# Patient Record
Sex: FEMALE | Race: WHITE | NOT HISPANIC OR LATINO | Employment: UNEMPLOYED | URBAN - METROPOLITAN AREA
[De-identification: names, ages, dates, MRNs, and addresses within clinical notes are randomized per-mention and may not be internally consistent; named-entity substitution may affect disease eponyms.]

---

## 2019-11-13 ENCOUNTER — HISTORICAL (OUTPATIENT)
Dept: ADMINISTRATIVE | Facility: HOSPITAL | Age: 59
End: 2019-11-13

## 2020-06-04 ENCOUNTER — HISTORICAL (OUTPATIENT)
Dept: ADMINISTRATIVE | Facility: HOSPITAL | Age: 60
End: 2020-06-04

## 2021-01-19 ENCOUNTER — HISTORICAL (OUTPATIENT)
Dept: ADMINISTRATIVE | Facility: HOSPITAL | Age: 61
End: 2021-01-19

## 2021-03-22 ENCOUNTER — HISTORICAL (OUTPATIENT)
Dept: ADMINISTRATIVE | Facility: HOSPITAL | Age: 61
End: 2021-03-22

## 2021-03-24 LAB — FINAL CULTURE: NORMAL

## 2021-04-12 ENCOUNTER — HISTORICAL (OUTPATIENT)
Dept: RADIOLOGY | Facility: HOSPITAL | Age: 61
End: 2021-04-12

## 2021-04-13 ENCOUNTER — HISTORICAL (OUTPATIENT)
Dept: RADIOLOGY | Facility: HOSPITAL | Age: 61
End: 2021-04-13

## 2021-06-02 ENCOUNTER — HISTORICAL (OUTPATIENT)
Dept: RADIOLOGY | Facility: HOSPITAL | Age: 61
End: 2021-06-02

## 2021-08-24 ENCOUNTER — HISTORICAL (OUTPATIENT)
Dept: ADMINISTRATIVE | Facility: HOSPITAL | Age: 61
End: 2021-08-24

## 2021-08-24 LAB
CHOLEST SERPL-MCNC: 240 MG/DL
CHOLEST/HDLC SERPL: 5 {RATIO} (ref 0–5)
HDLC SERPL-MCNC: 47 MG/DL (ref 35–60)
LDLC SERPL CALC-MCNC: 159 MG/DL (ref 50–140)
T4 FREE SERPL-MCNC: 0.89 NG/DL (ref 0.7–1.48)
TRIGL SERPL-MCNC: 168 MG/DL (ref 37–140)
TSH SERPL-ACNC: 0.13 UIU/ML (ref 0.35–4.94)
VLDLC SERPL CALC-MCNC: 34 MG/DL

## 2021-09-17 ENCOUNTER — HISTORICAL (OUTPATIENT)
Dept: ADMINISTRATIVE | Facility: HOSPITAL | Age: 61
End: 2021-09-17

## 2021-11-01 ENCOUNTER — HISTORICAL (OUTPATIENT)
Dept: ADMINISTRATIVE | Facility: HOSPITAL | Age: 61
End: 2021-11-01

## 2021-11-01 LAB — SARS-COV-2 AG RESP QL IA.RAPID: NEGATIVE

## 2021-11-03 LAB — CRC RECOMMENDATION EXT: NORMAL

## 2022-02-15 ENCOUNTER — HISTORICAL (OUTPATIENT)
Dept: RADIOLOGY | Facility: HOSPITAL | Age: 62
End: 2022-02-15

## 2022-02-15 ENCOUNTER — HISTORICAL (OUTPATIENT)
Dept: ADMINISTRATIVE | Facility: HOSPITAL | Age: 62
End: 2022-02-15

## 2022-02-18 ENCOUNTER — HISTORICAL (OUTPATIENT)
Dept: ADMINISTRATIVE | Facility: HOSPITAL | Age: 62
End: 2022-02-18

## 2022-02-18 ENCOUNTER — HISTORICAL (OUTPATIENT)
Dept: RADIOLOGY | Facility: HOSPITAL | Age: 62
End: 2022-02-18

## 2022-03-03 ENCOUNTER — HISTORICAL (OUTPATIENT)
Dept: RADIOLOGY | Facility: HOSPITAL | Age: 62
End: 2022-03-03

## 2022-03-04 ENCOUNTER — HISTORICAL (OUTPATIENT)
Dept: ADMINISTRATIVE | Facility: HOSPITAL | Age: 62
End: 2022-03-04

## 2022-03-04 ENCOUNTER — HISTORICAL (OUTPATIENT)
Dept: RADIOLOGY | Facility: HOSPITAL | Age: 62
End: 2022-03-04

## 2022-03-14 ENCOUNTER — HISTORICAL (OUTPATIENT)
Dept: RADIOLOGY | Facility: HOSPITAL | Age: 62
End: 2022-03-14

## 2022-03-14 ENCOUNTER — HISTORICAL (OUTPATIENT)
Dept: ADMINISTRATIVE | Facility: HOSPITAL | Age: 62
End: 2022-03-14

## 2022-03-17 ENCOUNTER — HISTORICAL (OUTPATIENT)
Dept: ADMINISTRATIVE | Facility: HOSPITAL | Age: 62
End: 2022-03-17

## 2022-03-17 ENCOUNTER — HISTORICAL (OUTPATIENT)
Dept: RADIOLOGY | Facility: HOSPITAL | Age: 62
End: 2022-03-17

## 2022-04-07 ENCOUNTER — HISTORICAL (OUTPATIENT)
Dept: ADMINISTRATIVE | Facility: HOSPITAL | Age: 62
End: 2022-04-07

## 2022-04-23 VITALS
WEIGHT: 125.69 LBS | OXYGEN SATURATION: 97 % | DIASTOLIC BLOOD PRESSURE: 88 MMHG | BODY MASS INDEX: 20.94 KG/M2 | SYSTOLIC BLOOD PRESSURE: 150 MMHG | HEIGHT: 65 IN

## 2022-05-01 NOTE — HISTORICAL [facility]
This is a historical note converted from Rosa. Formatting and pictures may have been removed.  Please reference [employer] for original formatting and attached multimedia. Chief Complaint  pt here for 1 yr sx f/u; no complaints, doing well.  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 She has no concerns at this time. She continues to smoke, 1/2 PPD. She has not been sexually active since surgery, and does not desire to be so at this time. She has had no changes in diet, activity, bowel or bladder function?recently.  ?   She denies bleeding, pain, bladder/bowel dysfunction, she denies hot flashes and self-discontinued paroxetine prescribed last year as her symptoms self resolved.  Review of Systems  Positive in bold.  CONSTITUTIONAL: Unintended weight loss, subjective?fever, chills, weakness, fatigue.  SKIN: Rash, itching.  CARDIOVASCULAR: Chest pain  RESPIRATORY: Shortness of breath  GASTROINTESTINAL: Nausea, vomiting, constipation, diarrhea, abdominal pain  GENITOURINARY: Hematuria, dysuria.  NEUROLOGICAL: Headache, dizziness, numbness, change in bowel or bladder control.  MUSCULOSKELETAL: Muscle, back pain, joint pain, stiffness.  Physical Exam  Vitals & Measurements  HR:?115(Peripheral)? RR:?16? BP:?138/81?  HT:?167.00?cm? WT:?52.900?kg? BMI:?18.97?  General: NAD, A/Ox3  Neck: supple, no masses, no thyromegaly  Respiratory: CTAB  Cardiovascular: RRR no murmurs, rubs, or gallops  Abdomen: soft, nondistended,?nontender to palpation, no appreciable hepatosplenomegaly, no masses palpated; no hernias  Incisions:?Well healed midline vertical incision  Extremities: no edema, no calf tenderness  Breast: Symmetric, no lesions or palpable?masses.? No skin puckering, no nipple retraction or discharge.? No?supraclavicular or axillary masses.?  ?   External genitalia: Normal female  anatomy.?No masses/lesions. Normal appearing urethral meatus. Normal appearing external anus, no infragluteal lesions. No lymphadenopathy. Small ingrown hair noted on the left inferior aspect of the labia, which was not noted to be indurated/swollen/infected/nontender to palpation. Narrowed introitus  Speculum?exam: vaginal mucosa normal in appearance. Pink. No masses/lesions.? Cervix absent.? No blood or abnormal discharge.  Bimanual exam: Uterus absent.?Narrow vagina.?No adnexal fullness/tenderness. Urethra and Bladder?nontender.  Assessment/Plan  1.?Well woman exam?Z01.419  -- Colonscopy referral placed to GI  -- Mammogram and DEXA scan order placed  -- Labs: CBC, CMP, HbA1c, TSH, UA  -- Discussed importance of daily vitamin with vitamin D and calcium for bone health given her age, smoking status and BSO status.  Ordered:  MG Dexa Bone Density Study, Routine, 03/22/21 8:07:00 CDT, Menopausal and Post Menopausal Disorder, None, Ambulatory, Rad Type, Order for future visit, Well woman exam, Schedule this test, [facility], 03/22/21 8:07:00 CDT  MG Screening Bilateral, Routine, 03/22/21 7:47:00 CDT, Screening, None, Ambulatory, Rad Type, Order for future visit, Well woman exam, Schedule this test, [facility], Follow Breast Imaging Order Set Protocol, 03/22/21 7:47:00 CDT  Urine Culture [id number], Routine collect, 03/22/21 8:10:00 CDT, Urine, Collected, Nurse collect, [id number], Stop date 03/22/21 8:10:00 CDT, Well woman exam  XR Bone Density Complete, Routine, 03/22/21 11:46:00 CDT, Screening, None, Ambulatory, Rad Type, Order for future visit, Well woman exam, Schedule this test, [facility], 03/22/21 11:46:00 CDT  ?  2.?Postmenopausal state?Z78.0  ?-- Patient denies vaginal dryness/pruritis/dyspareunia or vasomotor symptoms at this time. She states that she is not sexually active and does not desire to be so anymore.  ?  3.?Tobacco user?Z72.0  --  Discussed with her that her smoking can contribute to her decreased bone mass, posing an additional risk factor to her age and BSO status. Patient does not desire quitting at this time.  ?  1 year follow-up with NP  Referrals  Ambulatory Referral, Specialty: Gastroenterology, Reason: Colonoscopy, Start: 21 7:48:00 CDT, Well woman exam  ?  ?  Reviewed the patients medical history, residents findings on physical exam, and the diagnosis with treatment plan. Care provided was reasonable and necessary.  Oma Larry MD   Problem List/Past Medical History  Ongoing  Allergic rhinitis  Asthma  Hypertension  Knowledge deficit  Lumbago  Sciatica  TMJ disease  Tobacco user  Historical  Pregnant  Procedure/Surgical History  Appendectomy (2020)  Cystoscopy (None) (2020)  Excision of Bilateral Fallopian Tubes, Open Approach (2020)  Hysterectomy Total Abdominal (2020)  Inspection of Bladder, Via Natural or Artificial Opening Endoscopic (2020)  Resection of Bilateral Ovaries, Open Approach (2020)  Resection of Uterus, Open Approach (2020)  Facial Cyst (2003)  Rhinoplasty (2003)  Tonsils (2003)   section (10/21/1988)   Medications  atorvastatin 20 mg oral tablet, 20 mg= 1 tab(s), Oral, Daily  cyclobenzaprine 10 mg oral tablet, 10 mg= 1 tab(s), Oral, TID, 3 refills  hydrOXYzine hydrochloride 25 mg oral tablet, 25 mg= 1 tab(s), Oral, At Bedtime, 2 refills  omeprazole 20 mg oral DR capsule, See Instructions  Allergies  Augmentin?(Swelling)  Bactrim?(Shaking, Rapid heart beat, Constipation, Loss of appetite, Dizziness)  contrast media (iodine-based)?(Burning sensation)  Social History  Abuse/Neglect  No, No, Yes, 03/10/2021  Alcohol  Never, 2020  Employment/School  Unemployed, 2020  Exercise  Exercise duration: 0., 2020  Home/Environment  Lives with Spouse., 2020  Nutrition/Health  Regular, 2020  Sexual  Sexually active: No. Sexual  orientation: Straight or heterosexual. Gender Identity Identifies as female. No, 02/14/2020  Substance Use  Never, 02/14/2020  Tobacco  10 or more cigarettes (1/2 pack or more)/day in last 30 days, No, 03/22/2021  Family History  Alcoholism.: Brother and Brother.  Asthma.: Father.  Bipolar: Brother.  Heart failure.: Brother.  Parkinson disease: Father.  Health Maintenance  Health Maintenance  ???Pending?(in the next year)  ??? ??OverDue  ??? ? ? ?Smoking Cessation due??01/01/21??and every 1??year(s)  ??? ? ? ?Asthma Management-Spirometry due??01/28/21??and every 1??year(s)  ??? ? ? ?Hypertension Maintenance-Medication Prescribed due??02/01/21??and every 1??year(s)  ??? ??Due?  ??? ? ? ?Aspirin Therapy for CVD Prevention due??03/22/21??and every 1??year(s)  ??? ? ? ?Asthma Management-Asthma Education due??03/22/21??and every 6??month(s)  ??? ? ? ?Asthma Management-Sharma Peak Flow due??03/22/21??Variable frequency  ??? ? ? ?Asthma Management-Written Action Plan due??03/22/21??and every 6??month(s)  ??? ? ? ?Breast Cancer Screening due??03/22/21??Unknown Frequency  ??? ? ? ?Hypertension Management-Education due??03/22/21??and every 1??year(s)  ??? ? ? ?Lung Cancer Screening due??03/22/21??and every 1??year(s)  ??? ? ? ?Tetanus Vaccine due??03/22/21??and every 10??year(s)  ??? ? ? ?Zoster Vaccine due??03/22/21??Unknown Frequency  ??? ??Due In Future?  ??? ? ? ?Obesity Screening not due until??01/01/22??and every 1??year(s)  ??? ? ? ?Alcohol Misuse Screening not due until??01/02/22??and every 1??year(s)  ??? ? ? ?Colorectal Screening not due until??01/19/22??and every 1??year(s)  ???Satisfied?(in the past 1 year)  ??? ??Satisfied?  ??? ? ? ?ADL Screening on??03/22/21.??Satisfied by Melissa Salomon  ??? ? ? ?Alcohol Misuse Screening on??03/10/21.??Satisfied by Adry Ferrer  ??? ? ? ?Blood Pressure Screening on??03/22/21.??Satisfied by Melissa Salomon  ??? ? ? ?Body Mass Index Check on??03/22/21.??Satisfied  by Melissa Salomon  ??? ? ? ?Colorectal Screening on??21.??Satisfied by Mercedes Tiwari  ??? ? ? ?Depression Screening on??21.??Satisfied by Melissa Salomon  ??? ? ? ?Diabetes Screening on??21.??Satisfied by Osei Swain  ??? ? ? ?Hypertension Management-BMP on??21.??Satisfied by Osei Swain  ??? ? ? ?Influenza Vaccine on??21.??Satisfied by Melissa Salomon  ??? ? ? ?Lipid Screening on??21.??Satisfied by Osei Swain  ??? ? ? ?Obesity Screening on??21.??Satisfied by Melissa Salomon  ??? ??Refused?  ??? ? ? ?Influenza Vaccine on??03/10/21.??Recorded by Adry Ferrer??Reason: Patient Refuses  ?  Lab Results  Labs Last 24 Hours?  ?Chemistry? Hematology/Coagulation?   Sodium Lvl: 142 mmol/L (21 08:51:00) WBC:?12.3 x10(3)/mcL?High (21 08:51:00)   Potassium Lvl: 4.4 mmol/L (21 08:51:00) RBC: 4.57 x10(6)/mcL (21 08:51:00)   Chloride: 103 mmol/L (21 08:51:00) Hgb: 13.5 gm/dL (21 08:51:00)   CO2: 30 mmol/L (21 08:51:00) Hct: 42.4 % (21 08:51:00)   Calcium Lvl: 9.6 mg/dL (21 08:51:00) Platelet: 336 x10(3)/mcL (21 08:51:00)   Glucose Lvl: 94 mg/dL (21 08:51:00) MCV: 92.8 fL (21 08:51:00)   EA.5 mg/dL (21 08:51:00) MCH: 29.5 pg (21 08:51:00)   BUN: 14.6 mg/dL (21 08:51:00) MCHC: 31.8 gm/dL (21 08:51:00)   Creatinine: 0.73 mg/dL (21 08:51:00) RDW: 13.6 % (21 08:51:00)   Est Creat Clearance Ser: 74.07 mL/min (21 10:19:28) MPV: 9.7 fL (21 08:51:00)   eGFR-AA: 104 (21 08:51:00) Abs Neut: 8.63 x10(3)/mcL (21 08:51:00)   eGFR-ESE:?86 mL/min/1.73 m2?Low (21 08:51:00) Neutro Auto: 70 % (21 08:51:00)   Bili Total: 0.5 mg/dL (21 08:51:00) Lymph Auto: 22 % (21 08:51:00)   Bili Direct: 0.2 mg/dL (21 08:51:00) Mono Auto: 6 % (21 08:51:00)   Bili Indirect: 0.3 mg/dL (21 08:51:00) Eos Auto: 2 %  (03/22/21 08:51:00)   AST: 15 unit/L (03/22/21 08:51:00) Abs Eos: 0.2 x10(3)/mcL (03/22/21 08:51:00)   ALT: 12 unit/L (03/22/21 08:51:00) Basophil Auto: 0 % (03/22/21 08:51:00)   Alk Phos: 82 unit/L (03/22/21 08:51:00) Abs Neutro: 8.63 x10(3)/mcL (03/22/21 08:51:00)   Total Protein:?7.7 gm/dL?High (03/22/21 08:51:00) Abs Lymph: 2.7 x10(3)/mcL (03/22/21 08:51:00)   Albumin Lvl: 4.1 gm/dL (03/22/21 08:51:00) Abs Mono: 0.7 x10(3)/mcL (03/22/21 08:51:00)   Globulin:?3.6 gm/dL?High (03/22/21 08:51:00) Abs Baso: 0.1 x10(3)/mcL (03/22/21 08:51:00)   A/G Ratio: 1.1 ratio (03/22/21 08:51:00) IG%: 0 % (03/22/21 08:51:00)   Hgb A1c: 5.2 % (03/22/21 08:51:00) IG#: 0.05 (03/22/21 08:51:00)   TSH:?0.3337 uIU/mL?Low (03/22/21 08:51:00)

## 2022-06-15 ENCOUNTER — HOSPITAL ENCOUNTER (OUTPATIENT)
Dept: PULMONOLOGY | Facility: HOSPITAL | Age: 62
Discharge: HOME | End: 2022-06-15
Attending: INTERNAL MEDICINE
Payer: MEDICAID

## 2022-06-15 ENCOUNTER — HOSPITAL ENCOUNTER (OUTPATIENT)
Dept: RADIOLOGY | Facility: HOSPITAL | Age: 62
Discharge: HOME | End: 2022-06-15
Attending: INTERNAL MEDICINE
Payer: MEDICAID

## 2022-06-15 DIAGNOSIS — R91.1 LUNG NODULE: ICD-10-CM

## 2022-06-15 PROCEDURE — 71250 CT THORAX DX C-: CPT | Mod: TC

## 2022-06-15 NOTE — PROGRESS NOTES
This patient was not able to perform the Pulmonary Functions Test due to excessive coughing during the exercises. Testing discontinued without results.

## 2022-06-28 ENCOUNTER — OFFICE VISIT (OUTPATIENT)
Dept: FAMILY MEDICINE | Facility: CLINIC | Age: 62
End: 2022-06-28
Payer: MEDICAID

## 2022-06-28 VITALS
HEIGHT: 65 IN | SYSTOLIC BLOOD PRESSURE: 173 MMHG | RESPIRATION RATE: 18 BRPM | OXYGEN SATURATION: 96 % | WEIGHT: 121.06 LBS | BODY MASS INDEX: 20.17 KG/M2 | HEART RATE: 97 BPM | DIASTOLIC BLOOD PRESSURE: 77 MMHG | TEMPERATURE: 98 F

## 2022-06-28 DIAGNOSIS — R03.0 ELEVATED BLOOD PRESSURE READING: ICD-10-CM

## 2022-06-28 DIAGNOSIS — Z71.6 ENCOUNTER FOR SMOKING CESSATION COUNSELING: ICD-10-CM

## 2022-06-28 DIAGNOSIS — F32.A DEPRESSION, UNSPECIFIED DEPRESSION TYPE: ICD-10-CM

## 2022-06-28 DIAGNOSIS — G89.29 OTHER CHRONIC PAIN: ICD-10-CM

## 2022-06-28 DIAGNOSIS — K62.89 RECTAL MASS: ICD-10-CM

## 2022-06-28 DIAGNOSIS — K44.9 HIATAL HERNIA: ICD-10-CM

## 2022-06-28 DIAGNOSIS — R74.01 TRANSAMINITIS: ICD-10-CM

## 2022-06-28 DIAGNOSIS — M51.36 LUMBAR DEGENERATIVE DISC DISEASE: ICD-10-CM

## 2022-06-28 DIAGNOSIS — M47.9 SPONDYLOSIS: ICD-10-CM

## 2022-06-28 DIAGNOSIS — K21.9 GASTROESOPHAGEAL REFLUX DISEASE, UNSPECIFIED WHETHER ESOPHAGITIS PRESENT: ICD-10-CM

## 2022-06-28 DIAGNOSIS — M54.9 BACK PAIN, UNSPECIFIED BACK LOCATION, UNSPECIFIED BACK PAIN LATERALITY, UNSPECIFIED CHRONICITY: Primary | ICD-10-CM

## 2022-06-28 PROBLEM — M51.369 LUMBAR DEGENERATIVE DISC DISEASE: Status: ACTIVE | Noted: 2022-06-28

## 2022-06-28 LAB
AMPHET UR QL SCN: NEGATIVE
BARBITURATE SCN PRESENT UR: NEGATIVE
BENZODIAZ UR QL SCN: NEGATIVE
CANNABINOIDS UR QL SCN: NEGATIVE
COCAINE UR QL SCN: NEGATIVE
FENTANYL UR QL SCN: NEGATIVE
MDMA UR QL SCN: NEGATIVE
OPIATES UR QL SCN: NEGATIVE
PCP UR QL: NEGATIVE
PH UR: 7 [PH] (ref 3–11)
SPECIFIC GRAVITY, URINE AUTO (.000) (OHS): 1.01 (ref 1–1.03)

## 2022-06-28 PROCEDURE — 99213 OFFICE O/P EST LOW 20 MIN: CPT | Mod: PBBFAC

## 2022-06-28 PROCEDURE — 80307 DRUG TEST PRSMV CHEM ANLYZR: CPT

## 2022-06-28 RX ORDER — OMEPRAZOLE 20 MG/1
20 CAPSULE, DELAYED RELEASE ORAL DAILY
Qty: 30 CAPSULE | Refills: 11 | Status: SHIPPED | OUTPATIENT
Start: 2022-06-28 | End: 2023-03-07 | Stop reason: SDUPTHER

## 2022-06-28 RX ORDER — CYCLOBENZAPRINE HCL 10 MG
10 TABLET ORAL 3 TIMES DAILY PRN
Qty: 90 TABLET | Refills: 2 | Status: SHIPPED | OUTPATIENT
Start: 2022-06-28 | End: 2022-07-29 | Stop reason: SDUPTHER

## 2022-06-28 RX ORDER — OXYCODONE AND ACETAMINOPHEN 7.5; 325 MG/1; MG/1
1 TABLET ORAL
Qty: 30 TABLET | Refills: 0 | Status: ON HOLD | OUTPATIENT
Start: 2022-06-28 | End: 2022-09-16 | Stop reason: HOSPADM

## 2022-06-28 NOTE — PROGRESS NOTES
Family Medicine Clinic Note:    PCP: Primary Doctor Koki HARTLEY Haylie is a 62 y.o. female presents to clinic today for  Hiatal Hernia pain       Elevated BP  Pt has no history of HTN, states has been in pain  Initial bp 173/77, Repeat 142/73 manual     Rectal Mass  Saw surgery and refusing surgical intervention for rectal mass at this time  +sweating reported that she attributes to the hiatal hernia  LUQ Abdominal pain that she also attributes to the hernia  +abd swelling  +using ice pack  Not taking the pain medication (oxycodone 7.5 mg/ acetaminophen 325mg daily and needs refills  No bloody stools, No blood in the urine  Will consider surgery if hiatal hernia is repaired      Transaminitis  Resolved on 3/22/22    Back pain 2/ lumbar degenerative disc disease L5-S1 and spondylosis MRI 22)  Requesting flexeril refills  Not taking the Lyrica nor the hydoxyzine   Requesting pain medication       GERD  Requesting the refills Omeprzole 20 mg calli    Depression  Mother  2 months ago   +decreased appetite, +crying episodes daily, difficulty concentrating and forgetfulness ex. loosing keys, +STM loss,   Denies feelings of guilt, SI/HI, loss of interest, psychomotor activities  Has been using the chapelin and friends to cope  Not interested in taking any medications or counselor at this time      Smoking cessation  Started the patch but stopped  States she will restart      Hiatal Hernia  +painful rectal exam and pt walked out of visit with surgeon  Wants to have the hiatal hernia removal   Pt to call Dr. Rolle in surgery clinic     HM:  Pulmonary Nodule  -RUL nodule no change with CT scan 21 from 2020    Subjective:       ROS  Constitutional: No fevers, chills or fatigue  Respiratory: no trouble breathing or SOB  Cardiovascular: no chest pain or tightness, no SOB on activity, no ankle swelling  Gastrointestinal: see HPI    Current Outpatient Medications   Medication Sig Dispense Refill     "cyclobenzaprine (FLEXERIL) 10 MG tablet Take 1 tablet (10 mg total) by mouth 3 (three) times daily as needed for Muscle spasms. 90 tablet 2    omeprazole (PRILOSEC) 20 MG capsule Take 1 capsule (20 mg total) by mouth once daily. 30 capsule 11    oxyCODONE-acetaminophen (PERCOCET) 7.5-325 mg per tablet Take 1 tablet by mouth every 24 hours as needed for Pain. 30 tablet 0     No current facility-administered medications for this visit.       Objective:     BP (!) 173/77 (BP Location: Right arm, Patient Position: Sitting, BP Method: Medium (Automatic))   Pulse 97   Temp 98.1 °F (36.7 °C) (Oral)   Resp 18   Ht 5' 5" (1.651 m)   Wt 54.9 kg (121 lb 0.5 oz)   SpO2 96%   BMI 20.14 kg/m²   BP Readings from Last 3 Encounters:   06/28/22 (!) 173/77   12/27/21 (!) 150/88     Wt Readings from Last 3 Encounters:   06/28/22 54.9 kg (121 lb 0.5 oz)   12/27/21 57 kg (125 lb 10.6 oz)     Recent Results (from the past 200 hour(s))   Drug Screen, Urine    Collection Time: 06/28/22  4:03 PM   Result Value Ref Range    Amphetamines, Urine Negative Negative    Barbituates, Urine Negative Negative    Benzodiazepine, Urine Negative Negative    Cannabinoids, Urine Negative Negative    Cocaine, Urine Negative Negative    Fentanyl, Urine Negative Negative    MDMA, Urine Negative Negative    Opiates, Urine Negative Negative    Phencyclidine, Urine Negative Negative    pH, Urine 7.0 3.0 - 11.0    Specific Gravity, Urine Auto 1.015 1.001 - 1.035     Body mass index is 20.14 kg/m².    Physical Exam  General: cooperative, no acute respiratory distress  CV: RRR, no murmurs  Lungs: CTA b/l, no wheezing?  Abdomen: soft, NT, ND, + BS x 4, no organomegaly, no CVA tenderness  Extremities: no cyanosis, clubbing, or edema  PSYCH: alert and oriented x 3 with sad mood and affect  NEURO: sensation intact by light touch; DTRs +2 bilateral and symmetrical        The 10-year ASCVD risk score (Toddomid IBANEZ Jr., et al., 2013) is: 19.2%    Values used to " calculate the score:      Age: 62 years      Sex: Female      Is Non- : No      Diabetic: No      Tobacco smoker: Yes      Systolic Blood Pressure: 173 mmHg      Is BP treated: No      HDL Cholesterol: 47 mg/dL      Total Cholesterol: 240 mg/dL      Assessment:   Lore Stallings is a 62 y.o. female with:    1. Back pain, unspecified back location, unspecified back pain laterality, unspecified chronicity    2. Gastroesophageal reflux disease, unspecified whether esophagitis present    3. Other chronic pain    4. Rectal mass    5. Transaminitis    6. Spondylosis    7. Lumbar degenerative disc disease    8. Depression, unspecified depression type    9. Encounter for smoking cessation counseling    10. Hiatal hernia    11. Elevated blood pressure reading        Plan:     Elevated BP  Pt has no history of HTN, states has been in pain  Initial bp 173/77, Repeat 142/73 manual   Suspect due to pain, will treat pain and continue to monitor    Rectal Mass  Instructed on surgery to remove the rectal mass   +sweating and discussed likely related to mass and not the Hiatal hernia  Discussed risks of mets   Instructed to set up appointment with surgery for hiatal hernia repair  Pt to consider surgical intervention of rectal mass after repair of the hernia     Transaminitis  Resolved on 3/22/22    Back pain 2/2 lumbar degenerative disc disease L5-S1 and spondylosis MRI 2/2/22)  Refilled flexeril   Not taking the Lyrica nor the hydoxyzine   Pain contract signed today   Ordered oxycodone 7.5 mg/ acetaminophen 325 mg daily prn pain      GERD  Ordered Omeprzole 20 mg calli    Depression  Denies feelings of guilt, SI/HI,  Has been using the chapelin and friends to cope  Not interested in taking any medications or counselor at this time  Instructed to notify this provider if symptoms worsen or if she changes her mind    Smoking cessation  Encouraged restarting patches      Hiatal Hernia  Pt to call Dr. Rolle in  surgery clinic to set up appointment to discuss possible hernia repair          Previous labs, imaging, and notes from other specialities as specified above were all reviewed at this visit with the patient.     Follow up in about 4 weeks (around 7/26/2022) for hiatal hernia pain and pain contract.    Future Appointments   Date Time Provider Department Center   7/29/2022  8:40 AM RESIDENT 13, [facility] FAMILY MEDICINE [facility] FM RES [place]    10/10/2022  2:20 PM PERFECTO Gonzalez MD [facility] GBR [facility] Naqvi   3/27/2023  1:10 PM Tatiana Herron, ABDI [facility] GYN [facility]       Staff: Dr. Luis Swartz MD  Family Medicine PGY-2

## 2022-06-30 ENCOUNTER — TELEPHONE (OUTPATIENT)
Dept: FAMILY MEDICINE | Facility: CLINIC | Age: 62
End: 2022-06-30
Payer: MEDICAID

## 2022-07-01 NOTE — TELEPHONE ENCOUNTER
Please schedule a nurse visit next week to recheck her BP.  Please call and inform pt.  Thank you.

## 2022-07-06 ENCOUNTER — OFFICE VISIT (OUTPATIENT)
Dept: FAMILY MEDICINE | Facility: CLINIC | Age: 62
End: 2022-07-06
Payer: MEDICAID

## 2022-07-06 VITALS
BODY MASS INDEX: 20.18 KG/M2 | OXYGEN SATURATION: 99 % | RESPIRATION RATE: 18 BRPM | WEIGHT: 121.25 LBS | HEART RATE: 78 BPM | SYSTOLIC BLOOD PRESSURE: 144 MMHG | DIASTOLIC BLOOD PRESSURE: 78 MMHG

## 2022-07-06 DIAGNOSIS — R03.0 ELEVATED BLOOD PRESSURE READING: Primary | ICD-10-CM

## 2022-07-06 PROCEDURE — 99213 OFFICE O/P EST LOW 20 MIN: CPT | Mod: PBBFAC

## 2022-07-06 NOTE — PROGRESS NOTES
Subjective:       Patient ID: Lore Stallings is a 62 y.o. female.    Chief Complaint: Follow-up (Bp check)    HPI   Concerns for elevated BP.     Last saw PCP on 6/28/22, note reviewed. At that visit, she was noted to have elevated BP. Initial bp 173/77, Repeat 142/73 manual. There is no prior hx of HTN. Her elevated BP was felt to be secondary to pain from her hiatal hernia. She was instructed to f/u with surgery for  Hernia repair, was given pain medication, and discharged in stable condition.     Today she reports feeling okay. Denies any headache, vision changes, weakness, facial asymmetry.     Current medications: Flexeril, Prilosec, Percocet PRN  Chart review shows no prior renal or cardiac disease. No prior renal labwork done. No hx of stroke or TIA.     Review of Systems   All other systems reviewed and are negative.        Objective:       Vitals:    07/06/22 1233   BP: (!) 144/78.    Repeat /75   Pulse: 78   Resp: 18       Physical Exam  Constitutional:       Appearance: Normal appearance.   Cardiovascular:      Rate and Rhythm: Normal rate and regular rhythm.      Pulses: Normal pulses.      Heart sounds: Normal heart sounds.   Pulmonary:      Effort: Pulmonary effort is normal.      Breath sounds: Normal breath sounds.   Neurological:      General: No focal deficit present.      Mental Status: She is alert and oriented to person, place, and time.   Psychiatric:         Mood and Affect: Mood normal.         Behavior: Behavior normal.           ASCVD Risk: 10.2% today.     Assessment:       Problem List Items Addressed This Visit        Cardiac/Vascular    Elevated blood pressure reading - Primary          Plan:       Elevated BP reading in office  No  Prior history of HTN. Patient is a current tobacco smoker, current ASCVD risk of 10.2%. Asymptomatic today, with no signs of TIA or stroke. AAFP and JNC guidelines for patient is to have a goal SBP of <150 and DBP <90. At this time, the patient is  within those guidelines.   - Discussed TIA and stroke warning signs.   - Discussed importance of BP control  - F/u with PCP. Will NOT start any medications at this time.

## 2022-07-07 NOTE — PROGRESS NOTES
Faculty Attestation: Lore Stallings  was seen in Family Medicine Clinic. Discussed with Dr. Baker at the time of the visit. History of Present Illness, Physical Exam, and Assessment and Plan reviewed. Treatment plan is reasonable and appropriate. Compliance with treatment recommendations is important.       Anabell Mtz MD  Family/Sports Medicine

## 2022-07-12 ENCOUNTER — OFFICE VISIT (OUTPATIENT)
Dept: SURGERY | Facility: CLINIC | Age: 62
End: 2022-07-12
Payer: MEDICAID

## 2022-07-12 VITALS
SYSTOLIC BLOOD PRESSURE: 140 MMHG | RESPIRATION RATE: 18 BRPM | TEMPERATURE: 98 F | HEART RATE: 98 BPM | DIASTOLIC BLOOD PRESSURE: 72 MMHG | WEIGHT: 121.63 LBS | BODY MASS INDEX: 20.26 KG/M2 | HEIGHT: 65 IN | OXYGEN SATURATION: 96 %

## 2022-07-12 DIAGNOSIS — K44.9 HIATAL HERNIA: Primary | ICD-10-CM

## 2022-07-12 PROCEDURE — 99214 OFFICE O/P EST MOD 30 MIN: CPT | Mod: PBBFAC

## 2022-07-12 RX ORDER — ALBUTEROL SULFATE 90 UG/1
2 AEROSOL, METERED RESPIRATORY (INHALATION) EVERY 4 HOURS PRN
COMMUNITY
Start: 2022-06-22 | End: 2022-09-06 | Stop reason: SDUPTHER

## 2022-07-12 RX ORDER — ALENDRONATE SODIUM 10 MG/1
10 TABLET ORAL DAILY
COMMUNITY
Start: 2022-02-24 | End: 2023-03-07 | Stop reason: SDUPTHER

## 2022-07-12 NOTE — PROGRESS NOTES
"Chief complaint:  had concerns including  3 month follow up for rectal mass and hiatal hernia.     HPI:   Lore Stallings is a 62 y.o. female with PMHx significant for asthma, laryngitis, sciatica. Reports 25 year h/o upper abdominal pain associated with eating, breathing, and pain medication. Of note, pt had a colonoscopy which revealed a rectal mass with high grade dysplasia; MRI demonstrated rectal wall thickening. Was seen by her PCP who worked-up hiatal hernia. Referred to [facility] for hiatal hernia repair.    ROS:  Reports unable to walk up a flight of stairs without severe SOB      PMHx:  has a past medical history of Asthma, GERD (gastroesophageal reflux disease), Hypertension, Lumbago, and Sciatica.  PSHx:  has a past surgical history that includes Appendectomy;  section; Tonsillectomy; Hysterectomy; Colonoscopy; Rhinoplasty; Polypectomy; and Esophagogastrectomy.  FamHx: family history includes Arthritis in her brother; Asthma in her father; Heart disease in her brother.  SocHx:  reports that she has quit smoking. Her smoking use included cigarettes. She has never used smokeless tobacco. She reports previous alcohol use. She reports that she does not use drugs.   ALL: Contrast (burning), amoxicillin (edema)    Physical Exam:  Vitals: Blood pressure (!) 140/72, pulse 98, temperature 97.5 °F (36.4 °C), temperature source Oral, resp. rate 18, height 5' 5" (1.651 m), weight 55.2 kg (121 lb 9.6 oz), SpO2 96 %.  General: awake, alert, cooperative, in no acute distress. Pt oriented x3.  Mood/affect normal.    Lungs: CTAB. No retractions or nasal flaring  Abd: soft, non-distended, midline upper TTP  Extremities:  MAEW, good muscle tone and strength  Skin: no rash or edema noted  Neuro: AAO x 3, follows commands, normal gait    No results found for: WBC, RBC, HGB, HCT, MCV, MCH, MCHC, RDW, MPV, PLT  No results found for: NA, K, CL, CO2, GLU, BUN, LABCREA, CALCIUM, MG, PROT, ALBUMIN, BILITOT, AST, " ALT, GFRAA  Triglyceride   Date Value Ref Range Status   08/24/2021 168 (H) 37 - 140 mg/dL Final     Cholesterol Total   Date Value Ref Range Status   08/24/2021 240 (H) <<=200 mg/dL Final     HDL Cholesterol   Date Value Ref Range Status   08/24/2021 47 35 - 60 mg/dL Final     No components found for: UA  No results found for: HGBA1C     Imaging:     CT Chest wo (4/20/22): UPPER ABDOMEN: There is pneumobilia.  Small sliding hiatal hernia.    MRI (3/17/22): There is prominent mid to upper rectal wall thickening which is near  circumferential with perhaps some sparing around 12:00. The most  distal aspect of the wall thickening is approximately 7-8 cm from the  anal verge. Affected segment is approximately 5-6 cm in length. This  extends to the muscularis propria but does not clearly invade it. No  enlarged perirectal lymph nodes are seen. Colonic diverticulosis is  seen more superiorly.      Assessment and Plan:  1)  Hiatal Hernia: Dr. Naqvi to place referral and contact office ([phone]) for hiatal hernia repair.     2)   Rectal mass: Explained to pt severe concern for rectal cancer. Pt firmly not interested in any treatment measures to address the rectal mass at this time. She states she wants to address the hiatal hernia first.    3)  Smoking cessation: pt not taking any medications for smoking cessation. Reports no cravings at this time. Explained to pt if she has cravings, she can follow up for education on smoking cessation assistance.        Dario Diehl  MS3  And  Steve Naqvi MD PGY 5  U General Surgery   [phone]

## 2022-07-12 NOTE — PATIENT INSTRUCTIONS
Pt seen by Dr. Naqvi. Pt will be referred to [facility] for hiatal hernia repair. Referral will be sent in by provider along with phone call to office to set up referral. Pt education given both written and verbal

## 2022-07-12 NOTE — PROGRESS NOTES
I have reviewed the note and assessment with the Resident.  Pt is counseled about need for therapy for rectal mass and  possible malignancy. Pt is adamantly against any pursuit of  surgery for rectal problem. She is informed that it is not thought   that anyone will fix HH at this time w/o further tx for rectal  problem.

## 2022-07-14 DIAGNOSIS — K44.9 HIATAL HERNIA: Primary | ICD-10-CM

## 2022-07-28 NOTE — PROGRESS NOTES
Family Medicine Clinic Note:    PCP: Kyra Reed MD    Lroe Stallings is a 62 y.o. female presents to clinic today for cc: f/up of blood pressure and hiatal hernia      Subjective:     Elevated BP   -BP today 166/83  -Last bp check in the clinic 22 143/75 (prior visit 173/77 and manual 142/73--suspected due to pain)  -Denies any chest pain, sob, palpitations or dizziness       Hiatal Hernia   Rectal Mass  -Hiatal Hernia was referred to [facility] for hiatal hernia repair. Scheduling pending.  -Referred for surgery with Dr. Steve Naqvi/Dr. Jose Dominique   -Continues to decline surgical intervention for rectal mass at this time (Risks and benefits revisited again)  -oxycodone 7.5 mg/ acetaminophen 325 mg at night only --no refills requested  -Oxycodone causing nausea when taken. Stated the 4 mg did not help and has increased to 8 mg   -We have previously attempted to increase the omeprazole to 40 mg and pt did not tolerate well       Back pain 2/2 lumbar degenerative disc disease L5-S1 and spondylosis MRI 22)  Sciatica  Both legs  And buttocks with sharp shooting pain   On flexeril 10 mg and tolerating. No side effects reported  No saddle anesthesia, peripheral paresthesia, abnormal gait, night sweats, night pain or new symptoms      Depression  mother  in April.   States feeling better. Decreased crying episodes.  Hardest when she meets friends of her mother  Denies feelings of guilt, SI/HI, loss of interest, psychomotor activities   No longer using the chapelin but utilizes  friends to cope.  Declines medications at this time  No SI/HI    Smoking cessation  Had stopped the patch but has resumed  Not Interested in the smoking cessation program at this time      HM:  Lung Nodules: Low density CT chest 6/15/2022  and No detrimental interval change from prior exam.  Right upper lobe pulmonary nodule is unchanged.  On alendronate weekly  Will hold on the HM until  "after surgeical procedures    ROS      Constitutional: No fevers, chills or fatigue  Respiratory: no trouble breathing or SOB  Cardiovascular: no chest pain or tightness, no SOB on activity, no ankle swelling  Gastrointestinal: no constipation, no diarrhea, no vomiting      Current Outpatient Medications   Medication Sig Dispense Refill    alendronate (FOSAMAX) 10 MG Tab Take 10 mg by mouth once daily.      omeprazole (PRILOSEC) 20 MG capsule Take 1 capsule (20 mg total) by mouth once daily. 30 capsule 11    oxyCODONE-acetaminophen (PERCOCET) 7.5-325 mg per tablet Take 1 tablet by mouth every 24 hours as needed for Pain. 30 tablet 0    PROAIR HFA 90 mcg/actuation inhaler Inhale 2 puffs into the lungs every 4 (four) hours as needed.      cyclobenzaprine (FLEXERIL) 10 MG tablet Take 1 tablet (10 mg total) by mouth 3 (three) times daily as needed for Muscle spasms. 90 tablet 2    hydroCHLOROthiazide (HYDRODIURIL) 12.5 MG Tab Take 1 tablet (12.5 mg total) by mouth once daily. 30 tablet 2    ondansetron (ZOFRAN-ODT) 4 MG TbDL Take 2 tablets (8 mg total) by mouth nightly as needed (nausea). 30 tablet 2    pregabalin (LYRICA) 75 MG capsule Take 1 capsule (75 mg total) by mouth nightly as needed (sciatica). 30 capsule 2     No current facility-administered medications for this visit.       Objective:     BP (!) 166/83 (BP Location: Right arm, Patient Position: Sitting, BP Method: Small (Automatic))   Pulse 100   Temp 98.2 °F (36.8 °C) (Oral)   Resp 19   Ht 5' 5" (1.651 m)   Wt 55.1 kg (121 lb 6.4 oz)   SpO2 95%   BMI 20.20 kg/m²   BP Readings from Last 3 Encounters:   07/29/22 (!) 166/83   07/12/22 (!) 140/72   07/06/22 (!) 144/78     Wt Readings from Last 3 Encounters:   07/29/22 55.1 kg (121 lb 6.4 oz)   07/12/22 55.2 kg (121 lb 9.6 oz)   07/06/22 55 kg (121 lb 4.1 oz)     No results found for this or any previous visit (from the past 200 hour(s)).  Body mass index is 20.2 kg/m².         (3/17/2022) MRI of " rectal Mass:  There is prominent mid to upper rectal wall thickening which is near  circumferential with perhaps some sparing around 12:00. The most  distal aspect of the wall thickening is approximately 7-8 cm from the  anal verge. Affected segment is approximately 5-6 cm in length. This  extends to the muscularis propria but does not clearly invade it. No  enlarged perirectal lymph nodes are seen. Colonic diverticulosis is  seen more superiorly.    Esophagram 3/18/22:   IMPRESSION:   1. Small sliding hiatal hernia with small volume reflux.  2. Mild esophageal dysmotility.    US Abd 3/4/22:  IMPRESSION: No significant abnormality    CT Abd 2/18/22:  IMPRESSION:  Possible rectal mass for which direct visualization may be helpful      MRI Lumbar spine:     IMPRESSION: 2/15/2022   Lumbar degenerative disc disease and spondylosis level by level  discussed above.    Physical Exam  Cardiovascular:      Rate and Rhythm: Normal rate.      Pulses: Normal pulses.      Heart sounds: Normal heart sounds. No murmur heard.    No friction rub. No gallop.   Pulmonary:      Effort: No respiratory distress.      Breath sounds: No stridor. No wheezing, rhonchi or rales.   Chest:      Chest wall: No tenderness.   Abdominal:      General: Bowel sounds are normal.      Palpations: Abdomen is soft.      Tenderness: There is abdominal tenderness. There is no guarding.      Hernia: A hernia is present.   Skin:     General: Skin is warm and dry.      Capillary Refill: Capillary refill takes less than 2 seconds.   Neurological:      Mental Status: She is alert.   Psychiatric:         Mood and Affect: Mood normal.         Behavior: Behavior normal.         Thought Content: Thought content normal.         The 10-year ASCVD risk score ([place] SHAMAR Jr., et al., 2013) is: 13.2%    Values used to calculate the score:      Age: 62 years      Sex: Female      Is Non- : No      Diabetic: No      Tobacco smoker: No      Systolic  Blood Pressure: 166 mmHg      Is BP treated: Yes      HDL Cholesterol: 47 mg/dL      Total Cholesterol: 240 mg/dL      Assessment:   Lore Stallings is a 62 y.o. female with:    1. Nausea    2. Back pain, unspecified back location, unspecified back pain laterality, unspecified chronicity    3. Sciatica, unspecified laterality    4. Hyperthyroidism    5. Lumbar degenerative disc disease    6. Primary hypertension    7. Encounter for smoking cessation counseling        Plan:   -Ordered zofran 8mg qhs prn  -Continue oxycodone 7.5 mg/ acetaminophen 325 mg. Refilled flexeril.  -Ordered Lyrica 75 mg qhs. Side effect discussed. To discontinue if any side effects occurs  -Encouraged smoking cessation especially prior to the surgery due to decreased healing   -Ordered T3, T4, TSH -downward trend of TSH noted  -Started HCTZ 12.5 mg for new onset HTN  -Follow low sodium diet (2 grams a day)  -Control high blood pressure ( goal BP < 130/80, please record BP at home every day and bring log to next office visit)  -Exercise at least 30 minutes a day, 5 days a week as tolerable  -Decrease or stop alcohol use  -Nonsteroidal anti-inflammatory drugs (NSAIDs) may disrupt control of blood pressure in hypertensive patients and increase their risk of morbidity, mortality, and the costs of care.  -Pt willing to start the patches. Smoking cessation discussed and the importance of promoting healing with up coming hernia repair.  -Will hold off on HM until after surgical procedures  -Will discuss statin use next visit. ASCVD 13.2%           Follow up in about 2 months (around 9/29/2022).    Future Appointments   Date Time Provider Department Center   9/28/2022 10:40 AM RESIDENT 16, [facility] FAMILY MEDICINE [facility] FM RES [facility]    10/10/2022  2:20 PM PERFECTO Gonzalez MD OCC [facility] Naqvi   3/27/2023  1:10 PM ABDI Goddard [facility] GYN [facility]       Staff: Dr. Litzy Swartz MD  Family Medicine PGY-2

## 2022-07-29 ENCOUNTER — OFFICE VISIT (OUTPATIENT)
Dept: FAMILY MEDICINE | Facility: CLINIC | Age: 62
End: 2022-07-29
Payer: MEDICAID

## 2022-07-29 VITALS
HEART RATE: 100 BPM | TEMPERATURE: 98 F | HEIGHT: 65 IN | SYSTOLIC BLOOD PRESSURE: 166 MMHG | OXYGEN SATURATION: 95 % | RESPIRATION RATE: 19 BRPM | WEIGHT: 121.38 LBS | BODY MASS INDEX: 20.22 KG/M2 | DIASTOLIC BLOOD PRESSURE: 83 MMHG

## 2022-07-29 DIAGNOSIS — Z71.6 ENCOUNTER FOR SMOKING CESSATION COUNSELING: ICD-10-CM

## 2022-07-29 DIAGNOSIS — I10 PRIMARY HYPERTENSION: ICD-10-CM

## 2022-07-29 DIAGNOSIS — M51.36 LUMBAR DEGENERATIVE DISC DISEASE: ICD-10-CM

## 2022-07-29 DIAGNOSIS — M54.30 SCIATICA, UNSPECIFIED LATERALITY: ICD-10-CM

## 2022-07-29 DIAGNOSIS — E05.90 HYPERTHYROIDISM: ICD-10-CM

## 2022-07-29 DIAGNOSIS — M54.9 BACK PAIN, UNSPECIFIED BACK LOCATION, UNSPECIFIED BACK PAIN LATERALITY, UNSPECIFIED CHRONICITY: ICD-10-CM

## 2022-07-29 DIAGNOSIS — R11.0 NAUSEA: Primary | ICD-10-CM

## 2022-07-29 PROBLEM — R19.00 PELVIC MASS: Status: ACTIVE | Noted: 2019-12-16

## 2022-07-29 PROBLEM — M54.50 LOW BACK PAIN: Status: ACTIVE | Noted: 2022-06-28

## 2022-07-29 PROBLEM — F17.200 SMOKING: Status: ACTIVE | Noted: 2022-07-29

## 2022-07-29 PROCEDURE — 99214 OFFICE O/P EST MOD 30 MIN: CPT | Mod: PBBFAC | Performed by: NURSE PRACTITIONER

## 2022-07-29 RX ORDER — ONDANSETRON 4 MG/1
8 TABLET, ORALLY DISINTEGRATING ORAL NIGHTLY PRN
Qty: 30 TABLET | Refills: 2 | Status: SHIPPED | OUTPATIENT
Start: 2022-07-29 | End: 2022-12-12

## 2022-07-29 RX ORDER — HYDROCHLOROTHIAZIDE 12.5 MG/1
12.5 TABLET ORAL DAILY
Qty: 30 TABLET | Refills: 2 | Status: ON HOLD | OUTPATIENT
Start: 2022-07-29 | End: 2022-09-16 | Stop reason: HOSPADM

## 2022-07-29 RX ORDER — HYDROCHLOROTHIAZIDE 12.5 MG/1
12.5 TABLET ORAL DAILY
Qty: 30 TABLET | Refills: 11 | Status: SHIPPED | OUTPATIENT
Start: 2022-07-29 | End: 2022-07-29

## 2022-07-29 RX ORDER — PREGABALIN 75 MG/1
75 CAPSULE ORAL NIGHTLY PRN
Qty: 30 CAPSULE | Refills: 2 | Status: SHIPPED | OUTPATIENT
Start: 2022-07-29 | End: 2022-12-12

## 2022-07-29 RX ORDER — CYCLOBENZAPRINE HCL 10 MG
10 TABLET ORAL 3 TIMES DAILY PRN
Qty: 90 TABLET | Refills: 2 | Status: SHIPPED | OUTPATIENT
Start: 2022-07-29 | End: 2022-10-27

## 2022-08-01 ENCOUNTER — OFFICE VISIT (OUTPATIENT)
Dept: FAMILY MEDICINE | Facility: CLINIC | Age: 62
End: 2022-08-01
Payer: MEDICAID

## 2022-08-01 VITALS
HEART RATE: 104 BPM | SYSTOLIC BLOOD PRESSURE: 133 MMHG | HEIGHT: 65 IN | BODY MASS INDEX: 20.06 KG/M2 | TEMPERATURE: 98 F | WEIGHT: 120.38 LBS | DIASTOLIC BLOOD PRESSURE: 91 MMHG

## 2022-08-01 DIAGNOSIS — K44.9 HIATAL HERNIA: Primary | ICD-10-CM

## 2022-08-01 PROCEDURE — 99214 OFFICE O/P EST MOD 30 MIN: CPT | Mod: PBBFAC

## 2022-08-01 RX ORDER — SUCRALFATE 1 G/1
1 TABLET ORAL 2 TIMES DAILY
Qty: 40 TABLET | Refills: 0 | Status: SHIPPED | OUTPATIENT
Start: 2022-08-01 | End: 2022-09-06

## 2022-08-01 NOTE — PROGRESS NOTES
"61 y/o female here today with complaint of hiatal hernia pain. She was last seen in our clinic on 7/29 for hiatal pain and BP.     Acute issues:  Hiatal hernia pain   -Feels like "someone has reached into her stomach and is squeezing her insides"  -Describes the pain as intermittent. States the episodes occurs at least twice/day, pain can last up to 17 hrs, normally up to 2 hrs  -Pain is located epigastric region  -Takes pepto bismol and Prilosec and they help  -Oxycodone does not help    -Was previously referred to [facility] for hiatal hernia repair   -Hx of rectal mass, has declined surgical intervention in the past. Declines management at this time  -3/17/22 MRI showed There is prominent mid to upper rectal wall thickening which is near circumferential with perhaps some sparing around 12:00. The most  distal aspect of the wall thickening is approximately 7-8 cm from the  anal verge. Affected segment is approximately 5-6 cm in length. This  extends to the muscularis propria but does not clearly invade it. No  enlarged perirectal lymph nodes are seen. Colonic diverticulosis is  seen more superiorly.  -Esophagram 3/18/22: showed Small sliding hiatal hernia with small volume reflux. Mild esophageal dysmotility.  -Reports acid reflux, diaphoresis, trembling, epigastric pain has woke up from her sleep   -Denies N/V, difficulty swallowing, chest pain, diarrhea/constipation       ROS   See above     PE:  Vitals:    08/01/22 1039   BP: (!) 133/91   Pulse: 104   Temp: 98.1 °F (36.7 °C)   General: The patient is pleasant and cooperative and in no acute distress.  Chest: Respirations are non-labored.  Clear to auscultation with bilateral breath sounds.  Cardiovascular: Regular rate and rhythm.  Abdomen: Soft, distended, epigastric tenderness, with positive bowel sounds.         A/P  Hiatal hernia  -Called [facility] Surgery clinic and they accept free care. Referral sent   -Added Carafate to regimen     Rectal " mass  -Declined any surgical intervention at this time

## 2022-08-24 ENCOUNTER — HOSPITAL ENCOUNTER (EMERGENCY)
Facility: HOSPITAL | Age: 62
Discharge: HOME | End: 2022-08-24
Attending: FAMILY MEDICINE
Payer: MEDICAID

## 2022-08-24 VITALS
HEART RATE: 97 BPM | BODY MASS INDEX: 17.68 KG/M2 | OXYGEN SATURATION: 97 % | WEIGHT: 110 LBS | SYSTOLIC BLOOD PRESSURE: 151 MMHG | DIASTOLIC BLOOD PRESSURE: 76 MMHG | HEIGHT: 66 IN | RESPIRATION RATE: 16 BRPM | TEMPERATURE: 98 F
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
## 2022-08-24 DIAGNOSIS — M25.561 ACUTE PAIN OF RIGHT KNEE: Primary | ICD-10-CM

## 2022-08-24 PROCEDURE — 99283 EMERGENCY DEPT VISIT LOW MDM: CPT | Mod: 25

## 2022-08-24 NOTE — ED PROVIDER NOTES
Encounter Date: 2022       History     Chief Complaint   Patient presents with    Knee Pain     PT REPORTS FALLING LAST PM AND LANDED ON RT KNEE.      The patient presents with knee pain. The onset was 1 day ago.  The course/duration of symptoms is constant. Type of injury: tripped and fell. Location: Right knee. The character of symptoms is pain.  The degree at present is moderate. There are exacerbating factors including movement, weight bearing and walking.  The relieving factor is rest. Risk factors consist of none. Prior episodes: none. Therapy today: none. Associated symptoms: none.           Review of patient's allergies indicates:   Allergen Reactions    Amoxicillin-pot clavulanate Hives     Other reaction(s): Swelling    Iodine and iodide containing products      Other reaction(s): Burning sensation    Sulfamethoxazole-trimethoprim      Other reaction(s): Constipation, Dizziness, Loss of appetite, Rapid heart beat, Shaking     Past Medical History:   Diagnosis Date    Asthma     GERD (gastroesophageal reflux disease)     Hypertension     Lumbago     Sciatica      Past Surgical History:   Procedure Laterality Date    APPENDECTOMY       SECTION      COLONOSCOPY      ESOPHAGOGASTRECTOMY      HYSTERECTOMY      POLYPECTOMY      RHINOPLASTY      TONSILLECTOMY       Family History   Problem Relation Age of Onset    Asthma Father     Heart disease Brother     Arthritis Brother      Social History     Tobacco Use    Smoking status: Former Smoker     Types: Cigarettes    Smokeless tobacco: Never Used   Substance Use Topics    Alcohol use: Not Currently    Drug use: Never     Review of Systems   Constitutional: Negative for fever.   HENT: Negative for sore throat.    Respiratory: Negative for shortness of breath.    Cardiovascular: Negative for chest pain.   Gastrointestinal: Negative for nausea.   Genitourinary: Negative for dysuria.   Musculoskeletal: Positive for arthralgias.  Negative for back pain.   Skin: Negative for rash.   Neurological: Negative for weakness.   Hematological: Does not bruise/bleed easily.   All other systems reviewed and are negative.      Physical Exam     Initial Vitals [08/24/22 1158]   BP Pulse Resp Temp SpO2   (!) 151/76 97 16 97.9 °F (36.6 °C) 97 %      MAP       --         Physical Exam    Nursing note and vitals reviewed.  Constitutional: She appears well-developed and well-nourished.   HENT:   Head: Normocephalic and atraumatic.   Neck: Neck supple.   Normal range of motion.  Cardiovascular: Normal rate, regular rhythm, normal heart sounds and intact distal pulses.   Pulmonary/Chest: Breath sounds normal.   Abdominal: Abdomen is soft. Bowel sounds are normal.   Musculoskeletal:         General: Normal range of motion.      Cervical back: Normal range of motion and neck supple.      Comments: Mild ttp and swelling R knee, FROM, good distal pulses, NVI     Neurological: She is alert. She has normal strength.   Skin: Skin is warm and dry.   Psychiatric: She has a normal mood and affect.         ED Course   Procedures  Labs Reviewed - No data to display       Imaging Results          X-Ray Knee 1 or 2 View Right (Final result)  Result time 08/24/22 13:08:39    Final result by Syed Crandall MD (08/24/22 13:08:39)                 Impression:      No acute osseous abnormality.      Electronically signed by: Syed Crandall  Date:    08/24/2022  Time:    13:08             Narrative:    EXAMINATION:  XR KNEE 1 OR 2 VIEW RIGHT    CLINICAL HISTORY:  Unspecified fall, initial encounter    COMPARISON:  None.    FINDINGS:  No acute displaced fractures or dislocations.    Joint spaces preserved.    No blastic or lytic lesions.    Soft tissues within normal limits.                                 Medications - No data to display  Medical Decision Making:   History:   Old Records Summarized: records from clinic visits and records from previous admission(s).  Clinical  Tests:   Radiological Study: Ordered and Reviewed  Declined pain medication in the ER. Wishes to take otc pain medication if needed.    1:52 PM DISPOSITION: The patient is resting comfortably in no acute distress.  She is hemodynamically stable and is without objective evidence for acute process requiring urgent intervention or hospitalization. I provided counseling to patient with regard to condition, the treatment plan, specific conditions for return, and the importance of follow up. Detailed written and verbal instructions provided to patient and she expressed a verbal understanding of the discharge instructions and overall management plan. Reiterated the importance of medication administration and safety and advised patient to follow up with primary care provider in 3-5 days or sooner if needed.  Answered questions at this time. The patient is stable for discharge.                         Clinical Impression:   Final diagnoses:  [W19.XXXA] Fall  [M25.561] Acute pain of right knee (Primary)          ED Disposition Condition    Discharge Stable        ED Prescriptions     None        Follow-up Information     Follow up With Specialties Details Why Contact Info    Kyra Reed MD Family Medicine In 3 days  [address] [facility] [address]  [phone]      [facility] Emergency Medicine  If symptoms worsen [address] [facility] [id number] [phone]           Tao Alvarenga, CLIF  08/24/22 8010

## 2022-08-24 NOTE — Clinical Note
"Lore Stewart" Haylie was seen and treated in our emergency department on 8/24/2022.  She may return to work on 08/26/2022.       If you have any questions or concerns, please don't hesitate to call.       LPN    "

## 2022-08-26 ENCOUNTER — TELEPHONE (OUTPATIENT)
Dept: ENDOSCOPY | Facility: HOSPITAL | Age: 62
End: 2022-08-26
Payer: MEDICAID

## 2022-09-05 PROBLEM — E78.5 HYPERLIPIDEMIA: Status: ACTIVE | Noted: 2022-09-05

## 2022-09-05 PROBLEM — I10 HYPERTENSION: Status: ACTIVE | Noted: 2022-09-05

## 2022-09-06 ENCOUNTER — OFFICE VISIT (OUTPATIENT)
Dept: FAMILY MEDICINE | Facility: CLINIC | Age: 62
End: 2022-09-06
Payer: MEDICAID

## 2022-09-06 VITALS
TEMPERATURE: 98 F | SYSTOLIC BLOOD PRESSURE: 136 MMHG | WEIGHT: 126.31 LBS | DIASTOLIC BLOOD PRESSURE: 75 MMHG | OXYGEN SATURATION: 98 % | HEART RATE: 95 BPM | BODY MASS INDEX: 20.3 KG/M2 | HEIGHT: 66 IN

## 2022-09-06 DIAGNOSIS — I10 PRIMARY HYPERTENSION: Primary | ICD-10-CM

## 2022-09-06 DIAGNOSIS — T14.8XXA CONTUSION OF BONE: ICD-10-CM

## 2022-09-06 DIAGNOSIS — E78.5 HYPERLIPIDEMIA, UNSPECIFIED HYPERLIPIDEMIA TYPE: ICD-10-CM

## 2022-09-06 DIAGNOSIS — K44.9 HIATAL HERNIA: ICD-10-CM

## 2022-09-06 PROCEDURE — 99213 OFFICE O/P EST LOW 20 MIN: CPT | Mod: PBBFAC | Performed by: NURSE PRACTITIONER

## 2022-09-06 RX ORDER — ALBUTEROL SULFATE 90 UG/1
2 AEROSOL, METERED RESPIRATORY (INHALATION) EVERY 4 HOURS PRN
Qty: 18 G | Refills: 11 | Status: SHIPPED | OUTPATIENT
Start: 2022-09-06 | End: 2023-06-16 | Stop reason: SDUPTHER

## 2022-09-06 NOTE — PROGRESS NOTES
Family Medicine Clinic Note:    PCP: Kyra Reed MD    Lore Stallings is a 62 y.o. female presents to clinic today for cc:       Subjective:   Acute: States that she slipped  after walking from the den on 8/24/22. States the left ankle turned inward and she hit the right knee.  She has been able to walk but pain with bending and kneeling on the knee. She has taken the percocet without relief.   She did an x-ray when she went to the ED but unsure when? No recored. Difficuly straigntening out the knee    HLD  ASCVD 13.2%  Discussed benefits and risk of statin this visit  She states that she is only taking the lyrica, flexeril, prilosec, and proair.    She states that she does not want to take any more medication.     Chronic:  HTN:  Bp today 136/75  Denies any chest pain, SOB, palpitations  Had not  started HCTZ 12.5 mg daily last visit and does not want to take the medication.    Rctal Mass: Declines any surgical interventions at this time.    Hiatal Hernia:  Referral to [facility] for surgical was sent again last visit, accepts free care pending  Carafate was added last visit but patient states that she is not taking the medication.        HM:  Lung Nodules: Low density CT chest 6/15/2022  and No detrimental interval change from prior exam.  Right upper lobe pulmonary nodule is unchanged.  On alendronate weekly  Will hold on the HM until after surgical procedures    ROS      Constitutional: No fevers, chills or fatigue  Respiratory: no trouble breathing or SOB  Cardiovascular: no chest pain or tightness, no SOB on activity, no ankle swelling  Gastrointestinal: see HPI  Current Outpatient Medications   Medication Sig Dispense Refill    alendronate (FOSAMAX) 10 MG Tab Take 10 mg by mouth once daily.      cyclobenzaprine (FLEXERIL) 10 MG tablet Take 1 tablet (10 mg total) by mouth 3 (three) times daily as needed for Muscle spasms. 90 tablet 2    hydroCHLOROthiazide (HYDRODIURIL) 12.5 MG Tab Take 1 tablet (12.5  mg total) by mouth once daily. 30 tablet 2    omeprazole (PRILOSEC) 20 MG capsule Take 1 capsule (20 mg total) by mouth once daily. 30 capsule 11    ondansetron (ZOFRAN-ODT) 4 MG TbDL Take 2 tablets (8 mg total) by mouth nightly as needed (nausea). 30 tablet 2    oxyCODONE-acetaminophen (PERCOCET) 7.5-325 mg per tablet Take 1 tablet by mouth every 24 hours as needed for Pain. 30 tablet 0    pregabalin (LYRICA) 75 MG capsule Take 1 capsule (75 mg total) by mouth nightly as needed (sciatica). 30 capsule 2    PROAIR HFA 90 mcg/actuation inhaler Inhale 2 puffs into the lungs every 4 (four) hours as needed.      sucralfate (CARAFATE) 1 gram tablet Take 1 tablet (1 g total) by mouth 2 (two) times daily. 40 tablet 0     No current facility-administered medications for this visit.       Objective:     There were no vitals taken for this visit.  BP Readings from Last 3 Encounters:   08/24/22 (!) 151/76   08/01/22 (!) 133/91   07/29/22 (!) 166/83     Wt Readings from Last 3 Encounters:   08/24/22 49.9 kg (110 lb)   08/01/22 54.6 kg (120 lb 5.9 oz)   07/29/22 55.1 kg (121 lb 6.4 oz)     No results found for this or any previous visit (from the past 200 hour(s)).  There is no height or weight on file to calculate BMI.    Physical Exam  Constitutional: NAD  Lungs: CTAB, No crackles, No wheezes  Cardiovascular: Normal heart sounds, No MRG  Abdomen: Soft, Nontender, No palpable hepatosplenomegaly, No abdominal masses, No ascites  Extremities: No cyanosis, No clubbing, No edema, negative left anterior drawer and negative Mc Murrays test,     The 10-year ASCVD risk score (Maryam ALMANZAR, et al., 2019) is: 11%    Values used to calculate the score:      Age: 62 years      Sex: Female      Is Non- : No      Diabetic: No      Tobacco smoker: No      Systolic Blood Pressure: 151 mmHg      Is BP treated: Yes      HDL Cholesterol: 47 mg/dL      Total Cholesterol: 240 mg/dL      Assessment:   Lore Stallings is a 62 y.o.  female with:    1. Primary hypertension    2. Hiatal hernia    3. Hyperlipidemia, unspecified hyperlipidemia type    4. Rectal Mass    Plan:     HTN:   Follow low sodium diet (2 grams a day)  -Control high blood pressure ( goal BP < 130/80, please record BP at home every day and bring log to next office visit)  -Exercise at least 30 minutes a day, 5 days a week as toleraable  -Maintain healthy weight.  -Decrease or stop alcohol use  -Do not smoke  -Stay well hydrated  -Do not take NSAIDs (Ibuprofen, Naproxen, Aleve, Advil, Toradol, Mobic), may take only Tylenol as needed for pain/headaches.  -Take cholesterol-lowering medications as prescribed (LDL goal <100)  -Nonsteroidal anti-inflammatory drugs (NSAIDs) may disrupt control of blood pressure in hypertensive patients and increase their risk of morbidity, mortality, and the costs of care. In general, people with high blood pressure should use acetaminophen or possibly aspirin for over-the-counter pain relief.   -Encourage her to  take the  HCTZ 12.5 mg daily.      HLD:  ASCVD score now 11%. Discussed risks and benefits regarding starting a statin.  She is declining starting at this time.     Hiatal Hernia:  Encouraged taking the Carafate  Will contact surgery to see about the hiatal hernia surgery    Rectal Mass: Declining surgical intervention at this time. Discussed the need for surgical intervention regarding risks and pt still not interested at this time.    Knee contusion  Instructed on RICE. Pt verbalized understanding.    MRI not needed at this time.  Reviewed prior x-ray  of knee and no fracture or dislocation noted.       RTC in 2 months          No follow-ups on file.    Future Appointments   Date Time Provider Department Center   9/6/2022  1:00 PM RESIDENT 13, [facility] FAMILY MEDICINE [facility] RES [place]    9/28/2022 10:40 AM RESIDENT 16, [facility] FAMILY MEDICINE [facility] RES [facility]    10/10/2022  2:20 PM PERFECTO Gonzalez MD [facility] GBR [facility] Naqvi    3/27/2023  1:10 PM ABDI Goddard [facility] GYN [facility]       Staff: Dr. Obi Swartz MD  Family Medicine PGY-2      9/12/22 at 1608, Addendum: Spoke to [facility] surgery clinic regarding surgery referral. Dr. Weaver at [facility]'[s office states that they did not receive the referral from the [facility] surgery clinic.  I spoke to [facility]'s surgery clinic's office and they said that the referral was sent but will resend.  Attempted all of the patient's numbers and unable to leave a message on any of the numbers provider except for her 's phone.  Left message for her to go to the ED ASAP regarding abnormal liver functions.

## 2022-09-12 ENCOUNTER — HOSPITAL ENCOUNTER (INPATIENT)
Facility: HOSPITAL | Age: 62
LOS: 6 days | Discharge: HOME | DRG: 872 | End: 2022-09-18
Attending: INTERNAL MEDICINE | Admitting: FAMILY MEDICINE
Payer: MEDICAID

## 2022-09-12 DIAGNOSIS — R74.01 TRANSAMINITIS: Primary | ICD-10-CM

## 2022-09-12 DIAGNOSIS — R65.10 SYSTEMIC INFLAMMATORY RESPONSE SYNDROME (SIRS): ICD-10-CM

## 2022-09-12 DIAGNOSIS — R10.9 ABDOMINAL DISCOMFORT: ICD-10-CM

## 2022-09-12 DIAGNOSIS — R11.0 NAUSEA: ICD-10-CM

## 2022-09-12 DIAGNOSIS — E86.0 DEHYDRATION, MODERATE: ICD-10-CM

## 2022-09-12 DIAGNOSIS — E83.39 HYPOPHOSPHATEMIA: ICD-10-CM

## 2022-09-12 LAB
ALBUMIN SERPL-MCNC: 3.4 GM/DL (ref 3.4–4.8)
ALBUMIN/GLOB SERPL: 0.9 RATIO (ref 1.1–2)
ALP SERPL-CCNC: 154 UNIT/L (ref 40–150)
ALT SERPL-CCNC: 628 UNIT/L (ref 0–55)
AMMONIA PLAS-MSCNC: 49.8 UMOL/L (ref 18–72)
AMPHET UR QL SCN: NEGATIVE
APPEARANCE UR: CLEAR
AST SERPL-CCNC: 1085 UNIT/L (ref 5–34)
BACTERIA #/AREA URNS AUTO: ABNORMAL /HPF
BARBITURATE SCN PRESENT UR: NEGATIVE
BASOPHILS # BLD AUTO: 0.04 X10(3)/MCL (ref 0–0.2)
BASOPHILS NFR BLD AUTO: 0.2 %
BENZODIAZ UR QL SCN: NEGATIVE
BILIRUB UR QL STRIP.AUTO: NEGATIVE MG/DL
BILIRUBIN DIRECT+TOT PNL SERPL-MCNC: 1.1 MG/DL (ref 0–0.5)
BILIRUBIN DIRECT+TOT PNL SERPL-MCNC: 1.9 MG/DL
BUN SERPL-MCNC: 11.7 MG/DL (ref 9.8–20.1)
CALCIUM SERPL-MCNC: 9.3 MG/DL (ref 8.4–10.2)
CANNABINOIDS UR QL SCN: POSITIVE
CHLORIDE SERPL-SCNC: 106 MMOL/L (ref 98–107)
CO2 SERPL-SCNC: 25 MMOL/L (ref 23–31)
COCAINE UR QL SCN: NEGATIVE
COLOR UR AUTO: YELLOW
CREAT SERPL-MCNC: 0.6 MG/DL (ref 0.55–1.02)
EOSINOPHIL # BLD AUTO: 0.01 X10(3)/MCL (ref 0–0.9)
EOSINOPHIL NFR BLD AUTO: 0 %
ERYTHROCYTE [DISTWIDTH] IN BLOOD BY AUTOMATED COUNT: 14.2 % (ref 11.5–17)
ETHANOL SERPL-MCNC: <10 MG/DL
FENTANYL UR QL SCN: NEGATIVE
FLUAV AG UPPER RESP QL IA.RAPID: NOT DETECTED
FLUBV AG UPPER RESP QL IA.RAPID: NOT DETECTED
GFR SERPLBLD CREATININE-BSD FMLA CKD-EPI: >60 MLS/MIN/1.73/M2
GLOBULIN SER-MCNC: 3.6 GM/DL (ref 2.4–3.5)
GLUCOSE SERPL-MCNC: 137 MG/DL (ref 82–115)
GLUCOSE UR QL STRIP.AUTO: NORMAL MG/DL
HAV IGM SERPL QL IA: NONREACTIVE
HBV CORE IGM SERPL QL IA: NONREACTIVE
HBV SURFACE AG SERPL QL IA: NONREACTIVE
HCT VFR BLD AUTO: 40.2 % (ref 37–47)
HCV AB SERPL QL IA: NONREACTIVE
HGB BLD-MCNC: 12.6 GM/DL (ref 12–16)
HYALINE CASTS #/AREA URNS LPF: ABNORMAL /LPF
IMM GRANULOCYTES # BLD AUTO: 0.1 X10(3)/MCL (ref 0–0.04)
IMM GRANULOCYTES NFR BLD AUTO: 0.5 %
KETONES UR QL STRIP.AUTO: NEGATIVE MG/DL
LACTATE SERPL-SCNC: 1.9 MMOL/L (ref 0.5–2.2)
LEUKOCYTE ESTERASE UR QL STRIP.AUTO: NEGATIVE UNIT/L
LIPASE SERPL-CCNC: 48 U/L
LYMPHOCYTES # BLD AUTO: 0.46 X10(3)/MCL (ref 0.6–4.6)
LYMPHOCYTES NFR BLD AUTO: 2.2 %
MAGNESIUM SERPL-MCNC: 1.8 MG/DL (ref 1.6–2.6)
MCH RBC QN AUTO: 29.4 PG (ref 27–31)
MCHC RBC AUTO-ENTMCNC: 31.3 MG/DL (ref 33–36)
MCV RBC AUTO: 93.9 FL (ref 80–94)
MDMA UR QL SCN: NEGATIVE
MONOCYTES # BLD AUTO: 0.2 X10(3)/MCL (ref 0.1–1.3)
MONOCYTES NFR BLD AUTO: 1 %
MUCOUS THREADS URNS QL MICRO: ABNORMAL /LPF
NEUTROPHILS # BLD AUTO: 19.8 X10(3)/MCL (ref 2.1–9.2)
NEUTROPHILS NFR BLD AUTO: 96.1 %
NITRITE UR QL STRIP.AUTO: NEGATIVE
NRBC BLD AUTO-RTO: 0 %
OPIATES UR QL SCN: NEGATIVE
PCP UR QL: NEGATIVE
PH UR STRIP.AUTO: 7.5 [PH]
PH UR: 7.5 [PH] (ref 3–11)
PHOSPHATE SERPL-MCNC: 1.4 MG/DL (ref 2.3–4.7)
PLATELET # BLD AUTO: 263 X10(3)/MCL (ref 130–400)
PMV BLD AUTO: 10.6 FL (ref 7.4–10.4)
POCT GLUCOSE: 146 MG/DL (ref 70–110)
POTASSIUM SERPL-SCNC: 4.1 MMOL/L (ref 3.5–5.1)
PROT SERPL-MCNC: 7 GM/DL (ref 5.8–7.6)
PROT UR QL STRIP.AUTO: NEGATIVE MG/DL
RBC # BLD AUTO: 4.28 X10(6)/MCL (ref 4.2–5.4)
RBC #/AREA URNS AUTO: ABNORMAL /HPF
RBC UR QL AUTO: NEGATIVE UNIT/L
SARS-COV-2 RNA RESP QL NAA+PROBE: NOT DETECTED
SODIUM SERPL-SCNC: 142 MMOL/L (ref 136–145)
SP GR UR STRIP.AUTO: 1.02
SPECIFIC GRAVITY, URINE AUTO (.000) (OHS): 1.02 (ref 1–1.03)
SQUAMOUS #/AREA URNS LPF: ABNORMAL /HPF
UROBILINOGEN UR STRIP-ACNC: ABNORMAL MG/DL
WBC # SPEC AUTO: 20.7 X10(3)/MCL (ref 4.5–11.5)
WBC #/AREA URNS AUTO: ABNORMAL /HPF

## 2022-09-12 PROCEDURE — 63600175 PHARM REV CODE 636 W HCPCS: Performed by: INTERNAL MEDICINE

## 2022-09-12 PROCEDURE — 96367 TX/PROPH/DG ADDL SEQ IV INF: CPT

## 2022-09-12 PROCEDURE — 96366 THER/PROPH/DIAG IV INF ADDON: CPT

## 2022-09-12 PROCEDURE — 21400001 HC TELEMETRY ROOM

## 2022-09-12 PROCEDURE — 83605 ASSAY OF LACTIC ACID: CPT | Performed by: INTERNAL MEDICINE

## 2022-09-12 PROCEDURE — 82248 BILIRUBIN DIRECT: CPT | Performed by: INTERNAL MEDICINE

## 2022-09-12 PROCEDURE — 82140 ASSAY OF AMMONIA: CPT | Performed by: INTERNAL MEDICINE

## 2022-09-12 PROCEDURE — 80074 ACUTE HEPATITIS PANEL: CPT | Performed by: INTERNAL MEDICINE

## 2022-09-12 PROCEDURE — 63600175 PHARM REV CODE 636 W HCPCS: Performed by: STUDENT IN AN ORGANIZED HEALTH CARE EDUCATION/TRAINING PROGRAM

## 2022-09-12 PROCEDURE — 83690 ASSAY OF LIPASE: CPT | Performed by: INTERNAL MEDICINE

## 2022-09-12 PROCEDURE — 81001 URINALYSIS AUTO W/SCOPE: CPT | Performed by: INTERNAL MEDICINE

## 2022-09-12 PROCEDURE — 80053 COMPREHEN METABOLIC PANEL: CPT | Performed by: INTERNAL MEDICINE

## 2022-09-12 PROCEDURE — 25000003 PHARM REV CODE 250: Performed by: INTERNAL MEDICINE

## 2022-09-12 PROCEDURE — 80307 DRUG TEST PRSMV CHEM ANLYZR: CPT | Performed by: INTERNAL MEDICINE

## 2022-09-12 PROCEDURE — 96365 THER/PROPH/DIAG IV INF INIT: CPT

## 2022-09-12 PROCEDURE — S0030 INJECTION, METRONIDAZOLE: HCPCS | Performed by: INTERNAL MEDICINE

## 2022-09-12 PROCEDURE — 36415 COLL VENOUS BLD VENIPUNCTURE: CPT | Performed by: INTERNAL MEDICINE

## 2022-09-12 PROCEDURE — 85025 COMPLETE CBC W/AUTO DIFF WBC: CPT | Performed by: INTERNAL MEDICINE

## 2022-09-12 PROCEDURE — 25000003 PHARM REV CODE 250: Performed by: STUDENT IN AN ORGANIZED HEALTH CARE EDUCATION/TRAINING PROGRAM

## 2022-09-12 PROCEDURE — 82077 ASSAY SPEC XCP UR&BREATH IA: CPT | Performed by: INTERNAL MEDICINE

## 2022-09-12 PROCEDURE — 84100 ASSAY OF PHOSPHORUS: CPT | Performed by: INTERNAL MEDICINE

## 2022-09-12 PROCEDURE — 87040 BLOOD CULTURE FOR BACTERIA: CPT | Performed by: INTERNAL MEDICINE

## 2022-09-12 PROCEDURE — 82962 GLUCOSE BLOOD TEST: CPT | Mod: 59

## 2022-09-12 PROCEDURE — 87636 SARSCOV2 & INF A&B AMP PRB: CPT | Performed by: INTERNAL MEDICINE

## 2022-09-12 PROCEDURE — 99285 EMERGENCY DEPT VISIT HI MDM: CPT | Mod: 25

## 2022-09-12 PROCEDURE — C9113 INJ PANTOPRAZOLE SODIUM, VIA: HCPCS | Performed by: STUDENT IN AN ORGANIZED HEALTH CARE EDUCATION/TRAINING PROGRAM

## 2022-09-12 PROCEDURE — 83735 ASSAY OF MAGNESIUM: CPT | Performed by: INTERNAL MEDICINE

## 2022-09-12 RX ORDER — IBUPROFEN 600 MG/1
600 TABLET ORAL EVERY 6 HOURS PRN
Status: DISCONTINUED | OUTPATIENT
Start: 2022-09-12 | End: 2022-09-18 | Stop reason: HOSPADM

## 2022-09-12 RX ORDER — CIPROFLOXACIN 2 MG/ML
400 INJECTION, SOLUTION INTRAVENOUS
Status: COMPLETED | OUTPATIENT
Start: 2022-09-12 | End: 2022-09-12

## 2022-09-12 RX ORDER — SODIUM CHLORIDE, SODIUM LACTATE, POTASSIUM CHLORIDE, CALCIUM CHLORIDE 600; 310; 30; 20 MG/100ML; MG/100ML; MG/100ML; MG/100ML
INJECTION, SOLUTION INTRAVENOUS CONTINUOUS
Status: DISCONTINUED | OUTPATIENT
Start: 2022-09-12 | End: 2022-09-14

## 2022-09-12 RX ORDER — HYDROCHLOROTHIAZIDE 12.5 MG/1
12.5 TABLET ORAL DAILY
Status: DISCONTINUED | OUTPATIENT
Start: 2022-09-13 | End: 2022-09-15

## 2022-09-12 RX ORDER — SODIUM CHLORIDE 0.9 % (FLUSH) 0.9 %
10 SYRINGE (ML) INJECTION
Status: DISCONTINUED | OUTPATIENT
Start: 2022-09-12 | End: 2022-09-18 | Stop reason: HOSPADM

## 2022-09-12 RX ORDER — PANTOPRAZOLE SODIUM 40 MG/10ML
40 INJECTION, POWDER, LYOPHILIZED, FOR SOLUTION INTRAVENOUS EVERY 24 HOURS
Status: DISCONTINUED | OUTPATIENT
Start: 2022-09-12 | End: 2022-09-18 | Stop reason: HOSPADM

## 2022-09-12 RX ORDER — METRONIDAZOLE 500 MG/100ML
500 INJECTION, SOLUTION INTRAVENOUS
Status: DISCONTINUED | OUTPATIENT
Start: 2022-09-12 | End: 2022-09-12

## 2022-09-12 RX ADMIN — CIPROFLOXACIN 400 MG: 2 INJECTION, SOLUTION INTRAVENOUS at 05:09

## 2022-09-12 RX ADMIN — PIPERACILLIN AND TAZOBACTAM 4.5 G: 4; .5 INJECTION, POWDER, LYOPHILIZED, FOR SOLUTION INTRAVENOUS; PARENTERAL at 09:09

## 2022-09-12 RX ADMIN — PANTOPRAZOLE SODIUM 40 MG: 40 INJECTION, POWDER, FOR SOLUTION INTRAVENOUS at 09:09

## 2022-09-12 RX ADMIN — FOLIC ACID: 5 INJECTION, SOLUTION INTRAMUSCULAR; INTRAVENOUS; SUBCUTANEOUS at 01:09

## 2022-09-12 RX ADMIN — SODIUM CHLORIDE, POTASSIUM CHLORIDE, SODIUM LACTATE AND CALCIUM CHLORIDE: 600; 310; 30; 20 INJECTION, SOLUTION INTRAVENOUS at 10:09

## 2022-09-12 RX ADMIN — METRONIDAZOLE 500 MG: 5 INJECTION, SOLUTION INTRAVENOUS at 06:09

## 2022-09-12 NOTE — ED PROVIDER NOTES
Encounter Date: 2022       History     Chief Complaint   Patient presents with    Abdominal Pain     Epigastric pain and generalized aches started this morning.     States having pain from her hiatal hernia    The history is provided by the patient and the EMS personnel.   Abdominal Pain  The current episode started today. The onset of the illness was gradual. The abdominal pain is located in the epigastric region. The abdominal pain does not radiate. The severity of the abdominal pain is 7/10. The abdominal pain is relieved by nothing. The other symptoms of the illness include nausea. The other symptoms of the illness do not include fever, jaundice, shortness of breath or dysuria.   The patient states that she believes she is currently not pregnant. The patient has not had a change in bowel habit. Symptoms associated with the illness do not include constipation or back pain.   Review of patient's allergies indicates:   Allergen Reactions    Amoxicillin-pot clavulanate Hives     Other reaction(s): Swelling    Iodine and iodide containing products      Other reaction(s): Burning sensation    Sulfamethoxazole-trimethoprim      Other reaction(s): Constipation, Dizziness, Loss of appetite, Rapid heart beat, Shaking     Past Medical History:   Diagnosis Date    Asthma     GERD (gastroesophageal reflux disease)     Hypertension     Lumbago     Sciatica      Past Surgical History:   Procedure Laterality Date    APPENDECTOMY       SECTION      COLONOSCOPY      ERCP W/ SPHICTEROTOMY N/A 2022    Procedure: ERCP, WITH SPHINCTEROTOMY;  Surgeon: Iris Sandy MD;  Location: [facility] ENDOSCOPY;  Service: Gastroenterology;  Laterality: N/A;    ESOPHAGOGASTRECTOMY      HYSTERECTOMY      POLYPECTOMY      RHINOPLASTY      TONSILLECTOMY       Family History   Problem Relation Age of Onset    Asthma Father     Heart disease Brother     Arthritis Brother      Social History     Tobacco Use    Smoking status: Former      Types: Cigarettes    Smokeless tobacco: Never   Substance Use Topics    Alcohol use: Not Currently    Drug use: Never     Review of Systems   Constitutional:  Negative for fever.   HENT:  Negative for sore throat.    Respiratory:  Negative for shortness of breath.    Cardiovascular:  Negative for chest pain.   Gastrointestinal:  Positive for abdominal pain and nausea. Negative for constipation and jaundice.   Genitourinary:  Negative for dysuria.   Musculoskeletal:  Negative for back pain.   Skin:  Negative for rash.   Neurological:  Negative for weakness.   Hematological:  Does not bruise/bleed easily.   Psychiatric/Behavioral:  Positive for confusion.    All other systems reviewed and are negative.    Physical Exam     Initial Vitals [09/12/22 1232]   BP Pulse Resp Temp SpO2   (!) 177/94 (!) 111 (!) 22 97.7 °F (36.5 °C) 98 %      MAP       --         Physical Exam    Nursing note and vitals reviewed.  Constitutional: She appears well-developed. She appears distressed (Mild).   Underweight   HENT:   Head: Normocephalic and atraumatic.   Dry oral mucosa   Eyes: Conjunctivae are normal. Pupils are equal, round, and reactive to light.   Neck: Neck supple.   Normal range of motion.  Cardiovascular:  Normal rate, regular rhythm, normal heart sounds and intact distal pulses.           Pulmonary/Chest: Breath sounds normal.   Abdominal: Abdomen is soft. Bowel sounds are normal. She exhibits distension. There is no abdominal tenderness. There is no rebound and no guarding.   Musculoskeletal:         General: No edema. Normal range of motion.      Cervical back: Normal range of motion and neck supple.     Neurological: She is alert and oriented to person, place, and time. She has normal strength.   Skin: Skin is warm and dry. No rash noted. There is pallor.   Psychiatric: Her behavior is normal.       ED Course   Procedures  Labs Reviewed   COMPREHENSIVE METABOLIC PANEL - Abnormal; Notable for the following components:        Result Value    Glucose Level 137 (*)     Globulin 3.6 (*)     Albumin/Globulin Ratio 0.9 (*)     Bilirubin Total 1.9 (*)     Alkaline Phosphatase 154 (*)     Alanine Aminotransferase 628 (*)     Aspartate Aminotransferase 1,085 (*)     All other components within normal limits   PHOSPHORUS - Abnormal; Notable for the following components:    Phosphorus Level 1.4 (*)     All other components within normal limits   CBC WITH DIFFERENTIAL - Abnormal; Notable for the following components:    WBC 20.7 (*)     MCHC 31.3 (*)     MPV 10.6 (*)     Lymph # 0.46 (*)     Neut # 19.8 (*)     IG# 0.10 (*)     All other components within normal limits   URINALYSIS - Abnormal; Notable for the following components:    Urobilinogen, UA 1+ (*)     Squamous Epithelial Cells, UA Trace (*)     Mucous, UA Trace (*)     All other components within normal limits   DRUG SCREEN, URINE (BEAKER) - Abnormal; Notable for the following components:    Cannabinoids, Urine Positive (*)     All other components within normal limits    Narrative:     Cut off concentrations:    Amphetamines - 1000 ng/ml  Barbiturates - 200 ng/ml  Benzodiazepine - 200 ng/ml  Cannabinoids (THC) - 50 ng/ml  Cocaine - 300 ng/ml  Fentanyl - 1.0 ng/ml  MDMA - 500 ng/ml  Opiates - 300 ng/ml   Phencyclidine (PCP) - 25 ng/ml    Specimen submitted for drug analysis and tested for pH and specific gravity in order to evaluate sample integrity. Suspect tampering if specific gravity is <1.003 and/or pH is not within the range of 4.5 - 8.0  False negatives may result form substances such as bleach added to urine.  False positives may result for the presence of a substance with similar chemical structure to the drug or its metabolite.    This test provides only a PRELIMINARY analytical test result. A more specific alternate chemical method must be used in order to obtain a confirmed analytical result. Gas chromatography/mass spectrometry (GC/MS) is the preferred confirmatory  method. Other chemical confirmation methods are available. Clinical consideration and professional judgement should be applied to any drug of abuse test result, particularly when preliminary positive results are used.    Positive results will be confirmed only at the physicians request. Unconfirmed screening results are to be used only for medical purposes (treatment).        BILIRUBIN, DIRECT - Abnormal; Notable for the following components:    Bilirubin Direct 1.1 (*)     All other components within normal limits   POCT GLUCOSE - Abnormal; Notable for the following components:    POCT Glucose 146 (*)     All other components within normal limits   AMMONIA - Normal   MAGNESIUM - Normal   ALCOHOL,MEDICAL (ETHANOL) - Normal   COVID/FLU A&B PCR - Normal   LIPASE - Normal   HEPATITIS PANEL, ACUTE - Normal   LACTIC ACID, PLASMA - Normal   CBC W/ AUTO DIFFERENTIAL    Narrative:     The following orders were created for panel order CBC auto differential.  Procedure                               Abnormality         Status                     ---------                               -----------         ------                     CBC with Differential[[id number]]        Abnormal            Final result                 Please view results for these tests on the individual orders.   POCT GLUCOSE MONITORING CONTINUOUS          Imaging Results              CT Abdomen Pelvis  Without Contrast (Final result)  Result time 09/12/22 22:06:12      Final result by Reza Mariano MD (09/12/22 22:06:12)                   Impression:      Pneumobilia.    Hiatal hernia.      Electronically signed by: Reza Mariano MD  Date:    09/12/2022  Time:    22:06               Narrative:    EXAMINATION:  CT ABDOMEN PELVIS WITHOUT CONTRAST    CLINICAL HISTORY:  Abdominal abscess/infection suspected;Abdominal pain, acute, nonlocalized;    TECHNIQUE:  Low dose axial images, sagittal and coronal reformations were obtained from the lung bases to the pubic  symphysis.  No oral contrast was given.    Automatic exposure control (AEC) was utilized for dose reduction.    Dose: 226 mGycm    COMPARISON:  02/18/2022    FINDINGS:  There is a hiatal hernia present.  There is pneumobilia.  Spleen appears normal.  Pancreas appears normal.  The adrenals are not enlarged.  Kidneys appear normal.  Aorta shows calcification without an aneurysm present no distal ureteral lithiasis is seen there degenerative changes at L5-S1.  The appendix is not identified.    There are diverticulum in the colon without evidence of acute diverticulitis                                       US Abdomen Limited (Final result)  Result time 09/12/22 18:34:42      Final result by Jostin Scott MD (09/12/22 18:34:42)                   Impression:      Gallbladder not visualized.  Borderline dilatation of the common bile duct for a presumably post cholecystectomy patient.  There is underlying pneumobilia, but a 12 mm echogenic area in the distal common bile duct may be choledocholithiasis.  If needed, CT can be considered for further assessment.  The pneumobilia may significantly limit the quality of any MRCP images.      Electronically signed by: Jostin Scott  Date:    09/12/2022  Time:    18:34               Narrative:    EXAMINATION:  US ABDOMEN LIMITED    CLINICAL HISTORY:  hepatitis;    TECHNIQUE:  Gray-scale and color Doppler ultrasound images of the abdomen.    COMPARISON:  Ultrasound 03/04/2022    CT abdomen/pelvis 02/18/2022    FINDINGS:  No pancreatic abnormality appreciated.    Main portal vein patent with normal flow direction.  Normal caliber of the superior IVC.    Cirrhotic liver morphology.  Underlying pneumobilia.  Gallbladder not seen.  Oval echogenic area in the distal portion of the common bile duct near the pancreatic head measures 12 mm with posterior shadowing.  No significant ascites.    No hydronephrosis or defined calcification in the right kidney.    Measurements:    - Liver:  14.4 cm    - CBD diameter: 10 mm    - Right kidney: 10.1 cm in length                                       X-Ray Abdomen AP 1 View (Final result)  Result time 09/12/22 15:09:40      Final result by Syed Crandall MD (09/12/22 15:09:40)                   Impression:      Residual feces.    Nonspecific gas pattern      Electronically signed by: Syed Crandall  Date:    09/12/2022  Time:    15:09               Narrative:    EXAMINATION:  XR ABDOMEN AP 1 VIEW    CLINICAL HISTORY:  abdominal discomfort;    TECHNIQUE:  AP X-RAY OF THE ABDOMEN:    CPT 34888    FINDINGS:  Examination reveals residual feces throughout the colon gas pattern is nonspecific with no clear evidence of ileus or obstruction no abnormal masses or calcifications identified                                       X-Ray Chest 1 View (Final result)  Result time 09/12/22 14:41:09      Final result by Aziza Barry MD (09/12/22 14:41:09)                   Impression:      No acute abnormality of the chest.      Electronically signed by: Aziza Barry  Date:    09/12/2022  Time:    14:41               Narrative:    EXAMINATION:  XR CHEST 1 VIEW    CLINICAL HISTORY:  Unspecified abdominal pain    TECHNIQUE:  AP chest    COMPARISON:  None.    FINDINGS:  The heart is normal in size.  The lungs are clear without focal consolidation.  There is no pleural effusion or visible pneumothorax.                                       Medications   piperacillin-tazobactam (ZOSYN) 4.5 g in sodium chloride 0.9 % 100 mL IVPB (MB+) (4.5 g Intravenous Not Given 9/15/22 1800)   sodium chloride 0.9% flush 10 mL (has no administration in time range)   ibuprofen tablet 600 mg (600 mg Oral Not Given 9/12/22 2345)   pantoprazole injection 40 mg (40 mg Intravenous Given 9/15/22 0927)   iohexoL (OMNIPAQUE 350) 350 mg iodine/mL injection (0 mLs  Hold 9/12/22 2115)   indomethacin 50 mg suppository 100 mg (100 mg Rectal Given 9/13/22 1233)   iopamidoL (ISOVUE-300) 61 %  injection (has no administration in time range)   albuterol nebulizer solution 2.5 mg (has no administration in time range)   enoxaparin injection 40 mg (40 mg Subcutaneous Not Given 9/15/22 1700)   amLODIPine tablet 10 mg (10 mg Oral Given 9/15/22 0941)   hydrALAZINE injection 10 mg (10 mg Intravenous Not Given 9/14/22 1849)   labetalol 20 mg/4 mL (5 mg/mL) IV syring (has no administration in time range)   sodium chloride 0.9% 1,000 mL with mvi, (ADULT) no.4 with vit K 3,300 unit- 150 mcg/10 mL 10 mL, thiamine 100 mg, folic acid 1 mg infusion ( Intravenous Stopped 9/12/22 1611)   ciprofloxacin (CIPRO)400mg/200ml D5W IVPB 400 mg (0 mg Intravenous Stopped 9/12/22 1830)   potassium phosphate 30 mmol in dextrose 5 % 500 mL infusion (0 mmol Intravenous Stopped 9/14/22 0205)   albuterol-ipratropium 2.5 mg-0.5 mg/3 mL nebulizer solution 3 mL (3 mLs Nebulization Given 9/13/22 1407)   potassium chloride SA CR tablet 40 mEq (40 mEq Oral Given 9/15/22 0941)      3:52 PM    At this time pt reports feeling better, more awake and responsive. Admits alcohol use, wine, not daily. Denies problems with liver and still have her gallbladder. Labs with transaminates, hepatitis panel negative. US requested for assessment.            ED Course as of 09/15/22 1810   Mon Sep 12, 2022   1848 X-Ray Abdomen AP 1 View [RK]      ED Course User Index  [RK] Ovi Zayas MD                 Clinical Impression:   Final diagnoses:  [R10.9] Abdominal discomfort  [R74.01] Transaminitis (Primary)  [E86.0] Dehydration, moderate  [R65.10] Systemic inflammatory response syndrome (SIRS)        ED Disposition Condition    Admit Stable                Tj Durham MD  09/12/22 1836       Tj Durham MD  09/15/22 1810

## 2022-09-12 NOTE — Clinical Note
Diagnosis: Transaminitis [216995]   Admitting Provider:: RAFFY NIX [[id number]]   Future Attending Provider: RYAN HA [[id number]]   Reason for IP Medical Treatment  (Clinical interventions that can only be accomplished in the IP setting? ) :: symptom managment, imaging, fluids   Estimated Length of Stay:: 2 midnights   I certify that Inpatient services for greater than or equal to 2 midnights are medically necessary:: Yes   Plans for Post-Acute care--if anticipated (pick the single best option):: A. No post acute care anticipated at this time

## 2022-09-12 NOTE — ED NOTES
Contacted [facility] supervisor, Best, who reports that the EMT will be heading to the ER with the patient's ID shortly; en route from [place].

## 2022-09-12 NOTE — ED PROVIDER NOTES
Patient was signed out to myself pending official ultrasound report.    Right upper quadrant ultrasound shows borderline dilation of the common bile duct 0.63 cm.  There is signs of an underlying pneumobilia with a 12 mm echogenic area in the distal common bile duct that may be concerning for choledocholithiasis.      Patient will be admitted to the hospital for further management.  Discussed with family medicine team.     Ovi Zayas MD  09/12/22 5642

## 2022-09-13 ENCOUNTER — ANESTHESIA EVENT (OUTPATIENT)
Dept: ENDOSCOPY | Facility: HOSPITAL | Age: 62
DRG: 872 | End: 2022-09-13
Payer: MEDICAID

## 2022-09-13 ENCOUNTER — ANESTHESIA (OUTPATIENT)
Dept: ENDOSCOPY | Facility: HOSPITAL | Age: 62
DRG: 872 | End: 2022-09-13
Payer: MEDICAID

## 2022-09-13 LAB
ALBUMIN SERPL-MCNC: 2.9 GM/DL (ref 3.4–4.8)
ALBUMIN/GLOB SERPL: 1 RATIO (ref 1.1–2)
ALP SERPL-CCNC: 155 UNIT/L (ref 40–150)
ALT SERPL-CCNC: 391 UNIT/L (ref 0–55)
APTT PPP: 30.4 SECONDS
AST SERPL-CCNC: 327 UNIT/L (ref 5–34)
BASOPHILS # BLD AUTO: 0.05 X10(3)/MCL (ref 0–0.2)
BASOPHILS NFR BLD AUTO: 0.2 %
BILIRUBIN DIRECT+TOT PNL SERPL-MCNC: 2.7 MG/DL (ref 0–0.5)
BILIRUBIN DIRECT+TOT PNL SERPL-MCNC: 4.1 MG/DL
BUN SERPL-MCNC: 9.2 MG/DL (ref 9.8–20.1)
CALCIUM SERPL-MCNC: 8.5 MG/DL (ref 8.4–10.2)
CHLORIDE SERPL-SCNC: 109 MMOL/L (ref 98–107)
CO2 SERPL-SCNC: 23 MMOL/L (ref 23–31)
CREAT SERPL-MCNC: 0.6 MG/DL (ref 0.55–1.02)
EOSINOPHIL # BLD AUTO: 0.19 X10(3)/MCL (ref 0–0.9)
EOSINOPHIL NFR BLD AUTO: 0.9 %
ERYTHROCYTE [DISTWIDTH] IN BLOOD BY AUTOMATED COUNT: 14.4 % (ref 11.5–17)
GFR SERPLBLD CREATININE-BSD FMLA CKD-EPI: >60 MLS/MIN/1.73/M2
GGT SERPL-CCNC: 221 U/L (ref 9–36)
GLOBULIN SER-MCNC: 3 GM/DL (ref 2.4–3.5)
GLUCOSE SERPL-MCNC: 81 MG/DL (ref 82–115)
HCT VFR BLD AUTO: 34.5 % (ref 37–47)
HGB BLD-MCNC: 11.3 GM/DL (ref 12–16)
IMM GRANULOCYTES # BLD AUTO: 0.07 X10(3)/MCL (ref 0–0.04)
IMM GRANULOCYTES NFR BLD AUTO: 0.3 %
LYMPHOCYTES # BLD AUTO: 1.32 X10(3)/MCL (ref 0.6–4.6)
LYMPHOCYTES NFR BLD AUTO: 6.5 %
MAGNESIUM SERPL-MCNC: 1.8 MG/DL (ref 1.6–2.6)
MCH RBC QN AUTO: 29.7 PG (ref 27–31)
MCHC RBC AUTO-ENTMCNC: 32.8 MG/DL (ref 33–36)
MCV RBC AUTO: 90.6 FL (ref 80–94)
MONOCYTES # BLD AUTO: 1.04 X10(3)/MCL (ref 0.1–1.3)
MONOCYTES NFR BLD AUTO: 5.1 %
NEUTROPHILS # BLD AUTO: 17.6 X10(3)/MCL (ref 2.1–9.2)
NEUTROPHILS NFR BLD AUTO: 87 %
NRBC BLD AUTO-RTO: 0 %
PATH REV: NORMAL
PHOSPHATE SERPL-MCNC: 2 MG/DL (ref 2.3–4.7)
PLATELET # BLD AUTO: 260 X10(3)/MCL (ref 130–400)
PMV BLD AUTO: 10.3 FL (ref 7.4–10.4)
POTASSIUM SERPL-SCNC: 3.5 MMOL/L (ref 3.5–5.1)
PROT SERPL-MCNC: 5.9 GM/DL (ref 5.8–7.6)
RBC # BLD AUTO: 3.81 X10(6)/MCL (ref 4.2–5.4)
SODIUM SERPL-SCNC: 141 MMOL/L (ref 136–145)
WBC # SPEC AUTO: 20.3 X10(3)/MCL (ref 4.5–11.5)

## 2022-09-13 PROCEDURE — 99900035 HC TECH TIME PER 15 MIN (STAT)

## 2022-09-13 PROCEDURE — 63600175 PHARM REV CODE 636 W HCPCS: Performed by: STUDENT IN AN ORGANIZED HEALTH CARE EDUCATION/TRAINING PROGRAM

## 2022-09-13 PROCEDURE — 82977 ASSAY OF GGT: CPT | Performed by: STUDENT IN AN ORGANIZED HEALTH CARE EDUCATION/TRAINING PROGRAM

## 2022-09-13 PROCEDURE — 21400001 HC TELEMETRY ROOM

## 2022-09-13 PROCEDURE — 85610 PROTHROMBIN TIME: CPT | Performed by: STUDENT IN AN ORGANIZED HEALTH CARE EDUCATION/TRAINING PROGRAM

## 2022-09-13 PROCEDURE — 43274 ERCP DUCT STENT PLACEMENT: CPT | Performed by: INTERNAL MEDICINE

## 2022-09-13 PROCEDURE — 83735 ASSAY OF MAGNESIUM: CPT | Performed by: STUDENT IN AN ORGANIZED HEALTH CARE EDUCATION/TRAINING PROGRAM

## 2022-09-13 PROCEDURE — 85025 COMPLETE CBC W/AUTO DIFF WBC: CPT | Performed by: STUDENT IN AN ORGANIZED HEALTH CARE EDUCATION/TRAINING PROGRAM

## 2022-09-13 PROCEDURE — 43264 ERCP REMOVE DUCT CALCULI: CPT | Performed by: INTERNAL MEDICINE

## 2022-09-13 PROCEDURE — 37000009 HC ANESTHESIA EA ADD 15 MINS: Performed by: INTERNAL MEDICINE

## 2022-09-13 PROCEDURE — C1769 GUIDE WIRE: HCPCS | Performed by: INTERNAL MEDICINE

## 2022-09-13 PROCEDURE — 25000003 PHARM REV CODE 250: Performed by: NURSE ANESTHETIST, CERTIFIED REGISTERED

## 2022-09-13 PROCEDURE — 25000003 PHARM REV CODE 250: Performed by: STUDENT IN AN ORGANIZED HEALTH CARE EDUCATION/TRAINING PROGRAM

## 2022-09-13 PROCEDURE — C2625 STENT, NON-COR, TEM W/DEL SY: HCPCS | Performed by: INTERNAL MEDICINE

## 2022-09-13 PROCEDURE — 85730 THROMBOPLASTIN TIME PARTIAL: CPT | Performed by: STUDENT IN AN ORGANIZED HEALTH CARE EDUCATION/TRAINING PROGRAM

## 2022-09-13 PROCEDURE — 27201423 OPTIME MED/SURG SUP & DEVICES STERILE SUPPLY: Performed by: INTERNAL MEDICINE

## 2022-09-13 PROCEDURE — 36415 COLL VENOUS BLD VENIPUNCTURE: CPT | Performed by: STUDENT IN AN ORGANIZED HEALTH CARE EDUCATION/TRAINING PROGRAM

## 2022-09-13 PROCEDURE — 25000003 PHARM REV CODE 250

## 2022-09-13 PROCEDURE — 37000008 HC ANESTHESIA 1ST 15 MINUTES: Performed by: INTERNAL MEDICINE

## 2022-09-13 PROCEDURE — 80053 COMPREHEN METABOLIC PANEL: CPT | Performed by: STUDENT IN AN ORGANIZED HEALTH CARE EDUCATION/TRAINING PROGRAM

## 2022-09-13 PROCEDURE — 84100 ASSAY OF PHOSPHORUS: CPT | Performed by: STUDENT IN AN ORGANIZED HEALTH CARE EDUCATION/TRAINING PROGRAM

## 2022-09-13 PROCEDURE — 63600175 PHARM REV CODE 636 W HCPCS: Performed by: NURSE ANESTHETIST, CERTIFIED REGISTERED

## 2022-09-13 PROCEDURE — 94761 N-INVAS EAR/PLS OXIMETRY MLT: CPT

## 2022-09-13 PROCEDURE — 25000003 PHARM REV CODE 250: Performed by: INTERNAL MEDICINE

## 2022-09-13 PROCEDURE — 25000242 PHARM REV CODE 250 ALT 637 W/ HCPCS: Performed by: ANESTHESIOLOGY

## 2022-09-13 PROCEDURE — 82248 BILIRUBIN DIRECT: CPT | Performed by: STUDENT IN AN ORGANIZED HEALTH CARE EDUCATION/TRAINING PROGRAM

## 2022-09-13 DEVICE — STENT ADVANIX BILIARY 10X5: Type: IMPLANTABLE DEVICE | Site: BILE DUCT | Status: FUNCTIONAL

## 2022-09-13 RX ORDER — ONDANSETRON 2 MG/ML
4 INJECTION INTRAMUSCULAR; INTRAVENOUS DAILY PRN
Status: DISCONTINUED | OUTPATIENT
Start: 2022-09-13 | End: 2022-09-13

## 2022-09-13 RX ORDER — MORPHINE SULFATE 2 MG/ML
2 INJECTION, SOLUTION INTRAMUSCULAR; INTRAVENOUS EVERY 5 MIN PRN
Status: DISCONTINUED | OUTPATIENT
Start: 2022-09-13 | End: 2022-09-13

## 2022-09-13 RX ORDER — MEPERIDINE HYDROCHLORIDE 25 MG/ML
12.5 INJECTION INTRAMUSCULAR; INTRAVENOUS; SUBCUTANEOUS EVERY 10 MIN PRN
Status: DISCONTINUED | OUTPATIENT
Start: 2022-09-13 | End: 2022-09-13

## 2022-09-13 RX ORDER — IPRATROPIUM BROMIDE AND ALBUTEROL SULFATE 2.5; .5 MG/3ML; MG/3ML
3 SOLUTION RESPIRATORY (INHALATION) ONCE
Status: COMPLETED | OUTPATIENT
Start: 2022-09-13 | End: 2022-09-13

## 2022-09-13 RX ORDER — DEXAMETHASONE SODIUM PHOSPHATE 4 MG/ML
INJECTION, SOLUTION INTRA-ARTICULAR; INTRALESIONAL; INTRAMUSCULAR; INTRAVENOUS; SOFT TISSUE
Status: DISCONTINUED | OUTPATIENT
Start: 2022-09-13 | End: 2022-09-13

## 2022-09-13 RX ORDER — OXYCODONE HYDROCHLORIDE 5 MG/1
5 TABLET ORAL
Status: DISCONTINUED | OUTPATIENT
Start: 2022-09-13 | End: 2022-09-13

## 2022-09-13 RX ORDER — NEOSTIGMINE METHYLSULFATE 1 MG/ML
INJECTION, SOLUTION INTRAVENOUS
Status: DISCONTINUED | OUTPATIENT
Start: 2022-09-13 | End: 2022-09-13

## 2022-09-13 RX ORDER — INDOMETHACIN 50 MG/1
100 SUPPOSITORY RECTAL
Status: DISCONTINUED | OUTPATIENT
Start: 2022-09-13 | End: 2022-09-18 | Stop reason: HOSPADM

## 2022-09-13 RX ORDER — LIDOCAINE HYDROCHLORIDE 20 MG/ML
INJECTION INTRAVENOUS
Status: DISCONTINUED | OUTPATIENT
Start: 2022-09-13 | End: 2022-09-13

## 2022-09-13 RX ORDER — ROCURONIUM BROMIDE 10 MG/ML
INJECTION, SOLUTION INTRAVENOUS
Status: DISCONTINUED | OUTPATIENT
Start: 2022-09-13 | End: 2022-09-13

## 2022-09-13 RX ORDER — PROPOFOL 10 MG/ML
VIAL (ML) INTRAVENOUS
Status: DISCONTINUED | OUTPATIENT
Start: 2022-09-13 | End: 2022-09-13

## 2022-09-13 RX ORDER — ENOXAPARIN SODIUM 100 MG/ML
40 INJECTION SUBCUTANEOUS EVERY 24 HOURS
Status: DISCONTINUED | OUTPATIENT
Start: 2022-09-13 | End: 2022-09-18 | Stop reason: HOSPADM

## 2022-09-13 RX ORDER — ONDANSETRON 2 MG/ML
INJECTION INTRAMUSCULAR; INTRAVENOUS
Status: DISCONTINUED | OUTPATIENT
Start: 2022-09-13 | End: 2022-09-13

## 2022-09-13 RX ORDER — ALBUTEROL SULFATE 90 UG/1
2 AEROSOL, METERED RESPIRATORY (INHALATION) EVERY 4 HOURS PRN
Status: DISCONTINUED | OUTPATIENT
Start: 2022-09-13 | End: 2022-09-13

## 2022-09-13 RX ORDER — FENTANYL CITRATE 50 UG/ML
INJECTION, SOLUTION INTRAMUSCULAR; INTRAVENOUS
Status: DISCONTINUED | OUTPATIENT
Start: 2022-09-13 | End: 2022-09-13

## 2022-09-13 RX ORDER — ALBUTEROL SULFATE 0.83 MG/ML
2.5 SOLUTION RESPIRATORY (INHALATION) EVERY 4 HOURS PRN
Status: DISCONTINUED | OUTPATIENT
Start: 2022-09-13 | End: 2022-09-18 | Stop reason: HOSPADM

## 2022-09-13 RX ADMIN — PIPERACILLIN AND TAZOBACTAM 4.5 G: 4; .5 INJECTION, POWDER, LYOPHILIZED, FOR SOLUTION INTRAVENOUS; PARENTERAL at 04:09

## 2022-09-13 RX ADMIN — FENTANYL CITRATE 100 MCG: 50 INJECTION INTRAMUSCULAR; INTRAVENOUS at 12:09

## 2022-09-13 RX ADMIN — IPRATROPIUM BROMIDE AND ALBUTEROL SULFATE 3 ML: 2.5; .5 SOLUTION RESPIRATORY (INHALATION) at 02:09

## 2022-09-13 RX ADMIN — DEXAMETHASONE SODIUM PHOSPHATE 4 MG: 4 INJECTION INTRA-ARTICULAR; INTRALESIONAL; INTRAMUSCULAR; INTRAVENOUS; SOFT TISSUE at 12:09

## 2022-09-13 RX ADMIN — ONDANSETRON 4 MG: 2 INJECTION INTRAMUSCULAR; INTRAVENOUS at 12:09

## 2022-09-13 RX ADMIN — NEOSTIGMINE METHYLSULFATE 5 MG: 1 INJECTION INTRAVENOUS at 01:09

## 2022-09-13 RX ADMIN — ROCURONIUM BROMIDE 35 MG: 10 SOLUTION INTRAVENOUS at 12:09

## 2022-09-13 RX ADMIN — PIPERACILLIN AND TAZOBACTAM 4.5 G: 4; .5 INJECTION, POWDER, LYOPHILIZED, FOR SOLUTION INTRAVENOUS; PARENTERAL at 05:09

## 2022-09-13 RX ADMIN — SODIUM CHLORIDE, POTASSIUM CHLORIDE, SODIUM LACTATE AND CALCIUM CHLORIDE: 600; 310; 30; 20 INJECTION, SOLUTION INTRAVENOUS at 09:09

## 2022-09-13 RX ADMIN — LIDOCAINE HYDROCHLORIDE 40 MG: 20 INJECTION, SOLUTION INTRAVENOUS at 12:09

## 2022-09-13 RX ADMIN — PROPOFOL 150 MG: 10 INJECTION, EMULSION INTRAVENOUS at 12:09

## 2022-09-13 RX ADMIN — ENOXAPARIN SODIUM 40 MG: 40 INJECTION SUBCUTANEOUS at 08:09

## 2022-09-13 RX ADMIN — HYDROCHLOROTHIAZIDE 12.5 MG: 12.5 TABLET ORAL at 09:09

## 2022-09-13 RX ADMIN — POTASSIUM PHOSPHATE, MONOBASIC AND POTASSIUM PHOSPHATE, DIBASIC 30 MMOL: 224; 236 INJECTION, SOLUTION, CONCENTRATE INTRAVENOUS at 08:09

## 2022-09-13 NOTE — MEDICAL/APP STUDENT
"[facility] Progress Note     Resident Team: [facility]  Attending Physician: Steve Beach, *  Resident: Dr. Maureen Escobedo       Subjective:      Brief HPI:  Pt with PMH of GERD, hiatal hernia, rectal mass, asthma, hypertension, sciatica presented to ED with abdominal pain    Interval History:   NAEON. This morning, pt states that she feels better. Reports headache and left shoulder pain. Pt says that she doesn't remember if left shoulder pain started before or after the abdominal pain episode. Left shoulder pain only happens when she moves her left arm, no left shoulder pain when left arm is at rest. No recent falls, abnormal movements, or lifting heavy objects. Pt had a non-bloody, watery bowel movement overnight. Has mild abdominal pain and N/V. Voiding spontaneously. Hasn't walked around the room. Denies fever, chills, chest pain, dizziness/lightheadedness, SOB, myalgias, vision changes, dysuria/hematuria, dyschezia/hematochezia, hematemesis.     Review of Systems:  ROS as indicated above.      Objective:     Last 24 Hour Vital Signs:  BP  Min: 131/73  Max: 187/76  Temp  Av.1 °F (36.7 °C)  Min: 97.7 °F (36.5 °C)  Max: 98.3 °F (36.8 °C)  Pulse  Av.5  Min: 85  Max: 111  Resp  Av  Min: 18  Max: 22  SpO2  Av.5 %  Min: 95 %  Max: 99 %  Height  Av' 6" (167.6 cm)  Min: 5' 6" (167.6 cm)  Max: 5' 6" (167.6 cm)  Weight  Av.1 kg (119 lb 4.3 oz)  Min: 54.1 kg (119 lb 4.3 oz)  Max: 54.1 kg (119 lb 4.3 oz)  No intake/output data recorded.    Physical Examination:  Gen: In bed. Alert. Not in acute distress.   HEENT: PEERLA. EOMI. No thyromegaly.   Neck: Supple. Full ROM. No lymphadenopathy.  Heart: Regular rate and rhythm. No murmurs, rubs, gallops appreciated   Lungs: Clear to auscultation. Non labored breathing. Chest rise b/l and symmetric.   Abd: Tenderness to palpation in mid-epigastric and LUQ region. Non-distended. Normoactive bowel sounds. No guarding or " rebound tenderness.   Extremities/MSK: Able to move extremities purposefully. 5/5 muscle strength   Neuro: Oriented to person, place, time. CN II-XII grossly intact. No gross deficits appreciated.   Skin: Warm and dry. Well perfused. No jaundice.     Laboratory:  Most Recent Data:  CBC:   Lab Results   Component Value Date    WBC 20.3 (H) 09/13/2022    HGB 11.3 (L) 09/13/2022    HCT 34.5 (L) 09/13/2022     09/13/2022    MCV 90.6 09/13/2022    RDW 14.4 09/13/2022     WBC Differential:   Recent Labs   Lab 09/12/22  1332 09/13/22  0400   WBC 20.7* 20.3*   HGB 12.6 11.3*   HCT 40.2 34.5*    260   MCV 93.9 90.6     BMP:   Lab Results   Component Value Date     09/13/2022    K 3.5 09/13/2022    CO2 23 09/13/2022    BUN 9.2 (L) 09/13/2022    CREATININE 0.60 09/13/2022    CALCIUM 8.5 09/13/2022    MG 1.80 09/12/2022    PHOS 1.4 (L) 09/12/2022     LFTs:   Lab Results   Component Value Date    ALBUMIN 2.9 (L) 09/13/2022    BILITOT 4.1 (H) 09/13/2022     (H) 09/13/2022    ALKPHOS 155 (H) 09/13/2022     (H) 09/13/2022     Coags:   Lab Results   Component Value Date    INR 1.09 09/13/2022    PROTIME 13.9 09/13/2022    PTT 30.4 09/13/2022     FLP:   Lab Results   Component Value Date    CHOL 240 (H) 08/24/2021    HDL 47 08/24/2021    TRIG 168 (H) 08/24/2021     DM:   Lab Results   Component Value Date    CREATININE 0.60 09/13/2022     Thyroid:   Lab Results   Component Value Date    TSH 0.1326 (L) 08/24/2021      Anemia: No results found for: IRON, TIBC, FERRITIN, SATURATEDIRO  No results found for: RPYDYARE11  No results found for: FOLATE     Cardiac: No results found for: TROPONINI, CKTOTAL, CKMB, BNP      Microbiology Data:  Microbiology Results (last 7 days)       Procedure Component Value Units Date/Time    Blood Culture #1 **CANNOT BE ORDERED STAT** [[id number]] Collected: 09/12/22 1640    Order Status: Resulted Specimen: Blood Updated: 09/12/22 1640    Blood Culture #2 **CANNOT BE ORDERED  STAT** [[id number]] Collected: 09/12/22 1541    Order Status: Resulted Specimen: Blood Updated: 09/12/22 1541             Other Results:  EKG (my interpretation): not indicated    Radiology:  Imaging Results              CT Abdomen Pelvis  Without Contrast (Final result)  Result time 09/12/22 22:06:12      Final result by Reza Mariano MD (09/12/22 22:06:12)                   Impression:      Pneumobilia.    Hiatal hernia.      Electronically signed by: Reza Mariano MD  Date:    09/12/2022  Time:    22:06               Narrative:    EXAMINATION:  CT ABDOMEN PELVIS WITHOUT CONTRAST    CLINICAL HISTORY:  Abdominal abscess/infection suspected;Abdominal pain, acute, nonlocalized;    TECHNIQUE:  Low dose axial images, sagittal and coronal reformations were obtained from the lung bases to the pubic symphysis.  No oral contrast was given.    Automatic exposure control (AEC) was utilized for dose reduction.    Dose: 226 mGycm    COMPARISON:  02/18/2022    FINDINGS:  There is a hiatal hernia present.  There is pneumobilia.  Spleen appears normal.  Pancreas appears normal.  The adrenals are not enlarged.  Kidneys appear normal.  Aorta shows calcification without an aneurysm present no distal ureteral lithiasis is seen there degenerative changes at L5-S1.  The appendix is not identified.    There are diverticulum in the colon without evidence of acute diverticulitis                                       US Abdomen Limited (Final result)  Result time 09/12/22 18:34:42      Final result by Jostin Scott MD (09/12/22 18:34:42)                   Impression:      Gallbladder not visualized.  Borderline dilatation of the common bile duct for a presumably post cholecystectomy patient.  There is underlying pneumobilia, but a 12 mm echogenic area in the distal common bile duct may be choledocholithiasis.  If needed, CT can be considered for further assessment.  The pneumobilia may significantly limit the quality of any MRCP  images.      Electronically signed by: Jostinrt Scott  Date:    09/12/2022  Time:    18:34               Narrative:    EXAMINATION:  US ABDOMEN LIMITED    CLINICAL HISTORY:  hepatitis;    TECHNIQUE:  Gray-scale and color Doppler ultrasound images of the abdomen.    COMPARISON:  Ultrasound 03/04/2022    CT abdomen/pelvis 02/18/2022    FINDINGS:  No pancreatic abnormality appreciated.    Main portal vein patent with normal flow direction.  Normal caliber of the superior IVC.    Cirrhotic liver morphology.  Underlying pneumobilia.  Gallbladder not seen.  Oval echogenic area in the distal portion of the common bile duct near the pancreatic head measures 12 mm with posterior shadowing.  No significant ascites.    No hydronephrosis or defined calcification in the right kidney.    Measurements:    - Liver: 14.4 cm    - CBD diameter: 10 mm    - Right kidney: 10.1 cm in length                                       X-Ray Abdomen AP 1 View (Final result)  Result time 09/12/22 15:09:40      Final result by Syed Crandall MD (09/12/22 15:09:40)                   Impression:      Residual feces.    Nonspecific gas pattern      Electronically signed by: Syed Crandall  Date:    09/12/2022  Time:    15:09               Narrative:    EXAMINATION:  XR ABDOMEN AP 1 VIEW    CLINICAL HISTORY:  abdominal discomfort;    TECHNIQUE:  AP X-RAY OF THE ABDOMEN:    CPT 94449    FINDINGS:  Examination reveals residual feces throughout the colon gas pattern is nonspecific with no clear evidence of ileus or obstruction no abnormal masses or calcifications identified                                       X-Ray Chest 1 View (Final result)  Result time 09/12/22 14:41:09      Final result by Aziza Barry MD (09/12/22 14:41:09)                   Impression:      No acute abnormality of the chest.      Electronically signed by: Aziza Barry  Date:    09/12/2022  Time:    14:41               Narrative:    EXAMINATION:  XR CHEST 1  VIEW    CLINICAL HISTORY:  Unspecified abdominal pain    TECHNIQUE:  AP chest    COMPARISON:  None.    FINDINGS:  The heart is normal in size.  The lungs are clear without focal consolidation.  There is no pleural effusion or visible pneumothorax.                                      Current Medications:     Infusions:   lactated ringers 100 mL/hr at 09/12/22 2200        Scheduled:   hydroCHLOROthiazide  12.5 mg Oral Daily    iohexoL        pantoprozole (PROTONIX) IV  40 mg Intravenous Daily    piperacillin-tazobactam (ZOSYN) IVPB  4.5 g Intravenous Q8H        PRN:  ibuprofen, sodium chloride 0.9%    Antibiotics and Day Number of Therapy:  Ciprofloxacin 400mg, 200mL/hr, Day 1     Lines and Day Number of Therapy:  PIV, Day 1     Assessment & Plan:     CBD Dilation to 10 mm w/ 12 mm Echogenic Focus w/in Distal CBD   Pneumobilia, Chronic   Elevated Bilirubin   Elevated AST and ALT   Elevated ALK Phos and GGT  -APTT, PT, INR WNL. Mg WNL.   -Pending direct bilirubin levels   -GI consulted. Will f/u recs. NPO, pt is getting ERCP. Pending results  -Pt declines ever having cholecystectomy, though gall bladder rarely visualized on oupt imaging.   -Continue IV Zosyn in setting of leukocytosis.   -PRN Pain Control      Sepsis (SIRS III/IV though transient) w/ suspected intraabdominal source   -Leukocytosis Persists this AM, stable. Continue IV Zosyn as noted above. PT w/o clinical decline otherwise.    -Will f/u Blood Cultures, no results currently.      Hypophosphatemia:   1.4 on admit and 2.0 this AM, needs repletion post ERCP.      Rectal Mass   -Pt declines intervention. CT Abd/Pelvis done w/o contrast 2/2 iodine allergy and rectal exam deferred. No signs currently that rectal mass is direct cause of acute presentation.      Hiatal Hernia   GERD   -Continue Protonix while inpt, pt takes PPI at home.   -Continue oupt follow up as scheduled.      Sciatica:   Holding home Lyrica, it is prescribed PRN and pt unable to list  home medications.      Osteoporosis:   Holding home Alendronate at this time.      Tobacco Use:   Consider Nicotine patch while inpt if pt amenable.      Asthma:   PRN Albuterol for wheezing/SOB     CODE STATUS: DNI   Access: PIV  Antibiotics: IV Zosyn   Diet: NPO   DVT Prophylaxis: Lovenox 40mg qd  GI Prophylaxis: Home PPI   Fluids: LR 100mL/hr     Disposition: Pending course    Nano Lind, MS4 [facility]

## 2022-09-13 NOTE — HPI
Pt with PMH of GERD, hiatal hernia, rectal mass presented to ED with abdominal pain. Reports the pain woke her up 0300 on 9/12 due to severe epigastric and LUQ pain that she attributes to her hiatal hernia. The pain was intense initially then decreased throughout the morning and remains mild at admission. No alleviating factors. Does not worsen with food intake or bowel movements. Associated symptoms include nausea and one episode of vomiting. Denies fever, chills, CP, SOB, diarrhea, constipation. She denies alcohol abuse, stating occasional wine every 6mo, last etoh intake was 3 days ago. In ED, etoh negative, UDS positive for cannabinoids. CBC significant for elevated WBC at 20.7k. Hepatitis panel negative. On U/S, gallbladder not visualized, with concern for choledocholithiasis. Pt is unsure if she has a gallbladder. She has hx of abdominal surgery with total hysterectomy b/l SBO for adnexal mass as well as an appendectomy. She has been evaluated by surgery on multiple occasions and declines intervention for her rectal mass since 03/2022. She has previous biopsy of unknown date showing high grade dysplasia of rectal mass as reported on MRI of rectal cancer on 3/17/2022.

## 2022-09-13 NOTE — ANESTHESIA PREPROCEDURE EVALUATION
09/13/2022  Lore Stallings is a 62 y.o., female with PMHx of HTN, HLD, asthma, smoking, GERD, rectal CA, depression presents for ERCP secondary to choledocholithiasis.    COVID VACCINE x 1 (last dose 4/9/21; J&J)    NO BETA BLOCKER USE    Active Ambulatory Problems     Diagnosis Date Noted    Pelvic mass 12/16/2019    Transaminitis 06/28/2022    Low back pain 06/28/2022    Depression 06/28/2022    Lumbar degenerative disc disease 06/28/2022    Spondylosis 06/28/2022    Other chronic pain 06/28/2022    Gastroesophageal reflux disease 06/28/2022    Encounter for smoking cessation counseling 06/28/2022    Hiatal hernia 06/28/2022    Elevated blood pressure reading 06/28/2022    Smoking 07/29/2022    Sciatica 07/29/2022    Primary hypertension 07/29/2022    Hyperthyroidism 07/29/2022    Nausea 07/29/2022    Hypertension 09/05/2022    Hyperlipidemia 09/05/2022    Contusion of bone 09/06/2022     Resolved Ambulatory Problems     Diagnosis Date Noted    No Resolved Ambulatory Problems     Past Medical History:   Diagnosis Date    Asthma     GERD (gastroesophageal reflux disease)     Lumbago          Pre-op Assessment    I have reviewed the NPO Status.      Review of Systems  Anesthesia Hx:  No problems with previous Anesthesia    Social:  Smoker    Cardiovascular:   Hypertension, well controlled hyperlipidemia    Pulmonary:   Asthma mild    Hepatic/GI:   Hiatal Hernia, GERD, poorly controlled    Psych:   depression        Vitals:    09/13/22 0407 09/13/22 0729 09/13/22 0747 09/13/22 0907   BP: 131/73 130/73  130/73   Pulse: 93 93     Resp: 18      Temp: 36.8 °C (98.3 °F) 36.8 °C (98.2 °F)     TempSrc: Oral Oral     SpO2: 95% 96% 96%    Weight:       Height:             Physical Exam  General: Alert, Cooperative and Well nourished    Airway:  Mallampati: II   Mouth Opening: Normal  TM  Distance: Normal  Tongue: Normal  Neck ROM: Normal ROM    Dental:  Intact    Chest/Lungs:  Clear to auscultation, Normal Respiratory Rate    Heart:  Rate: Normal  Rhythm: Regular Rhythm  Sounds: Normal      Lab Results   Component Value Date    WBC 20.3 (H) 09/13/2022    HGB 11.3 (L) 09/13/2022    HCT 34.5 (L) 09/13/2022    MCV 90.6 09/13/2022     09/13/2022       CMP  Sodium Level   Date Value Ref Range Status   09/13/2022 141 136 - 145 mmol/L Final     Potassium Level   Date Value Ref Range Status   09/13/2022 3.5 3.5 - 5.1 mmol/L Final     Carbon Dioxide   Date Value Ref Range Status   09/13/2022 23 23 - 31 mmol/L Final     Blood Urea Nitrogen   Date Value Ref Range Status   09/13/2022 9.2 (L) 9.8 - 20.1 mg/dL Final     Creatinine   Date Value Ref Range Status   09/13/2022 0.60 0.55 - 1.02 mg/dL Final     Calcium Level Total   Date Value Ref Range Status   09/13/2022 8.5 8.4 - 10.2 mg/dL Final     Albumin Level   Date Value Ref Range Status   09/13/2022 2.9 (L) 3.4 - 4.8 gm/dL Final     Bilirubin Total   Date Value Ref Range Status   09/13/2022 4.1 (H) <=1.5 mg/dL Final     Alkaline Phosphatase   Date Value Ref Range Status   09/13/2022 155 (H) 40 - 150 unit/L Final     Aspartate Aminotransferase   Date Value Ref Range Status   09/13/2022 327 (H) 5 - 34 unit/L Final     Alanine Aminotransferase   Date Value Ref Range Status   09/13/2022 391 (H) 0 - 55 unit/L Final         Anesthesia Plan  Type of Anesthesia, risks & benefits discussed:    Anesthesia Type: Gen ETT  Intra-op Monitoring Plan: Standard ASA Monitors  Post Op Pain Control Plan: IV/PO Opioids PRN  Induction:  IV  Airway Plan: Direct  Informed Consent: Informed consent signed with the Patient and all parties understand the risks and agree with anesthesia plan.  All questions answered.   ASA Score: 3  Day of Surgery Review of History & Physical: H&P Update referred to the surgeon/provider.    Ready For Surgery From Anesthesia Perspective.      .

## 2022-09-13 NOTE — [employer] PATIENT INSTRUCTIONS
Discharge Summary/Instructions after an Endoscopic Procedure  Patient Name: Lore Stallings  Patient MRN: [id number]  Patient YOB: 1960 Tuesday, September 13, 2022  Iris Sandy MD  Dear patient,  As a result of recent federal legislation (The Federal Cures Act), you may   receive lab or pathology results from your procedure in your [facility]   account before your physician is able to contact you. Your physician or   their representative will relay the results to you with their   recommendations at their soonest availability.  Thank you,  RESTRICTIONS:  During your procedure today, you received medications for sedation.  These   medications may affect your judgment, balance and coordination.  Therefore,   for 24 hours, you have the following restrictions:   - DO NOT drive a car, operate machinery, make legal/financial decisions,   sign important papers or drink alcohol.    ACTIVITY:  Today: no heavy lifting, straining or running due to procedural   sedation/anesthesia.  The following day: return to full activity including work.  DIET:  Eat and drink normally unless instructed otherwise.     TREATMENT FOR COMMON SIDE EFFECTS:  - Mild abdominal pain, nausea, belching, bloating or excessive gas:  rest,   eat lightly and use a heating pad.  - Sore Throat: treat with throat lozenges and/or gargle with warm salt   water.  - Because air was used during the procedure, expelling large amounts of air   from your rectum or belching is normal.  - If a bowel prep was taken, you may not have a bowel movement for 1-3 days.    This is normal.  SYMPTOMS TO WATCH FOR AND REPORT TO YOUR PHYSICIAN:  1. Abdominal pain or bloating, other than gas cramps.  2. Chest pain.  3. Back pain.  4. Signs of infection such as: chills or fever occurring within 24 hours   after the procedure.  5. Rectal bleeding, which would show as bright red, maroon, or black stools.   (A tablespoon of blood from the rectum is not serious,  especially if   hemorrhoids are present.)  6. Vomiting.  7. Weakness or dizziness.  GO DIRECTLY TO THE NEAREST EMERGENCY ROOM IF YOU HAVE ANY OF THE FOLLOWING:      Difficulty breathing              Chills and/or fever over 101 F   Persistent vomiting and/or vomiting blood   Severe abdominal pain   Severe chest pain   Black, tarry stools   Bleeding- more than one tablespoon   Any other symptom or condition that you feel may need urgent attention  Your doctor recommends these additional instructions:  If any biopsies were taken, your doctors clinic will contact you in 1 to 2   weeks with any results.  - [facility] to [facility].   - Advance diet as tolerated.   - Zosyn (piperacillin tazobactam) 3.375 gm IV for total of 5 days of   antibiotic therapy.   - Continue present medications.   - Observe patient's clinical course following today's ERCP with therapeutic   intervention.   - Watch for pancreatitis, bleeding, perforation, and cholangitis.   - Repeat ERCP in 4-6 weeks to remove stent and clear duct   - Consult surgery for cholecystectomy  For questions, problems or results please call your physician - Iris Sandy MD at Work:  [phone].  [facility] , EMERGENCY ROOM PHONE NUMBER: [phone]  IF A COMPLICATION OR EMERGENCY SITUATION ARISES AND YOU ARE UNABLE TO REACH   YOUR PHYSICIAN - GO DIRECTLY TO THE EMERGENCY ROOM.  MD Iris Arauz MD  9/13/2022 4:58:39 PM  This report has been verified and signed electronically.  Dear patient,  As a result of recent federal legislation (The Federal Cures Act), you may   receive lab or pathology results from your procedure in your [facility]   account before your physician is able to contact you. Your physician or   their representative will relay the results to you with their   recommendations at their soonest availability.  Thank you,  PROVATION

## 2022-09-13 NOTE — CONSULTS
General Surgery  Consult Note    SUBJECTIVE:     Chief Complaint:   Per triage note--Abdominal Pain (Epigastric pain and generalized aches started this morning.)      History of Present Illness:  Ms. Stallings is a 62 y.o. female with a PMH of GERD, Hiatal hernia, and rectal mass (biopsy showed high grade dysplasia - patient refuses any surgical intervention of this mass). She presented to the hospital on 9/12/22 with acute onset left upper quadrant abdominal pain. Denies chest pain, nausea, vomiting, or change in stool caliber. Denies RUQ pain after meals.    Past Medical History:  Past Medical History:   Diagnosis Date    Asthma     GERD (gastroesophageal reflux disease)     Hypertension     Lumbago     Sciatica        Home Medications:  No current facility-administered medications on file prior to encounter.     Current Outpatient Medications on File Prior to Encounter   Medication Sig    alendronate (FOSAMAX) 10 MG Tab Take 10 mg by mouth once daily.    cyclobenzaprine (FLEXERIL) 10 MG tablet Take 1 tablet (10 mg total) by mouth 3 (three) times daily as needed for Muscle spasms.    hydroCHLOROthiazide (HYDRODIURIL) 12.5 MG Tab Take 1 tablet (12.5 mg total) by mouth once daily.    omeprazole (PRILOSEC) 20 MG capsule Take 1 capsule (20 mg total) by mouth once daily.    ondansetron (ZOFRAN-ODT) 4 MG TbDL Take 2 tablets (8 mg total) by mouth nightly as needed (nausea).    oxyCODONE-acetaminophen (PERCOCET) 7.5-325 mg per tablet Take 1 tablet by mouth every 24 hours as needed for Pain.    pregabalin (LYRICA) 75 MG capsule Take 1 capsule (75 mg total) by mouth nightly as needed (sciatica).    PROAIR HFA 90 mcg/actuation inhaler Inhale 2 puffs into the lungs every 4 (four) hours as needed.       Allergies:  Review of patient's allergies indicates:   Allergen Reactions    Amoxicillin-pot clavulanate Hives     Other reaction(s): Swelling    Iodine and iodide containing products      Other reaction(s): Burning sensation     Sulfamethoxazole-trimethoprim      Other reaction(s): Constipation, Dizziness, Loss of appetite, Rapid heart beat, Shaking       Surgical History:  Past Surgical History:   Procedure Laterality Date    APPENDECTOMY       SECTION      COLONOSCOPY      ESOPHAGOGASTRECTOMY      HYSTERECTOMY      POLYPECTOMY      RHINOPLASTY      TONSILLECTOMY         Family History:  Family History   Problem Relation Age of Onset    Asthma Father     Heart disease Brother     Arthritis Brother        Social History:  Social History     Tobacco Use    Smoking status: Former     Types: Cigarettes    Smokeless tobacco: Never   Substance Use Topics    Alcohol use: Not Currently    Drug use: Never        Review of Systems:  Skin: No rashes or itching.  Head: Denies headache or recent trauma.  Eyes: Denies eye pain or double vision.  Neck: Denies swelling or hoarseness of voice.  Respiratory: Denies shortness of breath or chest pain  Cardiac: Denies palpitations or swelling in hands/feet.  Gastrointestinal: Denies nausea, denies vomiting. Positive for abdominal pain.  Urinary: Denies dysuria or hematuria.  Vascular: Denies claudication or leg swelling.  Neuro: Denies motor deficits. Denies weakness.  Endocrine: Denies excessive sweating or cold intolerance.  Psych: Denies memory problems. Denies anxiety.      OBJECTIVE:     Vital Signs:  Temp: 97.5 °F (36.4 °C) (22)  Pulse: 62 (22)  Resp: 16 (22 1425)  BP: (!) 179/66 (22)  SpO2: 96 % (22)    Physical Exam:  General: well developed, well nourished, no distress  HEENT: NCAT, EOMI  Neck: supple, symmetrical  Lungs: Normal WOB, No SOB  Cardiovascular: regular rate  Abdomen: soft, nondistended, nontender to palpation  Skin: Skin color, texture, turgor normal. No rashes or lesions  Musculoskeletal:no clubbing, cyanosis, no deformities  Neurologic: No focal numbness or weakness  Psych/Behavioral:  Alert and oriented, appropriate  affect.    Laboratory:  Labs Reviewed   COMPREHENSIVE METABOLIC PANEL - Abnormal; Notable for the following components:       Result Value    Glucose Level 137 (*)     Globulin 3.6 (*)     Albumin/Globulin Ratio 0.9 (*)     Bilirubin Total 1.9 (*)     Alkaline Phosphatase 154 (*)     Alanine Aminotransferase 628 (*)     Aspartate Aminotransferase 1,085 (*)     All other components within normal limits   PHOSPHORUS - Abnormal; Notable for the following components:    Phosphorus Level 1.4 (*)     All other components within normal limits   CBC WITH DIFFERENTIAL - Abnormal; Notable for the following components:    WBC 20.7 (*)     MCHC 31.3 (*)     MPV 10.6 (*)     Lymph # 0.46 (*)     Neut # 19.8 (*)     IG# 0.10 (*)     All other components within normal limits   URINALYSIS - Abnormal; Notable for the following components:    Urobilinogen, UA 1+ (*)     Squamous Epithelial Cells, UA Trace (*)     Mucous, UA Trace (*)     All other components within normal limits   DRUG SCREEN, URINE (BEAKER) - Abnormal; Notable for the following components:    Cannabinoids, Urine Positive (*)     All other components within normal limits    Narrative:     Cut off concentrations:                    Amphetamines - 1000 ng/ml                  Barbiturates - 200 ng/ml                  Benzodiazepine - 200 ng/ml                  Cannabinoids (THC) - 50 ng/ml                  Cocaine - 300 ng/ml                  Fentanyl - 1.0 ng/ml                  MDMA - 500 ng/ml                  Opiates - 300 ng/ml                   Phencyclidine (PCP) - 25 ng/ml                                    Specimen submitted for drug analysis and tested for pH and specific gravity in order to evaluate sample integrity. Suspect tampering if specific gravity is <1.003 and/or pH is not within the range of 4.5 - 8.0                  False negatives may result form substances such as bleach added to urine.                  False positives may result for the presence  of a substance with similar chemical structure to the drug or its metabolite.                                    This test provides only a PRELIMINARY analytical test result. A more specific alternate chemical method must be used in order to obtain a confirmed analytical result. Gas chromatography/mass spectrometry (GC/MS) is the preferred confirmatory method. Other chemical confirmation methods are available. Clinical consideration and professional judgement should be applied to any drug of abuse test result, particularly when preliminary positive results are used.                                    Positive results will be confirmed only at the physicians request. Unconfirmed screening results are to be used only for medical purposes (treatment).                                        BILIRUBIN, DIRECT - Abnormal; Notable for the following components:    Bilirubin Direct 1.1 (*)     All other components within normal limits   POCT GLUCOSE - Abnormal; Notable for the following components:    POCT Glucose 146 (*)     All other components within normal limits   AMMONIA - Normal   MAGNESIUM - Normal   ALCOHOL,MEDICAL (ETHANOL) - Normal   COVID/FLU A&B PCR - Normal   LIPASE - Normal   HEPATITIS PANEL, ACUTE - Normal   LACTIC ACID, PLASMA - Normal   BLOOD CULTURE [facility]   BLOOD CULTURE [facility]   CBC W/ AUTO DIFFERENTIAL    Narrative:     The following orders were created for panel order CBC auto differential.                  Procedure                               Abnormality         Status                                     ---------                               -----------         ------                                     CBC with Differential[[id number]]        Abnormal            Final result                                                 Please view results for these tests on the individual orders.   POCT GLUCOSE MONITORING CONTINUOUS         Diagnostic Results:  Imaging Results              CT Abdomen Pelvis  Without  Contrast (Final result)  Result time 09/12/22 22:06:12      Final result by Reza Mariano MD (09/12/22 22:06:12)                   Impression:      Pneumobilia.    Hiatal hernia.      Electronically signed by: Reza Mariano MD  Date:    09/12/2022  Time:    22:06               Narrative:    EXAMINATION:  CT ABDOMEN PELVIS WITHOUT CONTRAST    CLINICAL HISTORY:  Abdominal abscess/infection suspected;Abdominal pain, acute, nonlocalized;    TECHNIQUE:  Low dose axial images, sagittal and coronal reformations were obtained from the lung bases to the pubic symphysis.  No oral contrast was given.    Automatic exposure control (AEC) was utilized for dose reduction.    Dose: 226 mGycm    COMPARISON:  02/18/2022    FINDINGS:  There is a hiatal hernia present.  There is pneumobilia.  Spleen appears normal.  Pancreas appears normal.  The adrenals are not enlarged.  Kidneys appear normal.  Aorta shows calcification without an aneurysm present no distal ureteral lithiasis is seen there degenerative changes at L5-S1.  The appendix is not identified.    There are diverticulum in the colon without evidence of acute diverticulitis                                       US Abdomen Limited (Final result)  Result time 09/12/22 18:34:42      Final result by Jostin Scott MD (09/12/22 18:34:42)                   Impression:      Gallbladder not visualized.  Borderline dilatation of the common bile duct for a presumably post cholecystectomy patient.  There is underlying pneumobilia, but a 12 mm echogenic area in the distal common bile duct may be choledocholithiasis.  If needed, CT can be considered for further assessment.  The pneumobilia may significantly limit the quality of any MRCP images.      Electronically signed by: Jostin Scott  Date:    09/12/2022  Time:    18:34               Narrative:    EXAMINATION:  US ABDOMEN LIMITED    CLINICAL HISTORY:  hepatitis;    TECHNIQUE:  Gray-scale and color Doppler ultrasound images of the  abdomen.    COMPARISON:  Ultrasound 03/04/2022    CT abdomen/pelvis 02/18/2022    FINDINGS:  No pancreatic abnormality appreciated.    Main portal vein patent with normal flow direction.  Normal caliber of the superior IVC.    Cirrhotic liver morphology.  Underlying pneumobilia.  Gallbladder not seen.  Oval echogenic area in the distal portion of the common bile duct near the pancreatic head measures 12 mm with posterior shadowing.  No significant ascites.    No hydronephrosis or defined calcification in the right kidney.    Measurements:    - Liver: 14.4 cm    - CBD diameter: 10 mm    - Right kidney: 10.1 cm in length                                       X-Ray Abdomen AP 1 View (Final result)  Result time 09/12/22 15:09:40      Final result by Syed Crandall MD (09/12/22 15:09:40)                   Impression:      Residual feces.    Nonspecific gas pattern      Electronically signed by: Syed Crandall  Date:    09/12/2022  Time:    15:09               Narrative:    EXAMINATION:  XR ABDOMEN AP 1 VIEW    CLINICAL HISTORY:  abdominal discomfort;    TECHNIQUE:  AP X-RAY OF THE ABDOMEN:    CPT 88303    FINDINGS:  Examination reveals residual feces throughout the colon gas pattern is nonspecific with no clear evidence of ileus or obstruction no abnormal masses or calcifications identified                                       X-Ray Chest 1 View (Final result)  Result time 09/12/22 14:41:09      Final result by Aziza Barry MD (09/12/22 14:41:09)                   Impression:      No acute abnormality of the chest.      Electronically signed by: Aziza Barry  Date:    09/12/2022  Time:    14:41               Narrative:    EXAMINATION:  XR CHEST 1 VIEW    CLINICAL HISTORY:  Unspecified abdominal pain    TECHNIQUE:  AP chest    COMPARISON:  None.    FINDINGS:  The heart is normal in size.  The lungs are clear without focal consolidation.  There is no pleural effusion or visible pneumothorax.                                        ASSESSMENT:     62 y.o. female with a PMH of GERD, Hiatal hernia, chronic pneumobilia, and rectal mass (biopsy showed high grade dysplasia - patient refuses any surgical intervention of this mass). She presented to the hospital on 9/12/22 with acute onset left upper quadrant abdominal pain. Workup showed choledocholithiasis. All imaging has failed to show a gallbladder including US and CT. Review of previous US and CT in [facility] also failed to show a gallbladder.    PLAN:     - No acute surgical intervention  - There is no gallbladder clearly visualized on US, CT, or the cholangiogram that was performed yesterday. Although patient denies ever having gallbladder surgery nor does she have the surgical scars that would be consistent with cholecystectomy.  - It is possible that she has an occlusion of the cystic duct with a very small gallbladder. It is also possible that she has congenital abscess of the gallbladder.  - Will discuss role for further imaging including HIDA with GI tomorrow  - Trend CMP    Gabe Diehl MD  General Surgery [facility]

## 2022-09-13 NOTE — ANESTHESIA PROCEDURE NOTES
Intubation    Date/Time: 9/13/2022 12:25 PM  Performed by: Gem Melendez CRNA  Authorized by: Jennifer Lee MD     Intubation:     Induction:  Intravenous    Intubated:  Postinduction    Mask Ventilation:  Easy mask    Attempts:  1    Attempted By:  CRNA    Method of Intubation:  Direct    Blade:  Dee 2    Laryngeal View Grade: Grade IIA - cords partially seen      Difficult Airway Encountered?: No      Complications:  None    Airway Device:  Oral endotracheal tube    Airway Device Size:  7.5    Style/Cuff Inflation:  Cuffed (inflated to minimal occlusive pressure)    Inflation Amount (mL):  6    Tube secured:  21    Secured at:  The lips    Placement Verified By:  Capnometry    Complicating Factors:  None    Findings Post-Intubation:  BS equal bilateral and atraumatic/condition of teeth unchanged

## 2022-09-13 NOTE — ASSESSMENT & PLAN NOTE
- AST: 1085; ALT: 628, not previously elevated on chart review  - Hepatitis panel negative   - APTT, PT/INR, GGT pending  - Repeat CMP and CBC 9/13 AM  - CT abdomen and pelvis pending   - etiology unclear at this time

## 2022-09-13 NOTE — ASSESSMENT & PLAN NOTE
- WBC: 20.7, tachypneic on arrival at 22, tachycardic on arrival at 111bpm, (3/4 SIRS), no clear source of infection at this time  - received one dose of cipro, flagyl in ED, zosyn initiated   - blood culture pending   - urine culture pending   - pt received 1L bolus of NS in ED, continue fluid resuscitation of LR @ 100mL/hr.

## 2022-09-13 NOTE — CONSULTS
Gastroenterology/Hepatology Initial Consult Note    Patient Name: Lore Stallings  Age: 62 y.o.  : 1960  MRN: [id number]  Admission Date: 2022    Reason for Consult:      Medical Management  Chief Complaint   Patient presents with    Abdominal Pain     Epigastric pain and generalized aches started this morning.         Self, Aaareferral    HPI:     Lore Stallings is a 62 y.o.  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 She was admitted for transaminitis, abdominal discomfort and moderate dehydration.     ER Labs: WBC 20.7, alk phos 154, total bilirubin 1.9, AST 1,085, . Hepatitis panel negative. Alcohol level negative. UDS positive for cannabinoids.     CT of abdomen and pelvis without contrast: There is a hiatal hernia present.  There is pneumobilia.  Spleen appears normal.  Pancreas appears normal.  The adrenals are not enlarged.  Kidneys appear normal. Aorta shows calcification without an aneurysm present. No distal ureteral lithiasis is seen. There are degenerative changes at L5-S1. The appendix is not identified. There are diverticulum in the colon without evidence of acute diverticulitis    Abdominal US: Cirrhotic liver morphology. Underlying pneumobilia. Gallbladder not visualized. Oval echogenic area in the distal portion of the common bile duct near the pancreatic head measures 12 mm with posterior shadowing may be choledocholithiasis. Common bile duct diameter 10 mm.     Abdominal x-ray: residual feces.     GI services consulted for possible choledocholithiasis, pneumobilia, duct dilation, elevated liver function and biliary tests. She is known to [facility].     She reports difficulty with epigastric and LUQ abdominal  pain since she was 37 or 38 years old. Abdominal pain is now worse due to an increase in frequency and intensity. She describes the pain as intermittent and states that it feels like her stomach is being strangled. She feels that the pain is related to the hiatal hernia. The pain is worsened by deep breathing and movement. If she lies on her left side, her pain is worse. She is unable to identify any alleviating factors. She denies nausea but reports vomiting twice in the ED yesterday. She describes the emesis as black with no red blood seen. She typically has one bowel movement daily which is watery with some formed pieces of stool and brown in color. Last BM at 5 AM this morning was soft and brown. Denies melena and hematochezia. She maintains a good appetite eating 2 meals daily with no reported weight loss. She has a history of reflux which occurs approximately 2-3 times per week mostly occurring at bedtime when she lies down to go to sleep. She uses 2 pillows to prop herself up in order to reduce symptoms. She is also taking omeprazole daily. She also c/o being hoarse for the past month and having difficulty swallowing when eating. She has a sensation that food gets stuck in her throat with almost every meal. This also occurs with thin liquids but less frequent. She chews her food on the left side of her mouth due to missing teeth on the right.     Recent surgical history: exploratory laparotomy, removal of adnexal mass, total abdominal hysterectomy, bilateral salpingooherectomy, appendectomy, & cystoscopy.    Patient denies history of cholecystectomy.     Denies regular NSAID use. Denies current tobacco use since stopping on July 1, 2022. Previously smoked 2 ppd for 45 years. She reports social alcohol use approximately once monthly, one beer or one glass of wine. Last alcohol intake 4 days ago when she drank a glass of wine. Denies a family history of IBD, colon polyps or colon cancer.    Prior  endoscopies:    EGD done on 11/3/2022 per Dr. KRYSTINA Sandy for abdominal pain in left upper quadrant. A 2 cm sliding hiatal hernia was found. Diffuse mildly erythematous mucosa without bleeding was found in the entire examined stomach. Biopsies were taken with cold forceps for histology. Stomach, biopsy with no significant changes and no Helicobacter pylori organisms identified. The examined duodenum was normal.     Colonoscopy done on 11/3/2022 per Dr. KRYSTINA Sandy for positive fecal immunochemical test. Preparation of the colon was poor. The terminal ileum appeared normal. A 20 mm polyp was found in the transverse colon removed using  injection-lift technique and a hot snare. The polyp was removed using a piecemeal technique using a hot snare. Resected and retrieved. Tattooed. Transverse colon polyp, polypectomy: adenomatous polyp with no evidence of malignancy. Two 3 mm sessile polyps were found in the sigmoid colon removed with a cold snare. Resected and retrieved. Sigmoid colon polyps x 2, polypectomy: adenomatous poly with no evidence of malignancy. Two 3 mm sessile polyps in the rectum removed with a cold snare. Resected and retrieved. Rectal polyps x 2, polypectomy: adenomatous polyp with no evidence of malignancy. A frond-like/villous and sessile non-obstructing large mass/polyp was found in the proximal rectum, located at the third rectal valve. The mass was partially circumferential (involving 70% of the lumen circumference). The mass measured 5 cm in length. The bottom of the polyp/mass was located 8 cm from the anal verge. No bleeding was present. Biopsies were taken with cold forceps for histology to rule out malignancy. Rectal mass biopsy: Fragments of an adenomatous polyp with high grade dysplasia.Tattooed. Mild diverticulosis in the sigmoid colon. Recommendations: refer to a colo-rectal surgeon or advanced endoscopist for EMR at appointment to be scheduled pending pathology. Repeat colonoscopy in 1  year for surveillance. Of note, she was seen by seen in [facility] on 3/29/2022 at which time she refused surgery. She was seen again in [facility] on 2022 and patient was not interested in any treatment measures to address the rectal mass.     Kyra Reed MD    Past Medical History:   Diagnosis Date    Asthma     GERD (gastroesophageal reflux disease)     Hypertension     Lumbago     Sciatica         Past Surgical History:   Procedure Laterality Date    APPENDECTOMY       SECTION      COLONOSCOPY      ESOPHAGOGASTRECTOMY      HYSTERECTOMY      POLYPECTOMY      RHINOPLASTY      TONSILLECTOMY          Family History   Problem Relation Age of Onset    Asthma Father     Heart disease Brother     Arthritis Brother        Social History     Tobacco Use    Smoking status: Former     Types: Cigarettes    Smokeless tobacco: Never   Substance Use Topics    Alcohol use: Not Currently         Review of patient's allergies indicates:   Allergen Reactions    Amoxicillin-pot clavulanate Hives     Other reaction(s): Swelling    Iodine and iodide containing products      Other reaction(s): Burning sensation    Sulfamethoxazole-trimethoprim      Other reaction(s): Constipation, Dizziness, Loss of appetite, Rapid heart beat, Shaking        Medications Prior to Admission   Medication Sig Dispense Refill Last Dose    alendronate (FOSAMAX) 10 MG Tab Take 10 mg by mouth once daily.       cyclobenzaprine (FLEXERIL) 10 MG tablet Take 1 tablet (10 mg total) by mouth 3 (three) times daily as needed for Muscle spasms. 90 tablet 2     hydroCHLOROthiazide (HYDRODIURIL) 12.5 MG Tab Take 1 tablet (12.5 mg total) by mouth once daily. 30 tablet 2     omeprazole (PRILOSEC) 20 MG capsule Take 1 capsule (20 mg total) by mouth once daily. 30 capsule 11     ondansetron (ZOFRAN-ODT) 4 MG TbDL Take 2 tablets (8 mg total) by mouth nightly as needed (nausea). 30 tablet 2     oxyCODONE-acetaminophen (PERCOCET)  7.5-325 mg per tablet Take 1 tablet by mouth every 24 hours as needed for Pain. 30 tablet 0     pregabalin (LYRICA) 75 MG capsule Take 1 capsule (75 mg total) by mouth nightly as needed (sciatica). 30 capsule 2     PROAIR HFA 90 mcg/actuation inhaler Inhale 2 puffs into the lungs every 4 (four) hours as needed. 18 g 11          INPATIENT MEDICATIONS    Scheduled Meds:   hydroCHLOROthiazide  12.5 mg Oral Daily    iopamidoL        iopamidoL        pantoprozole (PROTONIX) IV  40 mg Intravenous Daily    piperacillin-tazobactam (ZOSYN) IVPB  4.5 g Intravenous Q8H     Continuous Infusions:   lactated ringers 100 mL/hr at 09/13/22 0909     PRN Meds:  ibuprofen, indomethacin, sodium chloride 0.9%          Review of Systems:       Review of Systems   Constitutional:  Negative for activity change, appetite change, chills, fatigue, fever and unexpected weight change.   HENT:  Positive for trouble swallowing.    Respiratory: Negative.     Cardiovascular: Negative.    Gastrointestinal:  Positive for abdominal pain (intermittent epigastric, LUQ), diarrhea, vomiting (2 episodes of vomiting in ED yesterday, no vomiting since then) and reflux. Negative for abdominal distention, anal bleeding, blood in stool, change in bowel habit, constipation, nausea, rectal pain, fecal incontinence and change in bowel habit.   Genitourinary:  Negative for difficulty urinating, dysuria, flank pain, frequency, hematuria and urgency.   Musculoskeletal: Negative.    Integumentary:  Negative for rash.   Neurological:  Negative for dizziness and weakness.        Objective:       VITAL SIGNS: 24 HR MIN & MAX LAST    Temp  Min: 97.7 °F (36.5 °C)  Max: 98.3 °F (36.8 °C)  98.2 °F (36.8 °C)        BP  Min: 130/73  Max: 187/76  130/73     Pulse  Min: 85  Max: 111  93     Resp  Min: 18  Max: 22  18    SpO2  Min: 95 %  Max: 99 %  96 %        Physical Exam  Constitutional:       General: She is awake. She is not in acute distress.  HENT:      Mouth/Throat:       Mouth: Mucous membranes are moist.   Eyes:      General: No scleral icterus.     Extraocular Movements: Extraocular movements intact.   Cardiovascular:      Rate and Rhythm: Normal rate and regular rhythm.      Pulses: Normal pulses.      Heart sounds: Normal heart sounds.   Pulmonary:      Effort: Pulmonary effort is normal. No respiratory distress.      Breath sounds: Normal breath sounds.   Abdominal:      General: Bowel sounds are normal. There is no distension (mild; epigastric and LUQ).      Palpations: Abdomen is soft. There is no mass.      Tenderness: There is abdominal tenderness. There is no guarding or rebound.      Hernia: No hernia is present.   Skin:     General: Skin is warm and dry.      Coloration: Skin is not jaundiced.      Findings: No rash.   Neurological:      General: No focal deficit present.      Mental Status: She is alert and oriented to person, place, and time.   Psychiatric:         Behavior: Behavior is cooperative.            LABS:      Recent Labs   Lab 09/12/22 1332 09/13/22  0400   WBC 20.7* 20.3*   HGB 12.6 11.3*   HCT 40.2 34.5*    260   MCV 93.9 90.6       Recent Labs   Lab 09/12/22 1332 09/13/22  0400   HGB 12.6 11.3*   HCT 40.2 34.5*        Recent Labs   Lab 09/12/22 1332 09/13/22  0400    141   K 4.1 3.5   CO2 25 23   BUN 11.7 9.2*   CREATININE 0.60 0.60   BILITOT 1.9* 4.1*   BILIDIR 1.1*  --    ALKPHOS 154* 155*   AST 1,085* 327*   * 391*   LABPROT 7.0 5.9   ALBUMIN 3.4 2.9*        Recent Labs   Lab 09/13/22  0400   INR 1.09   PROTIME 13.9   PTT 30.4        No results for input(s): IRON, FERRITIN in the last 168 hours.       IMAGING:   X-Ray Chest 1 View    Result Date: 9/12/2022  EXAMINATION: XR CHEST 1 VIEW CLINICAL HISTORY: Unspecified abdominal pain TECHNIQUE: AP chest COMPARISON: None. FINDINGS: The heart is normal in size.  The lungs are clear without focal consolidation.  There is no pleural effusion or visible pneumothorax.     No acute  abnormality of the chest. Electronically signed by: Aziza Barry Date:    09/12/2022 Time:    14:41    X-Ray Knee 1 or 2 View Right    Result Date: 8/24/2022  EXAMINATION: XR KNEE 1 OR 2 VIEW RIGHT CLINICAL HISTORY: Unspecified fall, initial encounter COMPARISON: None. FINDINGS: No acute displaced fractures or dislocations. Joint spaces preserved. No blastic or lytic lesions. Soft tissues within normal limits.     No acute osseous abnormality. Electronically signed by: Syed Crandall Date:    08/24/2022 Time:    13:08    X-Ray Abdomen AP 1 View    Result Date: 9/12/2022  EXAMINATION: XR ABDOMEN AP 1 VIEW CLINICAL HISTORY: abdominal discomfort; TECHNIQUE: AP X-RAY OF THE ABDOMEN: CPT 47588 FINDINGS: Examination reveals residual feces throughout the colon gas pattern is nonspecific with no clear evidence of ileus or obstruction no abnormal masses or calcifications identified     Residual feces. Nonspecific gas pattern Electronically signed by: Syed Crandall Date:    09/12/2022 Time:    15:09    US Abdomen Limited    Result Date: 9/12/2022  EXAMINATION: US ABDOMEN LIMITED CLINICAL HISTORY: hepatitis; TECHNIQUE: Gray-scale and color Doppler ultrasound images of the abdomen. COMPARISON: Ultrasound 03/04/2022 CT abdomen/pelvis 02/18/2022 FINDINGS: No pancreatic abnormality appreciated. Main portal vein patent with normal flow direction.  Normal caliber of the superior IVC. Cirrhotic liver morphology.  Underlying pneumobilia.  Gallbladder not seen.  Oval echogenic area in the distal portion of the common bile duct near the pancreatic head measures 12 mm with posterior shadowing.  No significant ascites. No hydronephrosis or defined calcification in the right kidney. Measurements: - Liver: 14.4 cm - CBD diameter: 10 mm - Right kidney: 10.1 cm in length     Gallbladder not visualized.  Borderline dilatation of the common bile duct for a presumably post cholecystectomy patient.  There is underlying pneumobilia, but  a 12 mm echogenic area in the distal common bile duct may be choledocholithiasis.  If needed, CT can be considered for further assessment.  The pneumobilia may significantly limit the quality of any MRCP images. Electronically signed by: Jostin Scott Date:    09/12/2022 Time:    18:34    CT Abdomen Pelvis  Without Contrast    Result Date: 9/12/2022  EXAMINATION: CT ABDOMEN PELVIS WITHOUT CONTRAST CLINICAL HISTORY: Abdominal abscess/infection suspected;Abdominal pain, acute, nonlocalized; TECHNIQUE: Low dose axial images, sagittal and coronal reformations were obtained from the lung bases to the pubic symphysis.  No oral contrast was given. Automatic exposure control (AEC) was utilized for dose reduction. Dose: 226 mGycm COMPARISON: 02/18/2022 FINDINGS: There is a hiatal hernia present.  There is pneumobilia.  Spleen appears normal.  Pancreas appears normal.  The adrenals are not enlarged.  Kidneys appear normal.  Aorta shows calcification without an aneurysm present no distal ureteral lithiasis is seen there degenerative changes at L5-S1.  The appendix is not identified. There are diverticulum in the colon without evidence of acute diverticulitis     Pneumobilia. Hiatal hernia. Electronically signed by: Reza Mariano MD Date:    09/12/2022 Time:    22:06        Assessment / Plan:     Transaminitis  -Plan for ERCP today.  -NPO for ERCP.   -Continue antibiotics.

## 2022-09-13 NOTE — ASSESSMENT & PLAN NOTE
- phosphate 1.4,  repleting phos     Scribe Attestation (For Scribes USE Only)... Scribe Attestation (For Scribes USE Only).../Attending Attestation (For Attendings USE Only)...

## 2022-09-13 NOTE — TRANSFER OF CARE
"Anesthesia Transfer of Care Note    Patient: Lore Stallings    Procedure(s) Performed: Procedure(s) (LRB):  ERCP (ENDOSCOPIC RETROGRADE CHOLANGIOPANCREATOGRAPHY) (N/A)    Patient location: PACU    Anesthesia Type: general    Transport from OR: Transported from OR on room air with adequate spontaneous ventilation    Post pain: adequate analgesia    Post assessment: no apparent anesthetic complications    Post vital signs: stable    Level of consciousness: awake and sedated    Nausea/Vomiting: no nausea/vomiting    Complications: none    Transfer of care protocol was followed      Last vitals:   Visit Vitals  /73   Pulse 93   Temp 36.8 °C (98.2 °F) (Oral)   Resp 18   Ht 5' 6" (1.676 m)   Wt 54.1 kg (119 lb 4.3 oz)   SpO2 96%   BMI 19.25 kg/m²     "

## 2022-09-13 NOTE — PLAN OF CARE
TRANSITION OF CARE PROGRESS NOTE    I have received checkout from Dr. Escobedo regarding this patient.     HO-1 assessment:  Admission diagnosis: Transaminitis  Overnight management: Continue Zosyn, IVF. NPO after midnight. S/p ERCP. Will F/U surgery recs. Repleting K. Ibuprofen 600 PRN for pain; will consider PRN Tylenol, Toradol, or morphine for increased severity of pain.  Code status: DNI    Please call Dr. Brenton Briggs [phone] from 09/13/2022 4PM to 09/14/2022 7AM if any issues arise.    Johanna Brown  [facility] HO-1

## 2022-09-13 NOTE — ANESTHESIA POSTPROCEDURE EVALUATION
Anesthesia Post Evaluation    Patient: Lore Stallings    Procedure(s) Performed: Procedure(s) (LRB):  ERCP, WITH SPHINCTEROTOMY (N/A)    Final Anesthesia Type: general      Patient location during evaluation: PACU  Post-procedure vital signs: reviewed and stable  Airway patency: patent      Anesthetic complications: no      Cardiovascular status: hemodynamically stable  Respiratory status: spontaneous ventilation  Follow-up not needed.          Vitals Value Taken Time   /68 09/13/22 1551   Temp 36.3 °C (97.3 °F) 09/13/22 1551   Pulse 66 09/13/22 1551   Resp  09/13/22 1553   SpO2 98 % 09/13/22 1551         Event Time   Out of Recovery 09/13/2022 14:28:00         Pain/Saurav Score: Saurav Score: 7 (9/13/2022  2:38 PM)

## 2022-09-13 NOTE — PROGRESS NOTES
"Inpatient Nutrition Evaluation    Admit Date: 2022   Total duration of encounter: 1 day    Nutrition Recommendation/Prescription      Suggest Low Na diet  Monitor Weights Weekly     Nutrition Assessment     Chart Review    Reason Seen: continuous nutrition monitoring    Diagnosis:  SIRS 2/4 unknown source, Hyperbilirubinemia, Cirrhotic liver, Rectal Mass, hypophosphatemia, HTN, GERD    Relevant Medical History: Hiatal Hernia, Rectal Mass, GERD    Nutrition-Related Medications: Protonix, Zosyn, HCTZ, LR    Nutrition-Related Labs:  22 -- Glu 81 L, K 3.5, BUN 9.2, Cr 0.6,  H,  H    Diet Order: Diet NPO  Oral Supplement Order: none at this time  Appetite/Oral Intake: poor/NPO  Factors Affecting Nutritional Intake: abdominal pain and decreased appetite  Food/Yazdanism/Cultural Preferences: none reported       Wound(s):   skin intact    Comments    22 -- Pt NPO this am for test/procedure; pt reports good appetite PTA, however poor this am; no n/v; wts stable    Anthropometrics    Height: 5' 6" (167.6 cm)    Last Weight: 54.1 kg (119 lb 4.3 oz) (22 1232)    BMI (Calculated): 19.3  BMI Classification: normal (BMI 18.5-24.9)  Ideal Body Weight (IBW), Female: 130 lb  % Ideal Body Weight, Female (lb): 91.75 %                 Usual Body Weight (UBW), k.5 kg  % Usual Body Weight: 99.47    Wt Readings from Last 5 Encounters:   22 54.1 kg (119 lb 4.3 oz)   22 57.3 kg (126 lb 5.2 oz)   22 49.9 kg (110 lb)   22 54.6 kg (120 lb 5.9 oz)   22 55.1 kg (121 lb 6.4 oz)     Weight Change(s) Since Admission:  Admit Weight: 54.1 kg (119 lb 4.3 oz) (22 1232)      Patient Education    Not applicable.    Monitoring & Evaluation     Dietitian will monitor food and beverage intake, weight change, electrolyte/renal panel, and gastrointestinal profile.  Nutrition Risk/Follow-Up: low (follow-up in 5-7 days)  Patients assigned 'low nutrition risk' status do not qualify for a " full nutritional assessment but will be monitored and re-evaluated in a 5-7 day time period.

## 2022-09-13 NOTE — NURSING
Pt returned to her room via recovery bed per González Thao RN. Pt transferred back into her bed x 2 assist. Pt appears drowsy and moaning. She is AAOX3. Receiving LR bolus to 20g lt wrist PIV. Currently on the bedpan. Will continue to monitor and obtain post-op vitals. Bed low and locked. Call bell in reach. Encouraged to call for assist. Bed rails up x3.

## 2022-09-13 NOTE — CARE UPDATE
Pt is 63 y/o female with PMH of hiatal hernia, rectal mass, and GERD presents with nearly 1 day history of epigastric and LUQ abdominal pain. Pt reports when pain occurs she feels a full body discomfort and becomes almost SOB. One episode of N/V after arriving to ED. She believes symptoms are result of hiatal hernia which she would like to have corrected. No fever. Symptoms not associated by meal size or foods. Pt adamant she does not want further work up for rectal mass. Only hiatal hernia. Pt confirms taking Flexaril, aldendronate, and omeprazole daily and possibly lyrica. Pt not taking BP medications.   Pt reports alcohol use once in 6 months. Last drink 3 days ago. Quit smoking 1.5 months ago.   Recent surgical hx:  s/p exploratory laparotomy, removal of adnexal mass, total abdominal hysterectomy, bilateral salpingooherectomy, appendectomy, & cystoscopy.    Physical exam  Constitutional: appears comfortable, no distress  CV: RRR, no murmurs  Resp: clear B/L, no wheezing or crackles  Abdomen: not distended, BS+, obese, soft, tender epigastrum    SIRS 2/4 unknown source  Hyperbilirubinemia  Hypertransaminasemia  Cirrhotic liver- on U/S  Rectal Mass with rectal wall thickening and known dysplasia  Hiatal hernia  HTN  GERD  Hypophosphatemia  Subclinical hypothyroidism    Admit to inpatient family medicine service  Pt currently stable condition. Admit for further work up and imaging given unknown possible infection source and possible progression of known mass  CT abd/pelvis ordered  GI/Surgical consult pending CT imaging  Zosyn IV suspected intraabdominal source.  Blood Cx pending  PT, PTT, GGT added to morning labs  CIWA  Protonix 40 mg IV  LR @ 100mL/hr  NPO  SCDs       Tosin Holly M.D.  [facility] HO-2

## 2022-09-13 NOTE — H&P
[facility] Medicine  History & Physical    Patient Name: Lore Stallings  MRN: [id number]  Patient Class: IP- Inpatient  Admission Date: 9/12/2022  Attending Physician: Stanley Díaz MD  Primary Care Provider: Kyra Reed MD         Patient information was obtained from patient and ER records.     Subjective:     Principal Problem:Transaminitis    Chief Complaint:   Chief Complaint   Patient presents with    Abdominal Pain     Epigastric pain and generalized aches started this morning.        HPI: Pt with PMH of GERD, hiatal hernia, rectal mass presented to ED with abdominal pain. Reports the pain woke her up 0300 on 9/12 due to severe epigastric and LUQ pain that she attributes to her hiatal hernia. The pain was intense initially then decreased throughout the morning and remains mild at admission. No alleviating factors. Does not worsen with food intake or bowel movements. Associated symptoms include nausea and one episode of vomiting. Denies fever, chills, CP, SOB, diarrhea, constipation. She denies alcohol abuse, stating occasional wine every 6mo, last etoh intake was 3 days ago. In ED, etoh negative, UDS positive for cannabinoids. CBC significant for elevated WBC at 20.7k. Hepatitis panel negative. On U/S, gallbladder not visualized, with concern for choledocholithiasis. Pt is unsure if she has a gallbladder. She has hx of abdominal surgery with total hysterectomy b/l SBO for adnexal mass as well as an appendectomy. She has been evaluated by surgery on multiple occasions and declines intervention for her rectal mass since 03/2022. She has previous biopsy of unknown date showing high grade dysplasia of rectal mass as reported on MRI of rectal cancer on 3/17/2022.       Past Medical History:   Diagnosis Date    Asthma     GERD (gastroesophageal reflux disease)     Hypertension     Lumbago     Sciatica        Past Surgical History:   Procedure Laterality Date     APPENDECTOMY       SECTION      COLONOSCOPY      ESOPHAGOGASTRECTOMY      HYSTERECTOMY      POLYPECTOMY      RHINOPLASTY      TONSILLECTOMY         Review of patient's allergies indicates:   Allergen Reactions    Amoxicillin-pot clavulanate Hives     Other reaction(s): Swelling    Iodine and iodide containing products      Other reaction(s): Burning sensation    Sulfamethoxazole-trimethoprim      Other reaction(s): Constipation, Dizziness, Loss of appetite, Rapid heart beat, Shaking       No current facility-administered medications on file prior to encounter.     Current Outpatient Medications on File Prior to Encounter   Medication Sig    alendronate (FOSAMAX) 10 MG Tab Take 10 mg by mouth once daily.    cyclobenzaprine (FLEXERIL) 10 MG tablet Take 1 tablet (10 mg total) by mouth 3 (three) times daily as needed for Muscle spasms.    hydroCHLOROthiazide (HYDRODIURIL) 12.5 MG Tab Take 1 tablet (12.5 mg total) by mouth once daily.    omeprazole (PRILOSEC) 20 MG capsule Take 1 capsule (20 mg total) by mouth once daily.    ondansetron (ZOFRAN-ODT) 4 MG TbDL Take 2 tablets (8 mg total) by mouth nightly as needed (nausea).    oxyCODONE-acetaminophen (PERCOCET) 7.5-325 mg per tablet Take 1 tablet by mouth every 24 hours as needed for Pain.    pregabalin (LYRICA) 75 MG capsule Take 1 capsule (75 mg total) by mouth nightly as needed (sciatica).    PROAIR HFA 90 mcg/actuation inhaler Inhale 2 puffs into the lungs every 4 (four) hours as needed.     Family History       Problem Relation (Age of Onset)    Arthritis Brother    Asthma Father    Heart disease Brother          Tobacco Use    Smoking status: Former     Types: Cigarettes    Smokeless tobacco: Never   Substance and Sexual Activity    Alcohol use: Not Currently    Drug use: Never    Sexual activity: Not on file     Review of Systems   Constitutional:  Negative for chills, fatigue and fever.   Respiratory:  Negative for cough and shortness of breath.     Cardiovascular:  Negative for chest pain and leg swelling.   Gastrointestinal:  Positive for abdominal pain, nausea and vomiting.   Skin:  Negative for color change and wound.   Neurological:  Negative for weakness.   Objective:     Vital Signs (Most Recent):  Temp: 97.7 °F (36.5 °C) (09/12/22 1232)  Pulse: 92 (09/12/22 1456)  Resp: 18 (09/12/22 1322)  BP: (!) 156/70 (09/12/22 1322)  SpO2: 98 % (09/12/22 1456)   Vital Signs (24h Range):  Temp:  [97.7 °F (36.5 °C)] 97.7 °F (36.5 °C)  Pulse:  [] 92  Resp:  [18-22] 18  SpO2:  [98 %] 98 %  BP: (156-177)/(70-94) 156/70     Weight: 54.1 kg (119 lb 4.3 oz)  Body mass index is 19.25 kg/m².    Physical Exam  Constitutional:       General: She is not in acute distress.     Appearance: She is not ill-appearing or toxic-appearing.   Eyes:      Extraocular Movements: Extraocular movements intact.   Cardiovascular:      Rate and Rhythm: Normal rate and regular rhythm.      Heart sounds: No murmur heard.  Pulmonary:      Effort: No respiratory distress.      Breath sounds: No wheezing or rales.   Abdominal:      General: Abdomen is flat.      Palpations: Abdomen is soft. There is no mass.      Tenderness: There is abdominal tenderness (to deep palpation of LUQ and epigatric area).      Hernia: No hernia is present.      Comments: Obese abdomen. Pt refused FRANKY   Musculoskeletal:      Right lower leg: No edema.      Left lower leg: No edema.   Skin:     General: Skin is warm and dry.      Coloration: Skin is not jaundiced or pale.      Findings: No rash.   Neurological:      Mental Status: She is alert and oriented to person, place, and time.           Significant Labs:     Bilirubin:   Recent Labs   Lab 09/12/22  1332   BILIDIR 1.1*   BILITOT 1.9*     Blood Culture: PENDING    CBC:   Recent Labs   Lab 09/12/22  1332   WBC 20.7*   HGB 12.6   HCT 40.2        CMP:   Recent Labs   Lab 09/12/22  1332      K 4.1   CO2 25   BUN 11.7   CREATININE 0.60   CALCIUM 9.3    ALBUMIN 3.4   BILITOT 1.9*   ALKPHOS 154*   AST 1,085*   *     Magnesium:   Recent Labs   Lab 09/12/22  1332   MG 1.80     POCT Glucose:   Recent Labs   Lab 09/12/22  1309   POCTGLUCOSE 146*     Urine Culture: PENDING    Urine Studies:   Recent Labs   Lab 09/12/22  1414   APPEARANCEUA Clear   PROTEINUA Negative   BILIRUBINUA Negative   UROBILINOGEN 1+*   LEUKOCYTESUR Negative   RBCUA 0-5   WBCUA 0-5   BACTERIA None Seen   HYALINECASTS None Seen     Hepatitis Panel:  Lab Results   Component Value Date    HEPAIGM Nonreactive 09/12/2022    HEPBCOREM Nonreactive 09/12/2022    HEPBSURFAG Nonreactive 09/12/2022    HEPCAB Nonreactive 09/12/2022     Phosphorus:  Lab Results   Component Value Date    PHOS 1.4 (L) 09/12/2022     UDS:   Component Ref Range & Units 14:14 2 mo ago   Amphetamines, Urine Negative Negative  Negative    Barbituates, Urine Negative Negative  Negative    Benzodiazepine, Urine Negative Negative  Negative    Cannabinoids, Urine Negative Positive Abnormal   Negative    Cocaine, Urine Negative Negative  Negative    Fentanyl, Urine Negative Negative  Negative    MDMA, Urine Negative Negative  Negative    Opiates, Urine Negative Negative  Negative    Phencyclidine, Urine Negative Negative  Negative    pH, Urine 3.0 - 11.0 7.5  7.0    Specific Gravity, Urine Auto 1.001 - 1.035 1.018         Significant Imaging:     US Abdomen Limited   Impression:     Gallbladder not visualized.  Borderline dilatation of the common bile duct for a presumably post cholecystectomy patient.  There is underlying pneumobilia, but a 12 mm echogenic area in the distal common bile duct may be choledocholithiasis.  If needed, CT can be considered for further assessment.  The pneumobilia may significantly limit the quality of any MRCP images.        Electronically signed by: Jostin Scott  Date:                                            09/12/2022  Time:                                            18:34    ----------------------------------------------------------------------------    X-Ray Abdomen AP 1 View  FINDINGS:  Examination reveals residual feces throughout the colon gas pattern is nonspecific with no clear evidence of ileus or obstruction no abnormal masses or calcifications identified     Impression:     Residual feces.     Nonspecific gas pattern        Electronically signed by: Syed Crandall  Date:                                            09/12/2022  Time:                                           15:09    ----------------------------------------------------------------------------    X-Ray Chest 1 View  FINDINGS:  The heart is normal in size.  The lungs are clear without focal consolidation.  There is no pleural effusion or visible pneumothorax.     Impression:     No acute abnormality of the chest.        Electronically signed by: Aziza Barry  Date:                                            09/12/2022  Time:                                           14:41  Assessment/Plan:     * Transaminitis  - AST: 1085; ALT: 628, not previously elevated on chart review  - Hepatitis panel negative   - APTT, PT/INR, GGT pending  - Repeat CMP and CBC 9/13 AM  - CT abdomen and pelvis pending   - etiology unclear at this time      Hypophosphatemia  - phosphate 1.4,  repleting phos      Systemic inflammatory response syndrome (SIRS)  - WBC: 20.7, tachypneic on arrival at 22, tachycardic on arrival at 111bpm, (3/4 SIRS), no clear source of infection at this time  - received one dose of cipro, flagyl in ED, zosyn initiated   - blood culture pending   - urine culture pending   - pt received 1L bolus of NS in ED, continue fluid resuscitation of LR @ 100mL/hr.      Hypertension  - continue home dose of HCTZ 12.5 daily       Hiatal hernia  - consider consult to GI, pending CT abdomen and pelvis results.       Gastroesophageal reflux disease  - pt already on PPI, medication not on formulary, start Pantoprazole  40 daily       VTE Risk Mitigation (From admission, onward)           Ordered     Place sequential compression device  Until discontinued         09/12/22 2043     IP VTE LOW RISK PATIENT  Once         09/12/22 2043                    CODE STATUS: Partial, DNI  Access: pIV  Antibiotics: zosyn (started 9/12)  Diet: NPO  DVT Prophylaxis: SCD  GI Prophylaxis: protonix 40  Fluids: LR @ 100mL/hr for SIRS      Disposition: admitted to inpatient and telemetry for transaminitis       Hellen Andersen MD -I  Department of Hospital Medicine   [facility]

## 2022-09-13 NOTE — SUBJECTIVE & OBJECTIVE
Past Medical History:   Diagnosis Date    Asthma     GERD (gastroesophageal reflux disease)     Hypertension     Lumbago     Sciatica        Past Surgical History:   Procedure Laterality Date    APPENDECTOMY       SECTION      COLONOSCOPY      ESOPHAGOGASTRECTOMY      HYSTERECTOMY      POLYPECTOMY      RHINOPLASTY      TONSILLECTOMY         Review of patient's allergies indicates:   Allergen Reactions    Amoxicillin-pot clavulanate Hives     Other reaction(s): Swelling    Iodine and iodide containing products      Other reaction(s): Burning sensation    Sulfamethoxazole-trimethoprim      Other reaction(s): Constipation, Dizziness, Loss of appetite, Rapid heart beat, Shaking       No current facility-administered medications on file prior to encounter.     Current Outpatient Medications on File Prior to Encounter   Medication Sig    alendronate (FOSAMAX) 10 MG Tab Take 10 mg by mouth once daily.    cyclobenzaprine (FLEXERIL) 10 MG tablet Take 1 tablet (10 mg total) by mouth 3 (three) times daily as needed for Muscle spasms.    hydroCHLOROthiazide (HYDRODIURIL) 12.5 MG Tab Take 1 tablet (12.5 mg total) by mouth once daily.    omeprazole (PRILOSEC) 20 MG capsule Take 1 capsule (20 mg total) by mouth once daily.    ondansetron (ZOFRAN-ODT) 4 MG TbDL Take 2 tablets (8 mg total) by mouth nightly as needed (nausea).    oxyCODONE-acetaminophen (PERCOCET) 7.5-325 mg per tablet Take 1 tablet by mouth every 24 hours as needed for Pain.    pregabalin (LYRICA) 75 MG capsule Take 1 capsule (75 mg total) by mouth nightly as needed (sciatica).    PROAIR HFA 90 mcg/actuation inhaler Inhale 2 puffs into the lungs every 4 (four) hours as needed.     Family History       Problem Relation (Age of Onset)    Arthritis Brother    Asthma Father    Heart disease Brother          Tobacco Use    Smoking status: Former     Types: Cigarettes    Smokeless tobacco: Never   Substance and Sexual Activity    Alcohol use: Not Currently    Drug  use: Never    Sexual activity: Not on file     Review of Systems   Constitutional:  Negative for chills, fatigue and fever.   Respiratory:  Negative for cough and shortness of breath.    Cardiovascular:  Negative for chest pain and leg swelling.   Gastrointestinal:  Positive for abdominal pain, nausea and vomiting.   Skin:  Negative for color change and wound.   Neurological:  Negative for weakness.   Objective:     Vital Signs (Most Recent):  Temp: 97.7 °F (36.5 °C) (09/12/22 1232)  Pulse: 92 (09/12/22 1456)  Resp: 18 (09/12/22 1322)  BP: (!) 156/70 (09/12/22 1322)  SpO2: 98 % (09/12/22 1456)   Vital Signs (24h Range):  Temp:  [97.7 °F (36.5 °C)] 97.7 °F (36.5 °C)  Pulse:  [] 92  Resp:  [18-22] 18  SpO2:  [98 %] 98 %  BP: (156-177)/(70-94) 156/70     Weight: 54.1 kg (119 lb 4.3 oz)  Body mass index is 19.25 kg/m².    Physical Exam  Constitutional:       General: She is not in acute distress.     Appearance: She is not ill-appearing or toxic-appearing.   Eyes:      Extraocular Movements: Extraocular movements intact.   Cardiovascular:      Rate and Rhythm: Normal rate and regular rhythm.      Heart sounds: No murmur heard.  Pulmonary:      Effort: No respiratory distress.      Breath sounds: No wheezing or rales.   Abdominal:      General: Abdomen is flat.      Palpations: Abdomen is soft. There is no mass.      Tenderness: There is abdominal tenderness (to deep palpation of LUQ and epigatric area).      Hernia: No hernia is present.      Comments: Obese abdomen. Pt refused FRANKY   Musculoskeletal:      Right lower leg: No edema.      Left lower leg: No edema.   Skin:     General: Skin is warm and dry.      Coloration: Skin is not jaundiced or pale.      Findings: No rash.   Neurological:      Mental Status: She is alert and oriented to person, place, and time.           Significant Labs:     Bilirubin:   Recent Labs   Lab 09/12/22  1332   BILIDIR 1.1*   BILITOT 1.9*     Blood Culture: PENDING    CBC:   Recent  Labs   Lab 09/12/22  1332   WBC 20.7*   HGB 12.6   HCT 40.2        CMP:   Recent Labs   Lab 09/12/22  1332      K 4.1   CO2 25   BUN 11.7   CREATININE 0.60   CALCIUM 9.3   ALBUMIN 3.4   BILITOT 1.9*   ALKPHOS 154*   AST 1,085*   *     Magnesium:   Recent Labs   Lab 09/12/22  1332   MG 1.80     POCT Glucose:   Recent Labs   Lab 09/12/22  1309   POCTGLUCOSE 146*     Urine Culture: PENDING    Urine Studies:   Recent Labs   Lab 09/12/22  1414   APPEARANCEUA Clear   PROTEINUA Negative   BILIRUBINUA Negative   UROBILINOGEN 1+*   LEUKOCYTESUR Negative   RBCUA 0-5   WBCUA 0-5   BACTERIA None Seen   HYALINECASTS None Seen     Hepatitis Panel:  Lab Results   Component Value Date    HEPAIGM Nonreactive 09/12/2022    HEPBCOREM Nonreactive 09/12/2022    HEPBSURFAG Nonreactive 09/12/2022    HEPCAB Nonreactive 09/12/2022     Phosphorus:  Lab Results   Component Value Date    PHOS 1.4 (L) 09/12/2022     UDS:   Component Ref Range & Units 14:14 2 mo ago   Amphetamines, Urine Negative Negative  Negative    Barbituates, Urine Negative Negative  Negative    Benzodiazepine, Urine Negative Negative  Negative    Cannabinoids, Urine Negative Positive Abnormal   Negative    Cocaine, Urine Negative Negative  Negative    Fentanyl, Urine Negative Negative  Negative    MDMA, Urine Negative Negative  Negative    Opiates, Urine Negative Negative  Negative    Phencyclidine, Urine Negative Negative  Negative    pH, Urine 3.0 - 11.0 7.5  7.0    Specific Gravity, Urine Auto 1.001 - 1.035 1.018         Significant Imaging:     US Abdomen Limited   Impression:     Gallbladder not visualized.  Borderline dilatation of the common bile duct for a presumably post cholecystectomy patient.  There is underlying pneumobilia, but a 12 mm echogenic area in the distal common bile duct may be choledocholithiasis.  If needed, CT can be considered for further assessment.  The pneumobilia may significantly limit the quality of any MRCP images.         Electronically signed by: Jostin Scott  Date:                                            09/12/2022  Time:                                           18:34    ----------------------------------------------------------------------------    X-Ray Abdomen AP 1 View  FINDINGS:  Examination reveals residual feces throughout the colon gas pattern is nonspecific with no clear evidence of ileus or obstruction no abnormal masses or calcifications identified     Impression:     Residual feces.     Nonspecific gas pattern        Electronically signed by: Syed Crandall  Date:                                            09/12/2022  Time:                                           15:09    ----------------------------------------------------------------------------    X-Ray Chest 1 View  FINDINGS:  The heart is normal in size.  The lungs are clear without focal consolidation.  There is no pleural effusion or visible pneumothorax.     Impression:     No acute abnormality of the chest.        Electronically signed by: Aziza Barry  Date:                                            09/12/2022  Time:                                           14:41

## 2022-09-13 NOTE — PLAN OF CARE
09/13/22 1444   Discharge Assessment   Assessment Type Discharge Planning Assessment   Confirmed/corrected address, phone number and insurance Yes   Confirmed Demographics Correct on Facesheet   Source of Information patient   Reason For Admission Transaminitis   Lives With spouse   Do you expect to return to your current living situation? Yes   Do you have help at home or someone to help you manage your care at home? Yes   Who are your caregiver(s) and their phone number(s)? Iván, spouse, [phone]   Prior to hospitilization cognitive status: No Deficits;Alert/Oriented   Current cognitive status: Alert/Oriented;No Deficits   Walking or Climbing Stairs Difficulty ambulation difficulty, requires equipment   Dressing/Bathing Difficulty none   Home Accessibility not wheelchair accessible;stairs to enter home   Number of Stairs, Main Entrance four   Stair Railings, Main Entrance none   Landing, Stairs, Main Entrance adequate turning radius   Home Layout Able to live on 1st floor   Equipment Currently Used at Home cane, straight   Readmission within 30 days? No   Patient currently being followed by outpatient case management? No   Do you currently have service(s) that help you manage your care at home? No   Do you take prescription medications? Yes   Do you have prescription coverage? No   Do you have any problems affording any of your prescribed medications? TBD   Is the patient taking medications as prescribed? yes   Who is going to help you get home at discharge? spouse   How do you get to doctors appointments? car, drives self   Are you on dialysis? No   Do you take coumadin? No   Discharge Plan A Home with family   DME Needed Upon Discharge  none   Discharge Plan discussed with: Patient   Discharge Barriers Identified None   Relationship/Environment   Name(s) of Who Lives With Patient Iván   Pt is currently Medicaid pending.

## 2022-09-13 NOTE — PROGRESS NOTES
"[facility] Wards Progress Note     Resident Team: [facility]  Attending Physician: Steve Beach, *  Resident: Dr. Maureen Escobedo       Subjective:      Brief HPI:  Ms. Stallings is a 63 yo F w/ PMHx including HTN, HLD, Hiatal Hernia, GERD, Rectal Mass w/ High Grade Dysplasia, Osteoporosis, Asthma, Tobacco Use, Sciatica, Depression, Hx of R Lung Nodule,  Hx of Mucinous Ovarian Cystadenoma s/p DARINEL BSO who presented to [facility] ED on  w/ epigastric and LUQ pain.     Interval History: NAEON. AF. Hypertensive in systolic 140s-180s. VSOS. Reports persistence of LUQ abdominal soreness. Denies f/c, CP, SOB, light-headedness/dizziness,  dysuria, constipation, diarrhea, BRBPR, rectal pain/discomfort, myalgias. Not ambulating much in room. NPO pending GI eval. Voiding spontaneously.      Review of Systems:  As noted in HPI      Objective:     Last 24 Hour Vital Signs:  BP  Min: 130/73  Max: 187/76  Temp  Av.1 °F (36.7 °C)  Min: 97.7 °F (36.5 °C)  Max: 98.3 °F (36.8 °C)  Pulse  Av.5  Min: 85  Max: 111  Resp  Av  Min: 18  Max: 22  SpO2  Av.3 %  Min: 95 %  Max: 99 %  Height  Av' 6" (167.6 cm)  Min: 5' 6" (167.6 cm)  Max: 5' 6" (167.6 cm)  Weight  Av.1 kg (119 lb 4.3 oz)  Min: 54.1 kg (119 lb 4.3 oz)  Max: 54.1 kg (119 lb 4.3 oz)  No intake/output data recorded.    Physical Examination:  Gen: In NAD, resting in bed   HEENT: PEERLA, EOMI, No scleral icterus   Neck: FROM, no thyromegally   Heart: RRR, No MRG   Lungs: CTAB, NLB   Abd: S/Mild TTP in epigastric and LUQ region /ND/+normoactive BS. No guarding or rebound tenderness.   MSK: 5/5 strength bilat upper and lower extremity, FROM bilat upper and lower extremity   Neuro: CN II-XII intact grossly   Skin: No Juandice. Warm and Dry. Well perfused.     Laboratory:  Most Recent Data:  CBC:   Lab Results   Component Value Date    WBC 20.3 (H) 2022    HGB 11.3 (L) 2022    HCT 34.5 (L) 2022     2022 "    MCV 90.6 09/13/2022    RDW 14.4 09/13/2022     WBC Differential:   Recent Labs   Lab 09/12/22  1332 09/13/22  0400   WBC 20.7* 20.3*   HGB 12.6 11.3*   HCT 40.2 34.5*    260   MCV 93.9 90.6     BMP:   Lab Results   Component Value Date     09/13/2022    K 3.5 09/13/2022    CO2 23 09/13/2022    BUN 9.2 (L) 09/13/2022    CREATININE 0.60 09/13/2022    CALCIUM 8.5 09/13/2022    MG 1.80 09/13/2022    PHOS 2.0 (L) 09/13/2022     LFTs:   Lab Results   Component Value Date    ALBUMIN 2.9 (L) 09/13/2022    BILITOT 4.1 (H) 09/13/2022     (H) 09/13/2022    ALKPHOS 155 (H) 09/13/2022     (H) 09/13/2022     (H) 09/13/2022     Coags:   Lab Results   Component Value Date    INR 1.09 09/13/2022    PROTIME 13.9 09/13/2022    PTT 30.4 09/13/2022     FLP:   Lab Results   Component Value Date    CHOL 240 (H) 08/24/2021    HDL 47 08/24/2021    TRIG 168 (H) 08/24/2021     DM:   Lab Results   Component Value Date    CREATININE 0.60 09/13/2022     Thyroid:   Lab Results   Component Value Date    TSH 0.1326 (L) 08/24/2021      Anemia: No results found for: IRON, TIBC, FERRITIN, SATURATEDIRO  No results found for: [id number]  No results found for: FOLATE     Cardiac: No results found for: TROPONINI, CKTOTAL, CKMB, BNP      Microbiology Data:  Microbiology Results (last 7 days)       Procedure Component Value Units Date/Time    Blood Culture #1 **CANNOT BE ORDERED STAT** [[id number]] Collected: 09/12/22 1640    Order Status: Resulted Specimen: Blood Updated: 09/12/22 1640    Blood Culture #2 **CANNOT BE ORDERED STAT** [[id number]] Collected: 09/12/22 1541    Order Status: Resulted Specimen: Blood Updated: 09/12/22 1541             Other Results:  EKG (my interpretation): No new EKG.     Radiology:  Imaging Results              CT Abdomen Pelvis  Without Contrast (Final result)  Result time 09/12/22 22:06:12      Final result by Reza Mariano MD (09/12/22 22:06:12)                   Impression:       Pneumobilia.    Hiatal hernia.      Electronically signed by: Reza Mariano MD  Date:    09/12/2022  Time:    22:06               Narrative:    EXAMINATION:  CT ABDOMEN PELVIS WITHOUT CONTRAST    CLINICAL HISTORY:  Abdominal abscess/infection suspected;Abdominal pain, acute, nonlocalized;    TECHNIQUE:  Low dose axial images, sagittal and coronal reformations were obtained from the lung bases to the pubic symphysis.  No oral contrast was given.    Automatic exposure control (AEC) was utilized for dose reduction.    Dose: 226 mGycm    COMPARISON:  02/18/2022    FINDINGS:  There is a hiatal hernia present.  There is pneumobilia.  Spleen appears normal.  Pancreas appears normal.  The adrenals are not enlarged.  Kidneys appear normal.  Aorta shows calcification without an aneurysm present no distal ureteral lithiasis is seen there degenerative changes at L5-S1.  The appendix is not identified.    There are diverticulum in the colon without evidence of acute diverticulitis                                       US Abdomen Limited (Final result)  Result time 09/12/22 18:34:42      Final result by Jostin Scott MD (09/12/22 18:34:42)                   Impression:      Gallbladder not visualized.  Borderline dilatation of the common bile duct for a presumably post cholecystectomy patient.  There is underlying pneumobilia, but a 12 mm echogenic area in the distal common bile duct may be choledocholithiasis.  If needed, CT can be considered for further assessment.  The pneumobilia may significantly limit the quality of any MRCP images.      Electronically signed by: Jostin Scott  Date:    09/12/2022  Time:    18:34               Narrative:    EXAMINATION:  US ABDOMEN LIMITED    CLINICAL HISTORY:  hepatitis;    TECHNIQUE:  Gray-scale and color Doppler ultrasound images of the abdomen.    COMPARISON:  Ultrasound 03/04/2022    CT abdomen/pelvis 02/18/2022    FINDINGS:  No pancreatic abnormality appreciated.    Main portal  vein patent with normal flow direction.  Normal caliber of the superior IVC.    Cirrhotic liver morphology.  Underlying pneumobilia.  Gallbladder not seen.  Oval echogenic area in the distal portion of the common bile duct near the pancreatic head measures 12 mm with posterior shadowing.  No significant ascites.    No hydronephrosis or defined calcification in the right kidney.    Measurements:    - Liver: 14.4 cm    - CBD diameter: 10 mm    - Right kidney: 10.1 cm in length                                       X-Ray Abdomen AP 1 View (Final result)  Result time 09/12/22 15:09:40      Final result by Syed Crandall MD (09/12/22 15:09:40)                   Impression:      Residual feces.    Nonspecific gas pattern      Electronically signed by: Syed Crandall  Date:    09/12/2022  Time:    15:09               Narrative:    EXAMINATION:  XR ABDOMEN AP 1 VIEW    CLINICAL HISTORY:  abdominal discomfort;    TECHNIQUE:  AP X-RAY OF THE ABDOMEN:    CPT 85710    FINDINGS:  Examination reveals residual feces throughout the colon gas pattern is nonspecific with no clear evidence of ileus or obstruction no abnormal masses or calcifications identified                                       X-Ray Chest 1 View (Final result)  Result time 09/12/22 14:41:09      Final result by Aziza Barry MD (09/12/22 14:41:09)                   Impression:      No acute abnormality of the chest.      Electronically signed by: Aziza Barry  Date:    09/12/2022  Time:    14:41               Narrative:    EXAMINATION:  XR CHEST 1 VIEW    CLINICAL HISTORY:  Unspecified abdominal pain    TECHNIQUE:  AP chest    COMPARISON:  None.    FINDINGS:  The heart is normal in size.  The lungs are clear without focal consolidation.  There is no pleural effusion or visible pneumothorax.                                      Current Medications:     Infusions:   lactated ringers 100 mL/hr at 09/13/22 0909        Scheduled:   hydroCHLOROthiazide   12.5 mg Oral Daily    iopamidoL        iopamidoL        pantoprozole (PROTONIX) IV  40 mg Intravenous Daily    piperacillin-tazobactam (ZOSYN) IVPB  4.5 g Intravenous Q8H        PRN:  ibuprofen, indomethacin, sodium chloride 0.9%    Antibiotics and Day Number of Therapy:  Zosyn (Day 1)    Lines and Day Number of Therapy:  PIV (Day 2)     Assessment & Plan:   CBD Dilation to 10 mm w/ 12 mm Echogenic Focus w/in Distal CBD   Pneumobilia, chronic   Elevated Bilirubin   Elevated AST and ALT   Elevated ALK Phos   -GI consulted. Will f/u recs. NPO, will need ERCP.  -Pt declines ever having cholecystectomy, though gall bladder rarely visualized on oupt imaging.   -Continue IV Zosyn in setting of leukocytosis.   -PRN Pain Control     Sepsis (SIRS III/IV though transient) w/ suspected intraabdominal source   -Leukocytosis Persists this AM, stable. Continue IV Zosyn as noted above. PT w/o clinical decline otherwise.    -Will f/u Blood Cultures, no results currently.     Hypophosphatemia:   1.4 on admit and 2.0 this AM, needs repletion post ERCP.     Rectal Mass   -Pt continues to deny intervention. CT Abd/Pelvis done w/o contrast 2/2 iodine allergy and rectal exam deferred. No signs currently that rectal mass is direct cause of acute presentation.     Hiatal Hernia   GERD   -Continue Protonix while inpt, pt takes PPI at home.   -Continue oupt follow up as scheduled.     Sciatica:   Holding home Lyrica, it is prescribed PRN and pt unable to list home medications.     Osteoporosis:   Holding home Alendronate at this time.     Tobacco Use:   Consider Nicotine patch while inpt if pt amenable.     Asthma:   PRN Albuterol for wheezing/SOB     CODE STATUS: DNI   Access: PIV   Antibiotics: IV Zosyn   Diet: NPO   DVT Prophylaxis: Will order Lovenox 40 mg qd   GI Prophylaxis: On home PPI   Fluids: 100 ccs/LR     Disposition: Pending Course       Maureen Escobedo MD  [facility]    Addendum:  Agree with above assessment  and plan.  Patient reports that she had severe abdominal pain that after going to bed that placed her in a fetal position.  Endorsed vomiting when the EMS arrived.  Denied any fever.  States that she has a court date on the 9/20th that she can't miss.  She admits to feeling depressed but denies SI/HI.  She is amenable to starting an antidepressant. States feels better after the ERCP    General: laying supine in bed with no acute distress  Cardiovascular: RRR, no gallop nor rub heard  Respiratory: CTAB  Gastrointestinal: Soft, nontender, active bowel sounds  Neurological: alert, awake, normal speech     Vitals, labs and imaging reviewed    Assessment  Depression  S/p ERCP with stent placement 2/2 Choledocholithiasis with cholangitis    Plan  Consider starting Lexapro 10 mg daily   Continue antibiotics per GI recommendations    Kyra Swartz MD, PGY-2  [facility]

## 2022-09-14 LAB
ALBUMIN SERPL-MCNC: 2.7 GM/DL (ref 3.4–4.8)
ALBUMIN/GLOB SERPL: 1 RATIO (ref 1.1–2)
ALP SERPL-CCNC: 143 UNIT/L (ref 40–150)
ALT SERPL-CCNC: 227 UNIT/L (ref 0–55)
AST SERPL-CCNC: 96 UNIT/L (ref 5–34)
BILIRUBIN DIRECT+TOT PNL SERPL-MCNC: 1.2 MG/DL (ref 0–0.5)
BILIRUBIN DIRECT+TOT PNL SERPL-MCNC: 1.9 MG/DL
BUN SERPL-MCNC: 8.7 MG/DL (ref 9.8–20.1)
CALCIUM SERPL-MCNC: 8.9 MG/DL (ref 8.4–10.2)
CHLORIDE SERPL-SCNC: 106 MMOL/L (ref 98–107)
CO2 SERPL-SCNC: 26 MMOL/L (ref 23–31)
CREAT SERPL-MCNC: 0.63 MG/DL (ref 0.55–1.02)
ERYTHROCYTE [DISTWIDTH] IN BLOOD BY AUTOMATED COUNT: 14.4 % (ref 11.5–17)
GFR SERPLBLD CREATININE-BSD FMLA CKD-EPI: >60 MLS/MIN/1.73/M2
GLOBULIN SER-MCNC: 2.8 GM/DL (ref 2.4–3.5)
GLUCOSE SERPL-MCNC: 132 MG/DL (ref 82–115)
HCT VFR BLD AUTO: 32.9 % (ref 37–47)
HGB BLD-MCNC: 10.3 GM/DL (ref 12–16)
MAGNESIUM SERPL-MCNC: 1.8 MG/DL (ref 1.6–2.6)
MCH RBC QN AUTO: 28.7 PG (ref 27–31)
MCHC RBC AUTO-ENTMCNC: 31.3 MG/DL (ref 33–36)
MCV RBC AUTO: 91.6 FL (ref 80–94)
NRBC BLD AUTO-RTO: 0 %
PHOSPHATE SERPL-MCNC: 3.2 MG/DL (ref 2.3–4.7)
PLATELET # BLD AUTO: 230 X10(3)/MCL (ref 130–400)
PMV BLD AUTO: 10 FL (ref 7.4–10.4)
POTASSIUM SERPL-SCNC: 4 MMOL/L (ref 3.5–5.1)
PROT SERPL-MCNC: 5.5 GM/DL (ref 5.8–7.6)
RBC # BLD AUTO: 3.59 X10(6)/MCL (ref 4.2–5.4)
SODIUM SERPL-SCNC: 139 MMOL/L (ref 136–145)
WBC # SPEC AUTO: 13 X10(3)/MCL (ref 4.5–11.5)

## 2022-09-14 PROCEDURE — 36415 COLL VENOUS BLD VENIPUNCTURE: CPT | Performed by: STUDENT IN AN ORGANIZED HEALTH CARE EDUCATION/TRAINING PROGRAM

## 2022-09-14 PROCEDURE — 80053 COMPREHEN METABOLIC PANEL: CPT | Performed by: STUDENT IN AN ORGANIZED HEALTH CARE EDUCATION/TRAINING PROGRAM

## 2022-09-14 PROCEDURE — 99900035 HC TECH TIME PER 15 MIN (STAT)

## 2022-09-14 PROCEDURE — 85027 COMPLETE CBC AUTOMATED: CPT | Performed by: STUDENT IN AN ORGANIZED HEALTH CARE EDUCATION/TRAINING PROGRAM

## 2022-09-14 PROCEDURE — 84100 ASSAY OF PHOSPHORUS: CPT | Performed by: STUDENT IN AN ORGANIZED HEALTH CARE EDUCATION/TRAINING PROGRAM

## 2022-09-14 PROCEDURE — 21400001 HC TELEMETRY ROOM

## 2022-09-14 PROCEDURE — C9113 INJ PANTOPRAZOLE SODIUM, VIA: HCPCS | Performed by: STUDENT IN AN ORGANIZED HEALTH CARE EDUCATION/TRAINING PROGRAM

## 2022-09-14 PROCEDURE — 82248 BILIRUBIN DIRECT: CPT | Performed by: STUDENT IN AN ORGANIZED HEALTH CARE EDUCATION/TRAINING PROGRAM

## 2022-09-14 PROCEDURE — 63600175 PHARM REV CODE 636 W HCPCS: Performed by: STUDENT IN AN ORGANIZED HEALTH CARE EDUCATION/TRAINING PROGRAM

## 2022-09-14 PROCEDURE — 83735 ASSAY OF MAGNESIUM: CPT | Performed by: STUDENT IN AN ORGANIZED HEALTH CARE EDUCATION/TRAINING PROGRAM

## 2022-09-14 PROCEDURE — 94761 N-INVAS EAR/PLS OXIMETRY MLT: CPT

## 2022-09-14 PROCEDURE — 25000003 PHARM REV CODE 250: Performed by: STUDENT IN AN ORGANIZED HEALTH CARE EDUCATION/TRAINING PROGRAM

## 2022-09-14 RX ORDER — AMLODIPINE BESYLATE 10 MG/1
10 TABLET ORAL DAILY
Status: DISCONTINUED | OUTPATIENT
Start: 2022-09-14 | End: 2022-09-18 | Stop reason: HOSPADM

## 2022-09-14 RX ORDER — HYDRALAZINE HYDROCHLORIDE 20 MG/ML
10 INJECTION INTRAMUSCULAR; INTRAVENOUS EVERY 6 HOURS PRN
Status: DISCONTINUED | OUTPATIENT
Start: 2022-09-14 | End: 2022-09-18 | Stop reason: HOSPADM

## 2022-09-14 RX ORDER — LABETALOL HCL 20 MG/4 ML
10 SYRINGE (ML) INTRAVENOUS EVERY 6 HOURS PRN
Status: DISCONTINUED | OUTPATIENT
Start: 2022-09-14 | End: 2022-09-18 | Stop reason: HOSPADM

## 2022-09-14 RX ADMIN — ENOXAPARIN SODIUM 40 MG: 40 INJECTION SUBCUTANEOUS at 05:09

## 2022-09-14 RX ADMIN — PANTOPRAZOLE SODIUM 40 MG: 40 INJECTION, POWDER, FOR SOLUTION INTRAVENOUS at 09:09

## 2022-09-14 RX ADMIN — PIPERACILLIN AND TAZOBACTAM 4.5 G: 4; .5 INJECTION, POWDER, LYOPHILIZED, FOR SOLUTION INTRAVENOUS; PARENTERAL at 06:09

## 2022-09-14 RX ADMIN — PIPERACILLIN AND TAZOBACTAM 4.5 G: 4; .5 INJECTION, POWDER, LYOPHILIZED, FOR SOLUTION INTRAVENOUS; PARENTERAL at 02:09

## 2022-09-14 RX ADMIN — HYDROCHLOROTHIAZIDE 12.5 MG: 12.5 TABLET ORAL at 09:09

## 2022-09-14 RX ADMIN — SODIUM CHLORIDE, POTASSIUM CHLORIDE, SODIUM LACTATE AND CALCIUM CHLORIDE: 600; 310; 30; 20 INJECTION, SOLUTION INTRAVENOUS at 10:09

## 2022-09-14 RX ADMIN — AMLODIPINE BESYLATE 10 MG: 10 TABLET ORAL at 05:09

## 2022-09-14 RX ADMIN — PIPERACILLIN AND TAZOBACTAM 4.5 G: 4; .5 INJECTION, POWDER, LYOPHILIZED, FOR SOLUTION INTRAVENOUS; PARENTERAL at 09:09

## 2022-09-14 NOTE — CARE UPDATE
Note from 9/13/22:  Addendum:  Agree with above assessment and plan.  Patient reports that she had severe abdominal pain that after going to bed that placed her in a fetal position.  Endorsed vomiting when the EMS arrived.  Denied any fever.  States that she has a court date on the 9/20th that she can't miss.  She admits to feeling depressed but denies SI/HI.  She is amenable to starting an antidepressant. States feels better after the ERCP     General: laying supine in bed with no acute distress  Cardiovascular: RRR, no gallop nor rub heard  Respiratory: CTAB  Gastrointestinal: Soft, nontender, active bowel sounds  Neurological: alert, awake, normal speech     Vitals, labs and imaging reviewed    Assessment  Depression  S/p ERCP with stent placement 2/2 Choledocholithiasis with cholangitis    Plan  Consider starting Lexapro 10 mg daily   Continue antibiotics per GI recommendations     Kyra Swartz MD, PGY-2  [facility]

## 2022-09-14 NOTE — PROGRESS NOTES
Gastroenterology/Hepatology Progress Note    Patient Name: Lore Stallings  Age: 62 y.o.  : 1960  MRN: [id number]  Admission Date: 2022      Self, Aaareferral    SUBJECTIVE:      No new events overnight.     Review of patient's allergies indicates:   Allergen Reactions    Amoxicillin-pot clavulanate Hives     Other reaction(s): Swelling    Iodine and iodide containing products      Other reaction(s): Burning sensation    Sulfamethoxazole-trimethoprim      Other reaction(s): Constipation, Dizziness, Loss of appetite, Rapid heart beat, Shaking          INPATIENT MEDICATIONS    Scheduled Meds:   enoxaparin  40 mg Subcutaneous Daily    hydroCHLOROthiazide  12.5 mg Oral Daily    pantoprozole (PROTONIX) IV  40 mg Intravenous Daily    piperacillin-tazobactam (ZOSYN) IVPB  4.5 g Intravenous Q8H     Continuous Infusions:   lactated ringers 100 mL/hr at 22 1022     PRN Meds:  albuterol sulfate, ibuprofen, indomethacin, sodium chloride 0.9%          Review of Systems:       Review of Systems   Constitutional:  Negative for appetite change and unexpected weight change.   HENT:  Negative for trouble swallowing.    Respiratory:  Negative for chest tightness.    Cardiovascular: Negative.    Gastrointestinal:  Negative for abdominal pain, change in bowel habit and change in bowel habit.   Musculoskeletal: Negative.    Neurological: Negative.    Psychiatric/Behavioral: Negative.     All other systems reviewed and are negative.       Objective:       VITAL SIGNS: 24 HR MIN & MAX LAST    Temp  Min: 97.2 °F (36.2 °C)  Max: 98.4 °F (36.9 °C)  97.6 °F (36.4 °C)        BP  Min: 116/68  Max: 179/66  (!) 149/67     Pulse  Min: 58  Max: 82  72     Resp  Min: 14  Max: 20  17    SpO2  Min: 95 %  Max: 100 %  97 %        Physical Exam  Constitutional:       Appearance: Normal appearance.   HENT:      Head: Normocephalic and atraumatic.      Mouth/Throat:      Mouth: Mucous membranes are moist.   Eyes:      Conjunctiva/sclera:  Conjunctivae normal.   Cardiovascular:      Rate and Rhythm: Normal rate and regular rhythm.   Pulmonary:      Effort: Pulmonary effort is normal.      Breath sounds: Normal breath sounds.   Abdominal:      General: Bowel sounds are normal.      Palpations: Abdomen is soft.   Musculoskeletal:         General: Normal range of motion.      Cervical back: Normal range of motion.   Skin:     General: Skin is warm.   Neurological:      General: No focal deficit present.      Mental Status: She is alert and oriented to person, place, and time. Mental status is at baseline.   Psychiatric:         Mood and Affect: Mood normal.         Behavior: Behavior normal.            LABS:    Recent Labs   Lab 09/12/22  1332 09/13/22  0400 09/14/22  0446   WBC 20.7* 20.3* 13.0*   HGB 12.6 11.3* 10.3*   HCT 40.2 34.5* 32.9*    260 230   MCV 93.9 90.6 91.6       Recent Labs   Lab 09/12/22  1332 09/13/22  0400 09/14/22  0446   HGB 12.6 11.3* 10.3*   HCT 40.2 34.5* 32.9*        Recent Labs   Lab 09/12/22  1332 09/13/22  0400 09/14/22  0446    141 139   K 4.1 3.5 4.0   CO2 25 23 26   BUN 11.7 9.2* 8.7*   CREATININE 0.60 0.60 0.63   BILITOT 1.9* 4.1* 1.9*   BILIDIR 1.1* 2.7* 1.2*   ALKPHOS 154* 155* 143   AST 1,085* 327* 96*   * 391* 227*   LABPROT 7.0 5.9 5.5*   ALBUMIN 3.4 2.9* 2.7*        Recent Labs   Lab 09/13/22  0400   INR 1.09   PROTIME 13.9   PTT 30.4        No results for input(s): IRON, FERRITIN in the last 168 hours.         IMAGING:   X-Ray Chest 1 View    Result Date: 9/12/2022  EXAMINATION: XR CHEST 1 VIEW CLINICAL HISTORY: Unspecified abdominal pain TECHNIQUE: AP chest COMPARISON: None. FINDINGS: The heart is normal in size.  The lungs are clear without focal consolidation.  There is no pleural effusion or visible pneumothorax.     No acute abnormality of the chest. Electronically signed by: Aziza Barry Date:    09/12/2022 Time:    14:41    X-Ray Knee 1 or 2 View Right    Result Date:  8/24/2022  EXAMINATION: XR KNEE 1 OR 2 VIEW RIGHT CLINICAL HISTORY: Unspecified fall, initial encounter COMPARISON: None. FINDINGS: No acute displaced fractures or dislocations. Joint spaces preserved. No blastic or lytic lesions. Soft tissues within normal limits.     No acute osseous abnormality. Electronically signed by: Syed Crandall Date:    08/24/2022 Time:    13:08    X-Ray Abdomen AP 1 View    Result Date: 9/12/2022  EXAMINATION: XR ABDOMEN AP 1 VIEW CLINICAL HISTORY: abdominal discomfort; TECHNIQUE: AP X-RAY OF THE ABDOMEN: CPT 87282 FINDINGS: Examination reveals residual feces throughout the colon gas pattern is nonspecific with no clear evidence of ileus or obstruction no abnormal masses or calcifications identified     Residual feces. Nonspecific gas pattern Electronically signed by: Syed Crandall Date:    09/12/2022 Time:    15:09    FL ERCP Biliary And Pancreatic By Rad Tech    Result Date: 9/13/2022  EXAMINATION: FL ERCP BILIARY AND PANCREATIC CLINICAL HISTORY: transaminitis; TECHNIQUE: Intra-operative fluoroscopy provided during ERCP.  Spot fluoroscopic images were uploaded into PACS.  Radiologist not present for exam.  Report for radiation documentation. FLUOROSCOPY TIME: 9.9 minutes FLUOROSCOPY DOSE: 161 mGy COMPARISON: none FINDINGS: ERCP images demonstrate cannulation of the common bile duct and contrast injection. Choledocholithiasis.  Placement of a common bile duct stent noted.     See operative report for details. Electronically signed by: Lucille Herrmann Date:    09/13/2022 Time:    14:12    US Abdomen Limited    Result Date: 9/12/2022  EXAMINATION: US ABDOMEN LIMITED CLINICAL HISTORY: hepatitis; TECHNIQUE: Gray-scale and color Doppler ultrasound images of the abdomen. COMPARISON: Ultrasound 03/04/2022 CT abdomen/pelvis 02/18/2022 FINDINGS: No pancreatic abnormality appreciated. Main portal vein patent with normal flow direction.  Normal caliber of the superior IVC. Cirrhotic liver  morphology.  Underlying pneumobilia.  Gallbladder not seen.  Oval echogenic area in the distal portion of the common bile duct near the pancreatic head measures 12 mm with posterior shadowing.  No significant ascites. No hydronephrosis or defined calcification in the right kidney. Measurements: - Liver: 14.4 cm - CBD diameter: 10 mm - Right kidney: 10.1 cm in length     Gallbladder not visualized.  Borderline dilatation of the common bile duct for a presumably post cholecystectomy patient.  There is underlying pneumobilia, but a 12 mm echogenic area in the distal common bile duct may be choledocholithiasis.  If needed, CT can be considered for further assessment.  The pneumobilia may significantly limit the quality of any MRCP images. Electronically signed by: Jostin Scott Date:    09/12/2022 Time:    18:34    CT Abdomen Pelvis  Without Contrast    Result Date: 9/12/2022  EXAMINATION: CT ABDOMEN PELVIS WITHOUT CONTRAST CLINICAL HISTORY: Abdominal abscess/infection suspected;Abdominal pain, acute, nonlocalized; TECHNIQUE: Low dose axial images, sagittal and coronal reformations were obtained from the lung bases to the pubic symphysis.  No oral contrast was given. Automatic exposure control (AEC) was utilized for dose reduction. Dose: 226 mGycm COMPARISON: 02/18/2022 FINDINGS: There is a hiatal hernia present.  There is pneumobilia.  Spleen appears normal.  Pancreas appears normal.  The adrenals are not enlarged.  Kidneys appear normal.  Aorta shows calcification without an aneurysm present no distal ureteral lithiasis is seen there degenerative changes at L5-S1.  The appendix is not identified. There are diverticulum in the colon without evidence of acute diverticulitis     Pneumobilia. Hiatal hernia. Electronically signed by: Reza Mariano MD Date:    09/12/2022 Time:    22:06        Assessment / Plan:     Lore Stallings is a 62 y.o. female admitted with choledocholithiasis and cholangitis.     Choledocholithiasis  with cholangitis:   - Elevated LFTs with pattern consistent with obstruction, dilated CBD  - ERCP with copious sludge and large stone with pus removed as well.  Difficulty to clear the CBD completely s/p stent   - Continue antibiotics for total of 5 days  - Repeat ERCP in 4-6 weeks for stent removal and clear remaining sludge  - No history of CCK despite nonvisualization of gallbladder on imaging, surgery consulted for further evaluation.   - LFTs downtrending  - OK from GI standpoint to proceed with any necessary intervention.  We will arrange follow-up.      Rectal polyp with HGD:   - Has apparently refused surgery.  We had a long discussion.  Will continue conversation  - Needs repeat endoscopy to re-evaluate polyp/mass     Pneumobilia:   - Present since at least 2019.  No prior known biliary intervention  - Unclear etiology              Iris Sandy MD, MPH  Gastroenterology and Hepatology   of Clinical Medicine  [facility]

## 2022-09-14 NOTE — PROGRESS NOTES
[facility] Family [facility] Wards Progress Note     Resident Team: [facility]  Attending Physician: Steve Beach, *  Resident: Dr. Maureen Escobedo       Subjective:      Brief HPI:  Ms. Stallings is a 63 yo F w/ PMHx including HTN, HLD, Hiatal Hernia, GERD, Rectal Mass w/ High Grade Dysplasia, Osteoporosis, Asthma, Tobacco Use, Sciatica, Depression, Hx of R Lung Nodule,  Hx of Mucinous Ovarian Cystadenoma s/p DARINEL BSO who presented to [facility] ED on  w/ epigastric and LUQ pain. Pt found to have choledocholithiasis w/ overlying infection, s/p ERCP, sphincterotomy and biliary stent placement.     Interval History: NAEON. AF. Htn in systolic 160s-170s yesterday but has downtrended this AM. Denies any abdominal pain. Denies n/v. Denies CP, SOB, light-headedness/dizziness, myalgias. Tolerated regular diet last PM. Voiding spontaneously.     Review of Systems:  As noted in HPI      Objective:     Last 24 Hour Vital Signs:  BP  Min: 116/68  Max: 179/66  Temp  Av.6 °F (36.4 °C)  Min: 97.2 °F (36.2 °C)  Max: 98.4 °F (36.9 °C)  Pulse  Av.5  Min: 58  Max: 82  Resp  Av  Min: 14  Max: 20  SpO2  Av.1 %  Min: 95 %  Max: 100 %  I/O last 3 completed shifts:  In: 1200 [I.V.:1200]  Out: -     Physical Examination:  Gen: In NAD, resting in bed   HEENT: PEERLA, EOMI, No scleral icterus   Neck: FROM, no thyromegally   Heart: RRR, No MRG   Lungs: CTAB, NLB   Abd: S/NTTP /ND/+normoactive BS. No guarding or rebound tenderness.   MSK: 5/5 strength bilat upper and lower extremity, FROM bilat upper and lower extremity   Neuro: CN II-XII intact grossly   Skin: No Juandice. Warm and Dry. Well perfused.     Laboratory:  Most Recent Data:  CBC:   Lab Results   Component Value Date    WBC 13.0 (H) 2022    HGB 10.3 (L) 2022    HCT 32.9 (L) 2022     2022    MCV 91.6 2022    RDW 14.4 2022     WBC Differential:   Recent Labs   Lab 22  1332 22  0400 22  0446   WBC  20.7* 20.3* 13.0*   HGB 12.6 11.3* 10.3*   HCT 40.2 34.5* 32.9*    260 230   MCV 93.9 90.6 91.6     BMP:   Lab Results   Component Value Date     09/14/2022    K 4.0 09/14/2022    CO2 26 09/14/2022    BUN 8.7 (L) 09/14/2022    CREATININE 0.63 09/14/2022    CALCIUM 8.9 09/14/2022    MG 1.80 09/14/2022    PHOS 3.2 09/14/2022     LFTs:   Lab Results   Component Value Date    ALBUMIN 2.7 (L) 09/14/2022    BILITOT 1.9 (H) 09/14/2022    AST 96 (H) 09/14/2022    ALKPHOS 143 09/14/2022     (H) 09/14/2022     (H) 09/13/2022     Coags:   Lab Results   Component Value Date    INR 1.09 09/13/2022    PROTIME 13.9 09/13/2022    PTT 30.4 09/13/2022     FLP:   Lab Results   Component Value Date    CHOL 240 (H) 08/24/2021    HDL 47 08/24/2021    TRIG 168 (H) 08/24/2021     DM:   Lab Results   Component Value Date    CREATININE 0.63 09/14/2022     Thyroid:   Lab Results   Component Value Date    TSH 0.1326 (L) 08/24/2021      Anemia: No results found for: IRON, TIBC, FERRITIN, SATURATEDIRO  No results found for: [id number]  No results found for: FOLATE     Cardiac: No results found for: TROPONINI, CKTOTAL, CKMB, BNP      Microbiology Data:  Microbiology Results (last 7 days)       Procedure Component Value Units Date/Time    Blood Culture #1 **CANNOT BE ORDERED STAT** [[id number]]  (Normal) Collected: 09/12/22 1640    Order Status: Completed Specimen: Blood Updated: 09/13/22 2100     CULTURE, BLOOD ([facility]) No Growth At 24 Hours    Blood Culture #2 **CANNOT BE ORDERED STAT** [[id number]]  (Normal) Collected: 09/12/22 1541    Order Status: Completed Specimen: Blood Updated: 09/13/22 2001     CULTURE, BLOOD ([facility]) No Growth At 24 Hours             Other Results:  EKG (my interpretation): No new EKG.     Radiology:  Imaging Results              CT Abdomen Pelvis  Without Contrast (Final result)  Result time 09/12/22 22:06:12      Final result by Reza Mariano MD (09/12/22 22:06:12)                   Impression:       Pneumobilia.    Hiatal hernia.      Electronically signed by: Reza Mariano MD  Date:    09/12/2022  Time:    22:06               Narrative:    EXAMINATION:  CT ABDOMEN PELVIS WITHOUT CONTRAST    CLINICAL HISTORY:  Abdominal abscess/infection suspected;Abdominal pain, acute, nonlocalized;    TECHNIQUE:  Low dose axial images, sagittal and coronal reformations were obtained from the lung bases to the pubic symphysis.  No oral contrast was given.    Automatic exposure control (AEC) was utilized for dose reduction.    Dose: 226 mGycm    COMPARISON:  02/18/2022    FINDINGS:  There is a hiatal hernia present.  There is pneumobilia.  Spleen appears normal.  Pancreas appears normal.  The adrenals are not enlarged.  Kidneys appear normal.  Aorta shows calcification without an aneurysm present no distal ureteral lithiasis is seen there degenerative changes at L5-S1.  The appendix is not identified.    There are diverticulum in the colon without evidence of acute diverticulitis                                       US Abdomen Limited (Final result)  Result time 09/12/22 18:34:42      Final result by Jostin Scott MD (09/12/22 18:34:42)                   Impression:      Gallbladder not visualized.  Borderline dilatation of the common bile duct for a presumably post cholecystectomy patient.  There is underlying pneumobilia, but a 12 mm echogenic area in the distal common bile duct may be choledocholithiasis.  If needed, CT can be considered for further assessment.  The pneumobilia may significantly limit the quality of any MRCP images.      Electronically signed by: Jostin Scott  Date:    09/12/2022  Time:    18:34               Narrative:    EXAMINATION:  US ABDOMEN LIMITED    CLINICAL HISTORY:  hepatitis;    TECHNIQUE:  Gray-scale and color Doppler ultrasound images of the abdomen.    COMPARISON:  Ultrasound 03/04/2022    CT abdomen/pelvis 02/18/2022    FINDINGS:  No pancreatic abnormality appreciated.    Main portal  vein patent with normal flow direction.  Normal caliber of the superior IVC.    Cirrhotic liver morphology.  Underlying pneumobilia.  Gallbladder not seen.  Oval echogenic area in the distal portion of the common bile duct near the pancreatic head measures 12 mm with posterior shadowing.  No significant ascites.    No hydronephrosis or defined calcification in the right kidney.    Measurements:    - Liver: 14.4 cm    - CBD diameter: 10 mm    - Right kidney: 10.1 cm in length                                       X-Ray Abdomen AP 1 View (Final result)  Result time 09/12/22 15:09:40      Final result by Syed Crandall MD (09/12/22 15:09:40)                   Impression:      Residual feces.    Nonspecific gas pattern      Electronically signed by: Syed Crandall  Date:    09/12/2022  Time:    15:09               Narrative:    EXAMINATION:  XR ABDOMEN AP 1 VIEW    CLINICAL HISTORY:  abdominal discomfort;    TECHNIQUE:  AP X-RAY OF THE ABDOMEN:    CPT 22464    FINDINGS:  Examination reveals residual feces throughout the colon gas pattern is nonspecific with no clear evidence of ileus or obstruction no abnormal masses or calcifications identified                                       X-Ray Chest 1 View (Final result)  Result time 09/12/22 14:41:09      Final result by Aziza Barry MD (09/12/22 14:41:09)                   Impression:      No acute abnormality of the chest.      Electronically signed by: Aziza Barry  Date:    09/12/2022  Time:    14:41               Narrative:    EXAMINATION:  XR CHEST 1 VIEW    CLINICAL HISTORY:  Unspecified abdominal pain    TECHNIQUE:  AP chest    COMPARISON:  None.    FINDINGS:  The heart is normal in size.  The lungs are clear without focal consolidation.  There is no pleural effusion or visible pneumothorax.                                      Current Medications:     Infusions:   lactated ringers 100 mL/hr at 09/13/22 0909        Scheduled:   enoxaparin  40 mg  Subcutaneous Daily    hydroCHLOROthiazide  12.5 mg Oral Daily    pantoprozole (PROTONIX) IV  40 mg Intravenous Daily    piperacillin-tazobactam (ZOSYN) IVPB  4.5 g Intravenous Q8H        PRN:  albuterol sulfate, ibuprofen, indomethacin, sodium chloride 0.9%    Antibiotics and Day Number of Therapy:  Zosyn (Day 1)    Lines and Day Number of Therapy:  PIV (Day 2)     Assessment & Plan:   Choledocholithiasis w/ Cholangitis   CBD Dilation to 10 mm w/ 12 mm Echogenic Focus w/in Distal CBD   Pneumobilia, chronic   Elevated Bilirubin   Elevated AST and ALT   Elevated ALK Phos   -s/p ERCP w/ sphincterotomy and stent placement. Will need GI f/u for stent removal and duct clearance in 4-6 weeks.   -Surgery consulted for cholecystectomy, they have decided that since GB not visualized on most of imaging that they will not offer surgical intervention. GB noted on imaging on 2/2022 per radiologist and pt w/o hx of cholecystectomy. Asked surgery team to review those images/records. Spoke with pt this morning and she reported wanting cholecystectomy if indicated. However she doesn't want any intervention for rectal mass.   -Continue IV Zosyn, 5 day course of abx recommended by GI. May be able to change to PO equivalent pending course.   -Liver and Biliary abnormalities improving, Will CTM.   -PRN Pain Control     Sepsis (SIRS III/IV though transient) w/ suspected intraabdominal source   -Leukocytosis downtrended this AM, stable. Continue IV Zosyn as noted above. PT w/o clinical decline otherwise.    -Will f/u Blood Cultures, NG x 24 hours.     Anemia:   H/H w/ downtrend since admin, will CTM. Pt w/ benign abdominal exam, HDS and no gross signs of bleeding.     HTN:   HCTZ 12.5 mg qd, will CTM.     Hypophosphatemia:   S/p IV repletion and WNL.     Rectal Mass   -Pt continues to deny intervention. CT Abd/Pelvis done w/o contrast 2/2 iodine allergy and rectal exam deferred. No signs currently that rectal mass is direct cause of acute  presentation.   -GI recommends repeat endoscopy to evaluate poly/mass. Will need to have established as oupt.     Hiatal Hernia   GERD   -Continue Protonix while inpt, pt takes PPI at home.   -Pt symptoms may have be partially related to biliary disease, CTM symptomatically as oupt.   -Continue oupt follow up as scheduled.     Depression   -Recommended SSRI initiation by pt PCP during inpt rounds, pt not amenable currently.     Sciatica:   Holding home Lyrica, it is prescribed PRN and pt unable to list home medications.     Osteoporosis:   Holding home Alendronate at this time.     Tobacco Use:   Consider Nicotine patch while inpt, pt not amenable.     Asthma:   PRN Albuterol for wheezing/SOB     CODE STATUS: DNI   Access: PIV   Antibiotics: IV Zosyn   Diet: NPO pending Surgery eval.   DVT Prophylaxis: Lovenox 40 mg qd   GI Prophylaxis: On home PPI   Fluids: 100 ccs/LR     Disposition: Pending Course     Maureen Escobedo MD  [facility] HO-III

## 2022-09-14 NOTE — MEDICAL/APP STUDENT
[facility] Family Medicine Progress Note     Resident Team: [facility] Family Medicine  Attending Physician: Steve Beach, *  Resident: Dr. Maureen Escobedo       Subjective:      Brief HPI:  Ms. Stallings is a 61 yo F w/ PMHx including HTN, HLD, Hiatal Hernia, GERD, Rectal Mass w/ High Grade Dysplasia, Osteoporosis, Asthma, Tobacco Use, Sciatica, Depression, Hx of R Lung Nodule,  Hx of Mucinous Ovarian Cystadenoma s/p DARINEL BSO who presented to [facility] ED on  w/ epigastric and LUQ pain.     Interval History:   NAEON. Pt states that she is doing well. No new complaints. No longer reports LUQ abdominal pain or soreness. Denies fever/chills, chest pain, SOB, dizziness/lightheadedness, N/V, dysuria, constipation, diarrhea, BRBPR, rectal pain/discomfort, myalgias. Not ambulating much in room. Continues to be NPO for further imaging. Voiding spontaneously. Pt says she's fine with having a cholecystectomy, if needed.       Review of Systems:  ROS as indicated above      Objective:     Last 24 Hour Vital Signs:  BP  Min: 116/68  Max: 179/66  Temp  Av.7 °F (36.5 °C)  Min: 97.2 °F (36.2 °C)  Max: 98.4 °F (36.9 °C)  Pulse  Av.9  Min: 58  Max: 93  Resp  Av  Min: 14  Max: 20  SpO2  Av.9 %  Min: 95 %  Max: 100 %  I/O last 3 completed shifts:  In: 1200 [I.V.:1200]  Out: -     Physical Examination:  Gen: In bed. Alert. Not in acute distress.   HEENT: PEERLA. EOMI. No scleral icterus.  Neck: Supple. Full ROM. No thyromegaly. No lymphadenopathy.   Heart: Regular rate and rhythm. No murmurs, rubs, gallops appreciated. No leg edema.   Lungs: Clear to auscultation. Chest rise b/l and symmetric. Non-labored breathing. No crackles, rales, wheezing.  Abd: No tenderness to palpation. Soft, non-distended, normoactive bowel sounds. No guarding or rebound tenderness.  Extremities/MSK: Able to move extremities purposefully. 5/5 muscle strength in upper and lower extremities.   Neuro: CN II-XII intact grossly. No gross deficits  appreciated.   Skin: Warm and dry. Well perfused. No jaundice.     Laboratory:  Most Recent Data:  CBC:   Lab Results   Component Value Date    WBC 13.0 (H) 09/14/2022    HGB 10.3 (L) 09/14/2022    HCT 32.9 (L) 09/14/2022     09/14/2022    MCV 91.6 09/14/2022    RDW 14.4 09/14/2022     WBC Differential:   Recent Labs   Lab 09/12/22  1332 09/13/22  0400 09/14/22  0446   WBC 20.7* 20.3* 13.0*   HGB 12.6 11.3* 10.3*   HCT 40.2 34.5* 32.9*    260 230   MCV 93.9 90.6 91.6     BMP:   Lab Results   Component Value Date     09/14/2022    K 4.0 09/14/2022    CO2 26 09/14/2022    BUN 8.7 (L) 09/14/2022    CREATININE 0.63 09/14/2022    CALCIUM 8.9 09/14/2022    MG 1.80 09/14/2022    PHOS 3.2 09/14/2022     LFTs:   Lab Results   Component Value Date    ALBUMIN 2.7 (L) 09/14/2022    BILITOT 1.9 (H) 09/14/2022    AST 96 (H) 09/14/2022    ALKPHOS 143 09/14/2022     (H) 09/14/2022     (H) 09/13/2022     Coags:   Lab Results   Component Value Date    INR 1.09 09/13/2022    PROTIME 13.9 09/13/2022    PTT 30.4 09/13/2022     FLP:   Lab Results   Component Value Date    CHOL 240 (H) 08/24/2021    HDL 47 08/24/2021    TRIG 168 (H) 08/24/2021     DM:   Lab Results   Component Value Date    CREATININE 0.63 09/14/2022     Thyroid:   Lab Results   Component Value Date    TSH 0.1326 (L) 08/24/2021      Anemia: No results found for: IRON, TIBC, FERRITIN, SATURATEDIRO  No results found for: HELUBHRY11  No results found for: FOLATE     Cardiac: No results found for: TROPONINI, CKTOTAL, CKMB, BNP      Microbiology Data:  Microbiology Results (last 7 days)       Procedure Component Value Units Date/Time    Blood Culture #1 **CANNOT BE ORDERED STAT** [[id number]]  (Normal) Collected: 09/12/22 1640    Order Status: Completed Specimen: Blood Updated: 09/13/22 2100     CULTURE, BLOOD ([facility]) No Growth At 24 Hours    Blood Culture #2 **CANNOT BE ORDERED STAT** [[id number]]  (Normal) Collected: 09/12/22 1541    Order  Status: Completed Specimen: Blood Updated: 09/13/22 2001     CULTURE, BLOOD ([facility]) No Growth At 24 Hours             Other Results:  EKG (my interpretation): not indicated    Radiology:  Imaging Results              CT Abdomen Pelvis  Without Contrast (Final result)  Result time 09/12/22 22:06:12      Final result by Reza Mariano MD (09/12/22 22:06:12)                   Impression:      Pneumobilia.    Hiatal hernia.      Electronically signed by: Reza Mariano MD  Date:    09/12/2022  Time:    22:06               Narrative:    EXAMINATION:  CT ABDOMEN PELVIS WITHOUT CONTRAST    CLINICAL HISTORY:  Abdominal abscess/infection suspected;Abdominal pain, acute, nonlocalized;    TECHNIQUE:  Low dose axial images, sagittal and coronal reformations were obtained from the lung bases to the pubic symphysis.  No oral contrast was given.    Automatic exposure control (AEC) was utilized for dose reduction.    Dose: 226 mGycm    COMPARISON:  02/18/2022    FINDINGS:  There is a hiatal hernia present.  There is pneumobilia.  Spleen appears normal.  Pancreas appears normal.  The adrenals are not enlarged.  Kidneys appear normal.  Aorta shows calcification without an aneurysm present no distal ureteral lithiasis is seen there degenerative changes at L5-S1.  The appendix is not identified.    There are diverticulum in the colon without evidence of acute diverticulitis                                       US Abdomen Limited (Final result)  Result time 09/12/22 18:34:42      Final result by Jostin Scott MD (09/12/22 18:34:42)                   Impression:      Gallbladder not visualized.  Borderline dilatation of the common bile duct for a presumably post cholecystectomy patient.  There is underlying pneumobilia, but a 12 mm echogenic area in the distal common bile duct may be choledocholithiasis.  If needed, CT can be considered for further assessment.  The pneumobilia may significantly limit the quality of any MRCP  images.      Electronically signed by: Jostinrt Scott  Date:    09/12/2022  Time:    18:34               Narrative:    EXAMINATION:  US ABDOMEN LIMITED    CLINICAL HISTORY:  hepatitis;    TECHNIQUE:  Gray-scale and color Doppler ultrasound images of the abdomen.    COMPARISON:  Ultrasound 03/04/2022    CT abdomen/pelvis 02/18/2022    FINDINGS:  No pancreatic abnormality appreciated.    Main portal vein patent with normal flow direction.  Normal caliber of the superior IVC.    Cirrhotic liver morphology.  Underlying pneumobilia.  Gallbladder not seen.  Oval echogenic area in the distal portion of the common bile duct near the pancreatic head measures 12 mm with posterior shadowing.  No significant ascites.    No hydronephrosis or defined calcification in the right kidney.    Measurements:    - Liver: 14.4 cm    - CBD diameter: 10 mm    - Right kidney: 10.1 cm in length                                       X-Ray Abdomen AP 1 View (Final result)  Result time 09/12/22 15:09:40      Final result by Syed Crandall MD (09/12/22 15:09:40)                   Impression:      Residual feces.    Nonspecific gas pattern      Electronically signed by: Syed Crandall  Date:    09/12/2022  Time:    15:09               Narrative:    EXAMINATION:  XR ABDOMEN AP 1 VIEW    CLINICAL HISTORY:  abdominal discomfort;    TECHNIQUE:  AP X-RAY OF THE ABDOMEN:    CPT 26178    FINDINGS:  Examination reveals residual feces throughout the colon gas pattern is nonspecific with no clear evidence of ileus or obstruction no abnormal masses or calcifications identified                                       X-Ray Chest 1 View (Final result)  Result time 09/12/22 14:41:09      Final result by Aziza Barry MD (09/12/22 14:41:09)                   Impression:      No acute abnormality of the chest.      Electronically signed by: Aziza Barry  Date:    09/12/2022  Time:    14:41               Narrative:    EXAMINATION:  XR CHEST 1  VIEW    CLINICAL HISTORY:  Unspecified abdominal pain    TECHNIQUE:  AP chest    COMPARISON:  None.    FINDINGS:  The heart is normal in size.  The lungs are clear without focal consolidation.  There is no pleural effusion or visible pneumothorax.                                    *ERCP (09/14/22):   Impression:    - The common bile duct was dilated, with a stone                          and sludge causing an obstruction.                          - Choledocholithiasis was found. Partial removal                          was accomplished with biliary sphincterotomy; a                          stent was inserted.                          - A biliary sphincterotomy was performed.                          - The biliary tree was swept and pus was found.                          - One temporary plastic biliary stent was placed                          into the common bile duct.                          - Indomethacin given to decrease risk of post-ERCP                          pancreatitis.     FINDINGS:  ERCP images demonstrate cannulation of the common bile duct and contrast injection. Choledocholithiasis.  Placement of a common bile duct stent noted    Current Medications:     Infusions:   lactated ringers 100 mL/hr at 09/13/22 0909        Scheduled:   enoxaparin  40 mg Subcutaneous Daily    hydroCHLOROthiazide  12.5 mg Oral Daily    pantoprozole (PROTONIX) IV  40 mg Intravenous Daily    piperacillin-tazobactam (ZOSYN) IVPB  4.5 g Intravenous Q8H        PRN:  albuterol sulfate, ibuprofen, indomethacin, sodium chloride 0.9%    Antibiotics and Day Number of Therapy:  Zosyn (Day 2)     Lines and Day Number of Therapy:  PIV (Day 3)     Assessment & Plan:     CBD Dilation to 10 mm w/ 12 mm Echogenic Focus w/in Distal CBD   Pneumobilia, chronic   Elevated Bilirubin   Elevated AST and ALT   Elevated ALK Phos and GGT   -ALT/AST decreased from 391/327 on 09/13/22 to 227/96 on 09/14/22.    -GI consulted. Will f/u recs. S/p ERCP,  findings show choledocholithiasis with stent placed. More details of ERCP reported above.     -Pt declines ever having cholecystectomy, though gall bladder rarely visualized on oupt imaging. No scars of a cholecystectomy visualized.     -Consulted surgery. Surgery notes no need for acute surgery intervention at this time since there is no definitive evidence of a gallbladder on imaging. A CT abdomen & pelvis scan on 02/18/22, in the [employer] system, reports gallbladder appears contracted and may contain contrast. During family medicine inpatient rounds, pt says she's fine with cholecystectomy, if needed. Will further communicate with surgery.     -Consider HIDA scan to further visualize the gallbladder.    -Continue IV Zosyn in setting of leukocytosis.     -PRN Pain Control      Sepsis (SIRS III/IV though transient) w/ suspected intraabdominal source   -Leukocytosis persists this AM, but decreased from 20 on 09/13/22 to 13 on 09/14/22. Continue IV Zosyn as noted above. PT w/o clinical decline otherwise.      -Blood cultures show no growth at 24 hours      Hypophosphatemia:   -1.4 on admit and 2.0 on 09/13/22, needs repletion post ERCP. Given potassium phosphate 30mmol IV.    -On 09/14/22, phosphate is now 3.20.      Rectal Mass   -Pt continues to decline intervention. CT Abd/Pelvis done w/o contrast 2/2 iodine allergy and rectal exam deferred. No signs currently that rectal mass is direct cause of acute presentation.      Hiatal Hernia   GERD   -Continue Protonix while inpt, pt takes PPI at home.     -Continue oupt follow up as scheduled.      Sciatica:   -Holding home Lyrica, it is prescribed PRN and pt unable to list home medications.      Osteoporosis:   Holding home Alendronate at this time.      Tobacco Use:   -Consider Nicotine patch while inpt if pt amenable.     Hypertension   -Continue HCTZ 12.5mg qd      Asthma:   -PRN Albuterol for wheezing/SOB     Depression:   -In care update note on 09/13/22, pt reports  to resident that she's feeling depressed but denies SI/HI. Considered lexapro 10mg qd.     -On 09/14/22, pt says she does not want to start an antidepressant right now. Will re-consider during outpatient follow-up appointment.      CODE STATUS: DNI   Access: PIV   Antibiotics: IV Zosyn   Diet: NPO   DVT Prophylaxis: Lovenox 40 mg qd   GI Prophylaxis: On home PPI   Fluids:  ccs/hr     Disposition: Pending Course     Nano Lind, MS4 [facility]-NO

## 2022-09-14 NOTE — PLAN OF CARE
TRANSITION OF CARE PROGRESS NOTE    I have received checkout from Dr. Escobedo regarding this patient.     HO1 assessment:  Admission diagnosis: Choledocholithiasis with overlying infection   Overnight management: Keep NPO past midnight for surgery eval, Monitor BP closely use PRNs as necessary, Continue IV Zosyn, Pain control use PRNs as necessary  Code status: Partial- No mechanical ventilation with intubation    Please call Dr. Pickering at [phone] from 09/14/2022 at 4PM to 09/15/2022 at 7AM if any issues arise.    Kellen Belle DO  [facility] Family Medicine, HO-1

## 2022-09-14 NOTE — MEDICAL/APP STUDENT
Surgery Follow-up Note:    HPI: Lore Stallings is a 62 y.o. female with PMH HTN, asthma, GERD, hiatal hernia, chronic pneumobilia, appendectomy, , and rectal mass (biopsy proven high grade dysplasia) for which she has not been interested in surgical intervention. She presented on  with acute onset LUQ abdominal pain, workup showed choledocholithiasis     Interval  NAEON, Afebrile, HDS  Reports pain controlled on current regimen    Denies CP, SOB, Fever, N/V    Objective:  Temp:  [97.2 °F (36.2 °C)-98.4 °F (36.9 °C)] 98.3 °F (36.8 °C)  Pulse:  [58-93] 76  Resp:  [14-20] 18  SpO2:  [95 %-100 %] 95 %  BP: (116-179)/(63-82) 116/68  Intake/Output - Last 3 Shifts          0700   0659  0700   0659    I.V. (mL/kg)  1200 (22.2)    Total Intake(mL/kg)  1200 (22.2)    Net  +1200          Urine Occurrence 1 x     Stool Occurrence 0 x              Physical Exam:  General: NAD, alert to person, place, and date  Neuro: CN 2-12 grossly intact  HEENT: normocephalic, atraumatic, EOMI  CV: RR  Pulm: Non-labored breathing, equal chest rise on RA  Abdomen: Soft, nondistended, nontender. No guarding or rebound tenderness. No organomegaly or palpable masses  Extremities: no edema, pulses palpated in all 4 extremities     Wounds/Incisions: midline well healed scar    Labs  Recent Labs   Lab 22  1332 22  0400 22  0446   WBC 20.7* 20.3* 13.0*   HGB 12.6 11.3* 10.3*   HCT 40.2 34.5* 32.9*    260 230    141 139   K 4.1 3.5 4.0   CO2 25 23 26   BUN 11.7 9.2* 8.7*   CREATININE 0.60 0.60 0.63   MG 1.80 1.80 1.80   PHOS 1.4* 2.0* 3.2   INR  --  1.09  --        Imaging  22 ERCP: ERCP images demonstrate cannulation of the common bile duct and contrast injection. Choledocholithiasis.  Placement of a common bile duct stent noted.    Assessment/Plan  Lore Stallings is a 62 y.o. female who presented  with acute LUQ abdominal pain, workup showed choledocholithiasis.   -All  imaging, including priors, shows no evidence of gallbladder, although patient denies ever having surgery on gallbladder. Will review imaging with radiology  -WBC, Tbili, LFTs downtrending  -continues to be uninterested in surgical intervention for her rectal mass.          Francisco Anne, [employer] MS4

## 2022-09-14 NOTE — PROGRESS NOTES
GENERAL SURGERY  PROGRESS NOTE    Admit Date: 9/12/2022  Hospital Day: 2  Procedure: 1 Day Post-Op ERCP, sphincterotomy, stent placement    24 hour events:  Bilirubin downtrending this morning. Pain much improved. Denies fevers. Leukocytosis improving.      Physical Exam:  General: No acute distress  Neck: trachea midline  Chest: equal chest rise  Abdomen: soft, NT, ND  : No blood at meatus  MSK: Full ROM to all extremities.    Assessment/Plan:  62 y.o. female with a PMH of GERD, Hiatal hernia, chronic pneumobilia, and rectal mass (biopsy showed high grade dysplasia - patient refuses any surgical intervention of this mass). She presented to the hospital on 9/12/22 with acute onset left upper quadrant abdominal pain. Workup showed choledocholithiasis. All imaging has failed to show a gallbladder including US and CT. Review of previous US and CT in [facility] also failed to show a gallbladder.    - No plans for surgical intervention  - Will plans for surgery if there is no definitive evidence of a gallbladder on imaging  - Trend CMP  - Patient declines any intervention for her rectal mass despite my recommendation that she should at least undergo a local excision for high grade dysplasia of the rectum  - Surgery will sign off given that she does not want any surgical intervention and has no definitive evidence of a gallbladder  - GI to remove stent in 6 weeks  - Call with questions or concerns    Gabe Diehl MD  General Surgery HO3    Inpatient Data      Vitals:   Temp:  [97.2 °F (36.2 °C)-98.4 °F (36.9 °C)]   Pulse:  [58-93]   Resp:  [14-20]   BP: (116-179)/(63-82)   SpO2:  [95 %-100 %]     Diet NPO   Intake/Output Summary (Last 24 hours) at 9/14/2022 0632  Last data filed at 9/13/2022 1407  Gross per 24 hour   Intake 1200 ml   Output --   Net 1200 ml          Recent Labs     09/12/22  1332 09/13/22  0400 09/14/22  0446   WBC 20.7* 20.3* 13.0*   HGB 12.6 11.3* 10.3*   HCT 40.2 34.5* 32.9*    260 230   NA  142 141 139   K 4.1 3.5 4.0   CO2 25 23 26   BUN 11.7 9.2* 8.7*   CREATININE 0.60 0.60 0.63   BILITOT 1.9* 4.1* 1.9*   AST 1,085* 327* 96*   * 391* 227*   ALKPHOS 154* 155* 143   CALCIUM 9.3 8.5 8.9   ALBUMIN 3.4 2.9* 2.7*   MG 1.80 1.80 1.80   PHOS 1.4* 2.0* 3.2     Scheduled Meds: enoxaparin, 40 mg, Daily  hydroCHLOROthiazide, 12.5 mg, Daily  pantoprozole (PROTONIX) IV, 40 mg, Daily  piperacillin-tazobactam (ZOSYN) IVPB, 4.5 g, Q8H     lactated ringers 100 mL/hr at 09/13/22 0909

## 2022-09-15 LAB
ALBUMIN SERPL-MCNC: 3 GM/DL (ref 3.4–4.8)
ALBUMIN/GLOB SERPL: 0.9 RATIO (ref 1.1–2)
ALP SERPL-CCNC: 136 UNIT/L (ref 40–150)
ALT SERPL-CCNC: 173 UNIT/L (ref 0–55)
AST SERPL-CCNC: 41 UNIT/L (ref 5–34)
BILIRUBIN DIRECT+TOT PNL SERPL-MCNC: 1.2 MG/DL
BUN SERPL-MCNC: 7.8 MG/DL (ref 9.8–20.1)
CALCIUM SERPL-MCNC: 9.7 MG/DL (ref 8.4–10.2)
CHLORIDE SERPL-SCNC: 101 MMOL/L (ref 98–107)
CO2 SERPL-SCNC: 31 MMOL/L (ref 23–31)
CREAT SERPL-MCNC: 0.73 MG/DL (ref 0.55–1.02)
ERYTHROCYTE [DISTWIDTH] IN BLOOD BY AUTOMATED COUNT: 14.6 % (ref 11.5–17)
GFR SERPLBLD CREATININE-BSD FMLA CKD-EPI: >60 MLS/MIN/1.73/M2
GLOBULIN SER-MCNC: 3.3 GM/DL (ref 2.4–3.5)
GLUCOSE SERPL-MCNC: 100 MG/DL (ref 82–115)
HCT VFR BLD AUTO: 34.5 % (ref 37–47)
HGB BLD-MCNC: 11.2 GM/DL (ref 12–16)
MCH RBC QN AUTO: 29.2 PG (ref 27–31)
MCHC RBC AUTO-ENTMCNC: 32.5 MG/DL (ref 33–36)
MCV RBC AUTO: 89.8 FL (ref 80–94)
NRBC BLD AUTO-RTO: 0 %
PLATELET # BLD AUTO: 251 X10(3)/MCL (ref 130–400)
PMV BLD AUTO: 10.2 FL (ref 7.4–10.4)
POTASSIUM SERPL-SCNC: 3.4 MMOL/L (ref 3.5–5.1)
PROT SERPL-MCNC: 6.3 GM/DL (ref 5.8–7.6)
RBC # BLD AUTO: 3.84 X10(6)/MCL (ref 4.2–5.4)
SODIUM SERPL-SCNC: 141 MMOL/L (ref 136–145)
WBC # SPEC AUTO: 9.9 X10(3)/MCL (ref 4.5–11.5)

## 2022-09-15 PROCEDURE — 27000221 HC OXYGEN, UP TO 24 HOURS

## 2022-09-15 PROCEDURE — 94760 N-INVAS EAR/PLS OXIMETRY 1: CPT

## 2022-09-15 PROCEDURE — 21400001 HC TELEMETRY ROOM

## 2022-09-15 PROCEDURE — C9113 INJ PANTOPRAZOLE SODIUM, VIA: HCPCS | Performed by: STUDENT IN AN ORGANIZED HEALTH CARE EDUCATION/TRAINING PROGRAM

## 2022-09-15 PROCEDURE — 25000003 PHARM REV CODE 250: Performed by: STUDENT IN AN ORGANIZED HEALTH CARE EDUCATION/TRAINING PROGRAM

## 2022-09-15 PROCEDURE — 85027 COMPLETE CBC AUTOMATED: CPT | Performed by: STUDENT IN AN ORGANIZED HEALTH CARE EDUCATION/TRAINING PROGRAM

## 2022-09-15 PROCEDURE — 80053 COMPREHEN METABOLIC PANEL: CPT | Performed by: STUDENT IN AN ORGANIZED HEALTH CARE EDUCATION/TRAINING PROGRAM

## 2022-09-15 PROCEDURE — 63600175 PHARM REV CODE 636 W HCPCS: Performed by: STUDENT IN AN ORGANIZED HEALTH CARE EDUCATION/TRAINING PROGRAM

## 2022-09-15 PROCEDURE — 99900035 HC TECH TIME PER 15 MIN (STAT)

## 2022-09-15 PROCEDURE — 94761 N-INVAS EAR/PLS OXIMETRY MLT: CPT

## 2022-09-15 PROCEDURE — 36415 COLL VENOUS BLD VENIPUNCTURE: CPT | Performed by: STUDENT IN AN ORGANIZED HEALTH CARE EDUCATION/TRAINING PROGRAM

## 2022-09-15 RX ORDER — ESCITALOPRAM OXALATE 10 MG/1
10 TABLET ORAL DAILY
Status: DISCONTINUED | OUTPATIENT
Start: 2022-09-15 | End: 2022-09-18 | Stop reason: HOSPADM

## 2022-09-15 RX ORDER — TALC
6 POWDER (GRAM) TOPICAL NIGHTLY PRN
Status: DISCONTINUED | OUTPATIENT
Start: 2022-09-15 | End: 2022-09-18 | Stop reason: HOSPADM

## 2022-09-15 RX ORDER — POTASSIUM CHLORIDE 20 MEQ/1
40 TABLET, EXTENDED RELEASE ORAL ONCE
Status: COMPLETED | OUTPATIENT
Start: 2022-09-15 | End: 2022-09-15

## 2022-09-15 RX ADMIN — POTASSIUM CHLORIDE 40 MEQ: 1500 TABLET, EXTENDED RELEASE ORAL at 09:09

## 2022-09-15 RX ADMIN — ESCITALOPRAM OXALATE 10 MG: 10 TABLET, FILM COATED ORAL at 10:09

## 2022-09-15 RX ADMIN — PIPERACILLIN AND TAZOBACTAM 4.5 G: 4; .5 INJECTION, POWDER, LYOPHILIZED, FOR SOLUTION INTRAVENOUS; PARENTERAL at 09:09

## 2022-09-15 RX ADMIN — MELATONIN TAB 3 MG 6 MG: 3 TAB at 10:09

## 2022-09-15 RX ADMIN — AMLODIPINE BESYLATE 10 MG: 10 TABLET ORAL at 09:09

## 2022-09-15 RX ADMIN — PIPERACILLIN AND TAZOBACTAM 4.5 G: 4; .5 INJECTION, POWDER, LYOPHILIZED, FOR SOLUTION INTRAVENOUS; PARENTERAL at 01:09

## 2022-09-15 RX ADMIN — PANTOPRAZOLE SODIUM 40 MG: 40 INJECTION, POWDER, FOR SOLUTION INTRAVENOUS at 09:09

## 2022-09-15 NOTE — MEDICAL/APP STUDENT
Surgery Follow-up Note:    HPI: Lore Stallings is a 62 y.o. female with PMH HTN, asthma, GERD, hiatal hernia, chronic pneumobilia, appendectomy, , and rectal mass (biopsy proven high grade dysplasia) for which she has not been interested in surgical intervention. She presented on  with acute onset LUQ abdominal pain, workup showed choledocholithiasis     POD 2 s/p ERCP, sphincterotomy, stent    Interval  NAEON, HDS  Afebrile, LFTs, Tbili downtrending. Denies increasing pain  Tolerating regular diet without N/V. Complaining of diarrhea    Denies CP, SOB, Fever, N/V      Objective:  Temp:  [97.5 °F (36.4 °C)-98.2 °F (36.8 °C)] 97.8 °F (36.6 °C)  Pulse:  [61-81] 61  Resp:  [16-20] 18  SpO2:  [95 %-98 %] 96 %  BP: (124-184)/(61-79) 124/61  Intake/Output - Last 3 Shifts          0700   0659  0700  /15 0659    P.O.  640    I.V. (mL/kg) 1200 (22.2) 696.7 (12.8)    IV Piggyback  414.2    Total Intake(mL/kg) 1200 (22.2) 1750.8 (32.2)    Urine (mL/kg/hr)  600 (0.5)    Stool  1    Total Output  601    Net +1200 +1149.8          Urine Occurrence  10 x    Stool Occurrence  1 x             Physical Exam:  General: NAD, alert to person, place, and date  Neuro: CN 2-12 grossly intact  HEENT: normocephalic, atraumatic, EOMI  CV: RR  Pulm: Non-labored breathing, equal chest rise on RA  Abdomen: Soft, nondistended, nontender. No guarding or rebound tenderness. No organomegaly or palpable masses  Extremities: no edema, pulses palpated in all 4 extremities     Labs  Recent Labs   Lab 22  1332 22  0400 22  0446 09/15/22  0429   WBC 20.7* 20.3* 13.0* 9.9   HGB 12.6 11.3* 10.3* 11.2*   HCT 40.2 34.5* 32.9* 34.5*    260 230 251    141 139 141   K 4.1 3.5 4.0 3.4*   CO2 25 23 26 31   BUN 11.7 9.2* 8.7* 7.8*   CREATININE 0.60 0.60 0.63 0.73   MG 1.80 1.80 1.80  --    PHOS 1.4* 2.0* 3.2  --    INR  --  1.09  --   --          Assessment/Plan  Lore HARTLEY Carlosjulitoanderson is a 62 y.o. female  Lore Stallings is a 62 y.o. female who presented 9/12 with acute LUQ abdominal pain, workup showed choledocholithiasis.     -Still some discrepancy surrounding presence of gallbladder on imaging. Will review imaging with radiology today  -WBC, Tbili, LFTs downtrending  -continues to be uninterested in surgical intervention for her rectal mass that showed high-grade dysplasia in November, 2021      Francisco Anne, [facility] MS4

## 2022-09-15 NOTE — CARE UPDATE
"On call resident note:    Received call that patient requested documentation for court. She requests letter as proof of stay to delay upcoming court case for restraining order against family member. Patient specifically requested that diagnosis be included in letter.     Also informed by nursing staff that IV infiltrated. Pt refusing new IV at this time. Discussed with pt the need for IV to receive medication. Open to getting new IV after "having a break"    Tosin Holly M.D.  [facility] HO-2    "

## 2022-09-15 NOTE — NURSING
Pt iv was removed due to iv infiltrating. Pt refused to let me start a new one. Ice pack applied to area where iv was. Will continue to monitor

## 2022-09-15 NOTE — CONSULTS
[facility] [place] [facility] Requested that Dr Escobedo document final plan with end date of abx. Will follow.

## 2022-09-15 NOTE — PROGRESS NOTES
GENERAL SURGERY  PROGRESS NOTE    Admit Date: 9/12/2022  Hospital Day: 3  Procedure: 2 Days Post-Op ERCP with sphincterotomy, sweeping of CBD, and stent    24 hour events:  NAEO. Denies abdominal pain, afebrile, UOP adequate. She is interested in surgery for her gallbladder if it is causing an issue. Denies any intervention for her rectal tumor and is not interested in discussing this further.    Physical Exam:  General: No acute distress  Neck: trachea midline  Chest: equal chest rise  Abdomen: soft, NT, ND  : No blood at meatus  MSK: Full ROM to all extremities.    Assessment/Plan:  61 yo F with PMH of HTN, Asthma, GERD, HH, chronic pneumobilia, and rectal tumor (biopsy proven high grade dysplasia in 2021 - denies any further workup or surgical intervention) who presented to the hospital for choledocholithiasis. S/p ERCP, sphincterotomy, and stent. Has never had a cholecystectomy.    - After reviewing prior CT scan's and MRCP (performed in March of 2022) from [facility] with Radiology staff it is likely that she has chronic atrophy of her gallbladder with a stone at the fundus.  - There is no choledocho-enteric fistula to explain her pneumobilia, this would have been visualized on her cholangiogram during ERCP.  - The gallstone that caused her choledocholithiasis was well above where her cystic duct enters the common duct. This stone did not come from her contracted gallbladder.  - No indication for cholecystectomy  - Surgery will sign off    Gabe Diehl MD  General Surgery HO3    Inpatient Data      Vitals:   Temp:  [97.5 °F (36.4 °C)-98.2 °F (36.8 °C)]   Pulse:  [61-81]   Resp:  [16-20]   BP: (124-184)/(61-79)   SpO2:  [95 %-98 %]     Diet NPO   Intake/Output Summary (Last 24 hours) at 9/15/2022 0716  Last data filed at 9/15/2022 0500  Gross per 24 hour   Intake 2407.51 ml   Output 601 ml   Net 1806.51 ml          Recent Labs     09/12/22  1332 09/13/22  0400 09/14/22  0446 09/15/22  0429   WBC 20.7* 20.3*  13.0* 9.9   HGB 12.6 11.3* 10.3* 11.2*   HCT 40.2 34.5* 32.9* 34.5*    260 230 251    141 139 141   K 4.1 3.5 4.0 3.4*   CO2 25 23 26 31   BUN 11.7 9.2* 8.7* 7.8*   CREATININE 0.60 0.60 0.63 0.73   BILITOT 1.9* 4.1* 1.9* 1.2   AST 1,085* 327* 96* 41*   * 391* 227* 173*   ALKPHOS 154* 155* 143 136   CALCIUM 9.3 8.5 8.9 9.7   ALBUMIN 3.4 2.9* 2.7* 3.0*   MG 1.80 1.80 1.80  --    PHOS 1.4* 2.0* 3.2  --      Scheduled Meds: amLODIPine, 10 mg, Daily  enoxaparin, 40 mg, Daily  hydroCHLOROthiazide, 12.5 mg, Daily  pantoprozole (PROTONIX) IV, 40 mg, Daily  piperacillin-tazobactam (ZOSYN) IVPB, 4.5 g, Q8H

## 2022-09-15 NOTE — PROGRESS NOTES
"[facility] Progress Note     Resident Team: [facility]  Attending Physician: Steve Beach, *  Resident: Dr. Maureen Escobedo       Subjective:      Brief HPI:  Ms. Stallings is a 63 yo F w/ PMHx including HTN, HLD, Hiatal Hernia, GERD, Rectal Mass w/ High Grade Dysplasia, Osteoporosis, Asthma, Tobacco Use, Sciatica, Depression, Hx of R Lung Nodule,  Hx of Mucinous Ovarian Cystadenoma s/p DARINEL BSO who presented to [facility] ED on  w/ epigastric and LUQ pain. Pt found to have choledocholithiasis w/ overlying infection, s/p ERCP, sphincterotomy and biliary stent placement.     Interval History: NAEON. AF. Htn to systolic 180s  yesterday but has downtrended this AM. Added Amlodipine 10 mg, but pt didn't get PRNs.  Denies any abdominal pain. Denies n/v. Denies CP, SOB, light-headedness/dizziness, myalgias. Tolerated regular diet last PM. Voiding spontaneously.     Review of Systems:  As noted in HPI      Objective:     Last 24 Hour Vital Signs:  BP  Min: 124/61  Max: 184/78  Temp  Av.8 °F (36.6 °C)  Min: 97.5 °F (36.4 °C)  Max: 98.2 °F (36.8 °C)  Pulse  Av.1  Min: 61  Max: 81  Resp  Av.3  Min: 16  Max: 20  SpO2  Av.2 %  Min: 94 %  Max: 98 %  Height  Av' 5.98" (167.6 cm)  Min: 5' 5.98" (167.6 cm)  Max: 5' 5.98" (167.6 cm)  Weight  Av.2 kg (119 lb 7.8 oz)  Min: 54.1 kg (119 lb 4.3 oz)  Max: 54.3 kg (119 lb 11.4 oz)  I/O last 3 completed shifts:  In: 2407.5 [P.O.:1120; I.V.:696.7; IV Piggyback:590.8]  Out: 601 [Urine:600; Stool:1]    Physical Examination:  Gen: In NAD, resting in bed   HEENT: PEERLA, EOMI, No scleral icterus   Neck: FROM, no thyromegally   Heart: RRR, No MRG   Lungs: CTAB, NLB   Abd: S/NTTP /ND/+normoactive BS. No guarding or rebound tenderness.   MSK: 5/5 strength bilat upper and lower extremity, FROM bilat upper and lower extremity   Neuro: CN II-XII intact grossly   Skin: No Juandice. Warm and Dry. Well perfused.     Laboratory:  Most Recent " Data:  CBC:   Lab Results   Component Value Date    WBC 9.9 09/15/2022    HGB 11.2 (L) 09/15/2022    HCT 34.5 (L) 09/15/2022     09/15/2022    MCV 89.8 09/15/2022    RDW 14.6 09/15/2022     WBC Differential:   Recent Labs   Lab 09/12/22  1332 09/13/22  0400 09/14/22  0446 09/15/22  0429   WBC 20.7* 20.3* 13.0* 9.9   HGB 12.6 11.3* 10.3* 11.2*   HCT 40.2 34.5* 32.9* 34.5*    260 230 251   MCV 93.9 90.6 91.6 89.8       BMP:   Lab Results   Component Value Date     09/15/2022    K 3.4 (L) 09/15/2022    CO2 31 09/15/2022    BUN 7.8 (L) 09/15/2022    CREATININE 0.73 09/15/2022    CALCIUM 9.7 09/15/2022    MG 1.80 09/14/2022    PHOS 3.2 09/14/2022     LFTs:   Lab Results   Component Value Date    ALBUMIN 3.0 (L) 09/15/2022    BILITOT 1.2 09/15/2022    AST 41 (H) 09/15/2022    ALKPHOS 136 09/15/2022     (H) 09/15/2022     (H) 09/13/2022     Coags:   Lab Results   Component Value Date    INR 1.09 09/13/2022    PROTIME 13.9 09/13/2022    PTT 30.4 09/13/2022     FLP:   Lab Results   Component Value Date    CHOL 240 (H) 08/24/2021    HDL 47 08/24/2021    TRIG 168 (H) 08/24/2021     DM:   Lab Results   Component Value Date    CREATININE 0.73 09/15/2022     Thyroid:   Lab Results   Component Value Date    TSH 0.1326 (L) 08/24/2021      Anemia: No results found for: IRON, TIBC, FERRITIN, SATURATEDIRO  No results found for: [id number]  No results found for: FOLATE     Cardiac: No results found for: TROPONINI, CKTOTAL, CKMB, BNP      Microbiology Data:  Microbiology Results (last 7 days)       Procedure Component Value Units Date/Time    Blood Culture #1 **CANNOT BE ORDERED STAT** [[id number]]  (Normal) Collected: 09/12/22 1640    Order Status: Completed Specimen: Blood Updated: 09/14/22 2100     CULTURE, BLOOD ([facility]) No Growth At 48 Hours    Blood Culture #2 **CANNOT BE ORDERED STAT** [[id number]]  (Normal) Collected: 09/12/22 1541    Order Status: Completed Specimen: Blood Updated: 09/14/22 2001      CULTURE, BLOOD ([facility]) No Growth At 48 Hours             Other Results:  EKG (my interpretation): No new EKG.     Radiology:  Imaging Results              CT Abdomen Pelvis  Without Contrast (Final result)  Result time 09/12/22 22:06:12      Final result by Reza Mariano MD (09/12/22 22:06:12)                   Impression:      Pneumobilia.    Hiatal hernia.      Electronically signed by: Reza Mariano MD  Date:    09/12/2022  Time:    22:06               Narrative:    EXAMINATION:  CT ABDOMEN PELVIS WITHOUT CONTRAST    CLINICAL HISTORY:  Abdominal abscess/infection suspected;Abdominal pain, acute, nonlocalized;    TECHNIQUE:  Low dose axial images, sagittal and coronal reformations were obtained from the lung bases to the pubic symphysis.  No oral contrast was given.    Automatic exposure control (AEC) was utilized for dose reduction.    Dose: 226 mGycm    COMPARISON:  02/18/2022    FINDINGS:  There is a hiatal hernia present.  There is pneumobilia.  Spleen appears normal.  Pancreas appears normal.  The adrenals are not enlarged.  Kidneys appear normal.  Aorta shows calcification without an aneurysm present no distal ureteral lithiasis is seen there degenerative changes at L5-S1.  The appendix is not identified.    There are diverticulum in the colon without evidence of acute diverticulitis                                       US Abdomen Limited (Final result)  Result time 09/12/22 18:34:42      Final result by Jostin Scott MD (09/12/22 18:34:42)                   Impression:      Gallbladder not visualized.  Borderline dilatation of the common bile duct for a presumably post cholecystectomy patient.  There is underlying pneumobilia, but a 12 mm echogenic area in the distal common bile duct may be choledocholithiasis.  If needed, CT can be considered for further assessment.  The pneumobilia may significantly limit the quality of any MRCP images.      Electronically signed by: Jostin  Tyler  Date:    09/12/2022  Time:    18:34               Narrative:    EXAMINATION:  US ABDOMEN LIMITED    CLINICAL HISTORY:  hepatitis;    TECHNIQUE:  Gray-scale and color Doppler ultrasound images of the abdomen.    COMPARISON:  Ultrasound 03/04/2022    CT abdomen/pelvis 02/18/2022    FINDINGS:  No pancreatic abnormality appreciated.    Main portal vein patent with normal flow direction.  Normal caliber of the superior IVC.    Cirrhotic liver morphology.  Underlying pneumobilia.  Gallbladder not seen.  Oval echogenic area in the distal portion of the common bile duct near the pancreatic head measures 12 mm with posterior shadowing.  No significant ascites.    No hydronephrosis or defined calcification in the right kidney.    Measurements:    - Liver: 14.4 cm    - CBD diameter: 10 mm    - Right kidney: 10.1 cm in length                                       X-Ray Abdomen AP 1 View (Final result)  Result time 09/12/22 15:09:40      Final result by Syed Crandall MD (09/12/22 15:09:40)                   Impression:      Residual feces.    Nonspecific gas pattern      Electronically signed by: Syed Crandall  Date:    09/12/2022  Time:    15:09               Narrative:    EXAMINATION:  XR ABDOMEN AP 1 VIEW    CLINICAL HISTORY:  abdominal discomfort;    TECHNIQUE:  AP X-RAY OF THE ABDOMEN:    CPT 51531    FINDINGS:  Examination reveals residual feces throughout the colon gas pattern is nonspecific with no clear evidence of ileus or obstruction no abnormal masses or calcifications identified                                       X-Ray Chest 1 View (Final result)  Result time 09/12/22 14:41:09      Final result by Aziza Barry MD (09/12/22 14:41:09)                   Impression:      No acute abnormality of the chest.      Electronically signed by: Aziza Barry  Date:    09/12/2022  Time:    14:41               Narrative:    EXAMINATION:  XR CHEST 1 VIEW    CLINICAL HISTORY:  Unspecified abdominal  pain    TECHNIQUE:  AP chest    COMPARISON:  None.    FINDINGS:  The heart is normal in size.  The lungs are clear without focal consolidation.  There is no pleural effusion or visible pneumothorax.                                      Current Medications:     Infusions:         Scheduled:   amLODIPine  10 mg Oral Daily    enoxaparin  40 mg Subcutaneous Daily    pantoprozole (PROTONIX) IV  40 mg Intravenous Daily    piperacillin-tazobactam (ZOSYN) IVPB  4.5 g Intravenous Q8H    potassium chloride  40 mEq Oral Once        PRN:  albuterol sulfate, hydrALAZINE, ibuprofen, indomethacin, labetalol, sodium chloride 0.9%    Antibiotics and Day Number of Therapy:  Zosyn     Lines and Day Number of Therapy:  PIV     Assessment & Plan:   Choledocholithiasis w/ Cholangitis   CBD Dilation to 10 mm w/ 12 mm Echogenic Focus w/in Distal CBD   Pneumobilia, chronic   Elevated Bilirubin   Elevated AST and ALT   Elevated ALK Phos   -s/p ERCP w/ sphincterotomy and stent placement. Will need GI f/u for stent removal and duct clearance in 4-6 weeks.   -Surgery consulted for cholecystectomy, they have decided that cholecystectomy not indicated. See note dated 9/15 for reasoning.   -Continue IV Zosyn, 5 day course of abx recommended by GI.   -Liver and Biliary abnormalities improving, Will CTM.   -PRN Pain Control     Sepsis (SIRS III/IV though transient) w/ suspected intraabdominal source, resolved.   -Leukocytosis further downtrended this AM, stable. Continue IV Zosyn as noted above. PT w/o clinical decline otherwise.    -Will f/u Blood Cultures, NG x 48 hours.     HTN  -Will continue Amlodipine 10 mg. Pt didn't want HCTZ 2/2 concern for increased urination. Will CTM BP while inpt and attempt to optimize.     Anemia:   H/H w/ downtrend initially, but now stable. Pt w/ benign abdominal exam, HDS and no gross signs of bleeding.     Hypophosphatemia:   S/p IV repletion and WNL.     Hypokalemia:   Mild, will replete orally     Rectal Mass    -Pt continues to deny intervention. CT Abd/Pelvis done w/o contrast 2/2 iodine allergy and rectal exam deferred. No signs currently that rectal mass is direct cause of acute presentation.   -GI recommends repeat endoscopy to evaluate poly/mass. Will need to have established as oupt.     Hiatal Hernia   GERD   -Continue Protonix while inpt, pt takes PPI at home.   -Pt symptoms may have be partially related to biliary disease, CTM symptomatically as oupt.   -Continue oupt follow up as scheduled.     Depression   -Recommended SSRI initiation by pt PCP during inpt rounds, pt not amenable currently.     Sciatica:   Holding home Lyrica, it is prescribed PRN and pt unable to list home medications.     Osteoporosis:   Holding home Alendronate at this time.     Tobacco Use:   Consider Nicotine patch while inpt, pt not amenable.     Asthma:   PRN Albuterol for wheezing/SOB     CODE STATUS: DNI   Access: PIV   Antibiotics: IV Zosyn   Diet: Heart Healthy   DVT Prophylaxis: Lovenox 40 mg qd   GI Prophylaxis: On home PPI   Fluids: None     Disposition: Pending Course . D/C after completion of Zosyn x 5 days.     Maureen Escobedo MD  [facility] HO-III

## 2022-09-15 NOTE — MEDICAL/APP STUDENT
"[facility] Progress Note     Resident Team: [facility]  Attending Physician: Steve Beach, *  Resident: Dr. Maureen Escobedo       Subjective:      Brief HPI:  Ms. Stallings is a 63 yo F w/ PMHx including HTN, HLD, Hiatal Hernia, GERD, Rectal Mass w/ High Grade Dysplasia, Osteoporosis, Asthma, Tobacco Use, Sciatica, Depression, Hx of R Lung Nodule,  Hx of Mucinous Ovarian Cystadenoma s/p DARINEL BSO who presented to [facility] ED on  w/ epigastric and LUQ pain.     Interval History:   Overnight, informed by nursing staff that IV infiltrated. Pt was seen and she stated that she has been taking a break from the IV. Pt refusing new IV at this time. Missed 3 doses of antibiotics. Discussed with pt the need for IV to receive medication. Open to getting a midline, will attempt this AM. Pt also refused Lovenox.     This morning, pt states that she is doing well. Pt reports insomnia and would like medication to help her sleep. Continues to deny LUQ abdominal pain or soreness. Denies fever/chills, chest pain, SOB, dizziness/lightheadedness, N/V, dysuria, constipation, diarrhea, BRBPR, rectal pain/discomfort, myalgias. Ambulating in room. Tolerating diet. Voiding spontaneously.     Review of Systems:  ROS as indicated above      Objective:     Last 24 Hour Vital Signs:  BP  Min: 124/61  Max: 184/78  Temp  Av.8 °F (36.6 °C)  Min: 97.5 °F (36.4 °C)  Max: 98.2 °F (36.8 °C)  Pulse  Av.3  Min: 61  Max: 81  Resp  Av.3  Min: 16  Max: 20  SpO2  Av.5 %  Min: 95 %  Max: 98 %  Height  Av' 5.98" (167.6 cm)  Min: 5' 5.98" (167.6 cm)  Max: 5' 5.98" (167.6 cm)  Weight  Av.2 kg (119 lb 7.8 oz)  Min: 54.1 kg (119 lb 4.3 oz)  Max: 54.3 kg (119 lb 11.4 oz)  I/O last 3 completed shifts:  In: 2407.5 [P.O.:1120; I.V.:696.7; IV Piggyback:590.8]  Out: 601 [Urine:600; Stool:1]    Physical Examination:  Gen: In bed. Alert. Not in acute distress.   HEENT: PEERLA. EOMI. No scleral icterus.  Neck: " Supple. Full ROM. No thyromegaly. No lymphadenopathy.   Heart: Regular rate and rhythm. No murmurs, rubs, gallops appreciated. No leg edema.   Lungs: Clear to auscultation. Chest rise b/l and symmetric. Non-labored breathing. No crackles, rales, wheezing.  Abd: No tenderness to palpation. Soft, non-distended, normoactive bowel sounds. No guarding or rebound tenderness.  Extremities/MSK: Able to move extremities purposefully. 5/5 muscle strength in upper and lower extremities.   Neuro: CN II-XII intact grossly. No gross deficits appreciated.   Skin: Warm and dry. Well perfused. No jaundice. No longer has IV in right arm.     Laboratory:  Most Recent Data:  CBC:   Lab Results   Component Value Date    WBC 9.9 09/15/2022    HGB 11.2 (L) 09/15/2022    HCT 34.5 (L) 09/15/2022     09/15/2022    MCV 89.8 09/15/2022    RDW 14.6 09/15/2022     WBC Differential:   Recent Labs   Lab 09/12/22  1332 09/13/22  0400 09/14/22  0446 09/15/22  0429   WBC 20.7* 20.3* 13.0* 9.9   HGB 12.6 11.3* 10.3* 11.2*   HCT 40.2 34.5* 32.9* 34.5*    260 230 251   MCV 93.9 90.6 91.6 89.8       BMP:   Lab Results   Component Value Date     09/15/2022    K 3.4 (L) 09/15/2022    CO2 31 09/15/2022    BUN 7.8 (L) 09/15/2022    CREATININE 0.73 09/15/2022    CALCIUM 9.7 09/15/2022    MG 1.80 09/14/2022    PHOS 3.2 09/14/2022     LFTs:   Lab Results   Component Value Date    ALBUMIN 3.0 (L) 09/15/2022    BILITOT 1.2 09/15/2022    AST 41 (H) 09/15/2022    ALKPHOS 136 09/15/2022     (H) 09/15/2022     (H) 09/13/2022       Microbiology Data:  Microbiology Results (last 7 days)       Procedure Component Value Units Date/Time    Blood Culture #1 **CANNOT BE ORDERED STAT** [[id number]]  (Normal) Collected: 09/12/22 1640    Order Status: Completed Specimen: Blood Updated: 09/14/22 2100     CULTURE, BLOOD ([facility]) No Growth At 48 Hours    Blood Culture #2 **CANNOT BE ORDERED STAT** [[id number]]  (Normal) Collected: 09/12/22 1541     Order Status: Completed Specimen: Blood Updated: 09/14/22 2001     CULTURE, BLOOD ([facility]) No Growth At 48 Hours             Other Results:  EKG (my interpretation): not indicated    Radiology:  No new imaging     Current Medications:     Infusions:         Scheduled:   amLODIPine  10 mg Oral Daily    enoxaparin  40 mg Subcutaneous Daily    hydroCHLOROthiazide  12.5 mg Oral Daily    pantoprozole (PROTONIX) IV  40 mg Intravenous Daily    piperacillin-tazobactam (ZOSYN) IVPB  4.5 g Intravenous Q8H        PRN:  albuterol sulfate, hydrALAZINE, ibuprofen, indomethacin, labetalol, sodium chloride 0.9%    Antibiotics and Day Number of Therapy:  Zosyn (Day 3), missed 3 doses    Lines and Day Number of Therapy:  None     Assessment & Plan:     Choledocholithiasis w/ Cholangitis   CBD Dilation to 10 mm w/ 12 mm Echogenic Focus w/in Distal CBD   Pneumobilia, chronic   Elevated Bilirubin   Elevated AST and ALT   Elevated ALK Phos   -s/p ERCP w/ sphincterotomy and stent placement. Will need GI f/u for stent removal and duct clearance in 4-6 weeks.   -Surgery consulted for cholecystectomy, they have decided that cholecystectomy is not indicated. See note dated 09/15 for reasoning.     -Considering midline placement for Zosyn administration since IV line was infiltrated. Pt no longer has an IV line placed in arm. Continue Zosyn, 5 day course of abx recommended by GI. May be able to change to PO equivalent pending course.   -Liver and Biliary abnormalities improving, Will CTM.   -PRN Pain Control      Sepsis (SIRS III/IV though transient) w/ suspected intraabdominal source   -WBC count continues to be WNL this AM. Continue IV Zosyn as noted above. PT w/o clinical decline otherwise.    -Will f/u Blood Cultures, NG x 72 hours.     Hypertension  -HTN overnight with systolics 150s-180s.   -Continue Amlodipine 10mg qd. Pt didn't want HCTZ 2/2 concern for increased urination. Will CTM BP while inpt and attempt to optimize.      Anemia:    -H/H w/ downtrend since admin, now stable. Will CTM. Pt w/ benign abdominal exam, HDS and no gross signs of bleeding.      Hypophosphatemia:   -S/p IV repletion and WNL.      Rectal Mass   -Pt continues to deny intervention. CT Abd/Pelvis done w/o contrast 2/2 iodine allergy and rectal exam deferred. No signs currently that rectal mass is direct cause of acute presentation.   -GI recommends repeat endoscopy to evaluate poly/mass. Will need to have established as oupt.      Hiatal Hernia   GERD   -Continue Protonix while inpt, pt takes PPI at home.   -Pt symptoms may have be partially related to biliary disease, CTM symptomatically as oupt.   -Continue oupt follow up as scheduled.      Depression   Insomnia  -Recommended SSRI initiation by pt PCP during inpt rounds, pt was initially not amenable, but started Lexapro 10mg qd.   -Considering Trazodone 50mg at night, which may also help with the depression      Sciatica:   Holding home Lyrica, it is prescribed PRN and pt unable to list home medications.      Osteoporosis:   Holding home Alendronate at this time.      Tobacco Use:   Consider Nicotine patch while inpt, pt not amenable.      Asthma:   PRN Albuterol for wheezing/SOB      CODE STATUS: DNI   Access: None. Planning for midline   Antibiotics: IV Zosyn (day 4)   Diet: Heart Healthy   DVT Prophylaxis: Lovenox 40 mg qd   GI Prophylaxis: On home PPI   Fluids: None    Disposition: Pending Course. D/C after completion of Zosyn x 5 days.     Nano Lind, MS4 [facility]

## 2022-09-15 NOTE — PLAN OF CARE
TRANSITION OF CARE PROGRESS NOTE    I have received checkout from Dr. Escobedo regarding this patient.     HO 1 assessment:  Admission diagnosis: Choledocholithiasis w/Cholangitis  Overnight management: currently receiving IV Zosyn for total of 5 day course as rec from GI; Prn pain control; on 10 mg Norvasc, monitor BP and vitals   Code status: Partial- No mechanical ventilation with intubation    Please call Dr. Holly at [phone] from 09/15/2022 4PM to 09/16/2022 7AM if any issues arise.    Lebron Manley MD    [facility] Resident HO-1

## 2022-09-16 ENCOUNTER — DOCUMENTATION ONLY (OUTPATIENT)
Dept: PHARMACY | Facility: HOSPITAL | Age: 62
End: 2022-09-16
Payer: MEDICAID

## 2022-09-16 LAB
ALBUMIN SERPL-MCNC: 3.6 GM/DL (ref 3.4–4.8)
ALBUMIN/GLOB SERPL: 0.9 RATIO (ref 1.1–2)
ALP SERPL-CCNC: 162 UNIT/L (ref 40–150)
ALT SERPL-CCNC: 148 UNIT/L (ref 0–55)
AST SERPL-CCNC: 43 UNIT/L (ref 5–34)
BILIRUBIN DIRECT+TOT PNL SERPL-MCNC: 1.3 MG/DL
BUN SERPL-MCNC: 14.2 MG/DL (ref 9.8–20.1)
CALCIUM SERPL-MCNC: 10.6 MG/DL (ref 8.4–10.2)
CHLORIDE SERPL-SCNC: 100 MMOL/L (ref 98–107)
CO2 SERPL-SCNC: 26 MMOL/L (ref 23–31)
CREAT SERPL-MCNC: 0.78 MG/DL (ref 0.55–1.02)
ERYTHROCYTE [DISTWIDTH] IN BLOOD BY AUTOMATED COUNT: 14.4 % (ref 11.5–17)
GFR SERPLBLD CREATININE-BSD FMLA CKD-EPI: >60 MLS/MIN/1.73/M2
GLOBULIN SER-MCNC: 4 GM/DL (ref 2.4–3.5)
GLUCOSE SERPL-MCNC: 97 MG/DL (ref 82–115)
HCT VFR BLD AUTO: 42.7 % (ref 37–47)
HGB BLD-MCNC: 13.8 GM/DL (ref 12–16)
MCH RBC QN AUTO: 29.1 PG (ref 27–31)
MCHC RBC AUTO-ENTMCNC: 32.3 MG/DL (ref 33–36)
MCV RBC AUTO: 89.9 FL (ref 80–94)
NRBC BLD AUTO-RTO: 0 %
PLATELET # BLD AUTO: 306 X10(3)/MCL (ref 130–400)
PMV BLD AUTO: 10.2 FL (ref 7.4–10.4)
POTASSIUM SERPL-SCNC: 3.6 MMOL/L (ref 3.5–5.1)
PROT SERPL-MCNC: 7.6 GM/DL (ref 5.8–7.6)
RBC # BLD AUTO: 4.75 X10(6)/MCL (ref 4.2–5.4)
SODIUM SERPL-SCNC: 140 MMOL/L (ref 136–145)
WBC # SPEC AUTO: 10.1 X10(3)/MCL (ref 4.5–11.5)

## 2022-09-16 PROCEDURE — 85027 COMPLETE CBC AUTOMATED: CPT | Performed by: STUDENT IN AN ORGANIZED HEALTH CARE EDUCATION/TRAINING PROGRAM

## 2022-09-16 PROCEDURE — 25000003 PHARM REV CODE 250: Performed by: STUDENT IN AN ORGANIZED HEALTH CARE EDUCATION/TRAINING PROGRAM

## 2022-09-16 PROCEDURE — 36415 COLL VENOUS BLD VENIPUNCTURE: CPT | Performed by: STUDENT IN AN ORGANIZED HEALTH CARE EDUCATION/TRAINING PROGRAM

## 2022-09-16 PROCEDURE — 63600175 PHARM REV CODE 636 W HCPCS: Performed by: STUDENT IN AN ORGANIZED HEALTH CARE EDUCATION/TRAINING PROGRAM

## 2022-09-16 PROCEDURE — 94760 N-INVAS EAR/PLS OXIMETRY 1: CPT

## 2022-09-16 PROCEDURE — C1751 CATH, INF, PER/CENT/MIDLINE: HCPCS

## 2022-09-16 PROCEDURE — 80053 COMPREHEN METABOLIC PANEL: CPT | Performed by: STUDENT IN AN ORGANIZED HEALTH CARE EDUCATION/TRAINING PROGRAM

## 2022-09-16 PROCEDURE — 21400001 HC TELEMETRY ROOM

## 2022-09-16 PROCEDURE — 36410 VNPNXR 3YR/> PHY/QHP DX/THER: CPT

## 2022-09-16 RX ORDER — SODIUM CHLORIDE 0.9 % (FLUSH) 0.9 %
10 SYRINGE (ML) INJECTION
Status: DISCONTINUED | OUTPATIENT
Start: 2022-09-16 | End: 2022-09-18 | Stop reason: HOSPADM

## 2022-09-16 RX ORDER — AMLODIPINE BESYLATE 10 MG/1
10 TABLET ORAL DAILY
Qty: 90 TABLET | Refills: 0 | Status: SHIPPED | OUTPATIENT
Start: 2022-09-17 | End: 2022-12-12

## 2022-09-16 RX ORDER — SODIUM CHLORIDE 0.9 % (FLUSH) 0.9 %
10 SYRINGE (ML) INJECTION EVERY 6 HOURS
Status: DISCONTINUED | OUTPATIENT
Start: 2022-09-16 | End: 2022-09-18 | Stop reason: HOSPADM

## 2022-09-16 RX ORDER — ESCITALOPRAM OXALATE 10 MG/1
10 TABLET ORAL DAILY
Qty: 90 TABLET | Refills: 0 | Status: SHIPPED | OUTPATIENT
Start: 2022-09-17 | End: 2022-12-12

## 2022-09-16 RX ADMIN — PIPERACILLIN AND TAZOBACTAM 4.5 G: 4; .5 INJECTION, POWDER, LYOPHILIZED, FOR SOLUTION INTRAVENOUS; PARENTERAL at 04:09

## 2022-09-16 RX ADMIN — ESCITALOPRAM OXALATE 10 MG: 10 TABLET, FILM COATED ORAL at 08:09

## 2022-09-16 RX ADMIN — AMLODIPINE BESYLATE 10 MG: 10 TABLET ORAL at 08:09

## 2022-09-16 NOTE — PROGRESS NOTES
[facility] received e-scripts for amlodipine and lexapro. Gave patient cost but she stated she has Medicaid although [facility] is unable to verify. Asked nurse to give her scripts to take to local pharmacy to fill if she can provide new Medicaid card to them.

## 2022-09-16 NOTE — PROGRESS NOTES
[facility] Progress Note     Resident Team: [facility]  Attending Physician: Steve Beach, *  Resident: Dr. Maureen Escobedo       Subjective:      Brief HPI:  Ms. Stallings is a 61 yo F w/ PMHx including HTN, HLD, Hiatal Hernia, GERD, Rectal Mass w/ High Grade Dysplasia, Osteoporosis, Asthma, Tobacco Use, Sciatica, Depression, Hx of R Lung Nodule,  Hx of Mucinous Ovarian Cystadenoma s/p DARINEL BSO who presented to [facility] ED on  w/ epigastric and LUQ pain. Pt found to have choledocholithiasis w/ overlying infection, s/p ERCP, sphincterotomy and biliary stent placement.     Interval History: IV access loss, pt didn't want replacement IV so missed 3 doses of abx. Pt amenable to midline and will attempt this AM. Pt also refused Lovenox. AF. Intermittent HTN to 150s. Intermittent Hypoxia to 93%. VSOS. Denies any abdominal pain. Denies n/v. Denies CP, SOB, light-headedness/dizziness, myalgias. Tolerated regular diet last PM. Voiding spontaneously. Having regular Pms. Ambulating w/in room. Slept well last night.     Review of Systems:  As noted in HPI      Objective:     Last 24 Hour Vital Signs:  BP  Min: 127/77  Max: 163/91  Temp  Av.8 °F (36.6 °C)  Min: 97.4 °F (36.3 °C)  Max: 98.3 °F (36.8 °C)  Pulse  Av.7  Min: 68  Max: 93  Resp  Av  Min: 18  Max: 18  SpO2  Av.3 %  Min: 93 %  Max: 97 %  I/O last 3 completed shifts:  In: 1076.7 [P.O.:900; IV Piggyback:176.7]  Out: -     Physical Examination:  Gen: In NAD, resting in bed   HEENT: PEERLA, EOMI, No scleral icterus   Neck: FROM, no thyromegally   Heart: RRR, No MRG   Lungs: CTAB, NLB   Abd: S/NTTP /ND/+normoactive BS. No guarding or rebound tenderness.   MSK: 5/5 strength bilat upper and lower extremity, FROM bilat upper and lower extremity   Neuro: CN II-XII intact grossly   Skin: No Juandice. Warm and Dry. Well perfused.     Laboratory:  Most Recent Data:  CBC:   Lab Results   Component Value Date    WBC 10.1  09/16/2022    HGB 13.8 09/16/2022    HCT 42.7 09/16/2022     09/16/2022    MCV 89.9 09/16/2022    RDW 14.4 09/16/2022     WBC Differential:   Recent Labs   Lab 09/12/22  1332 09/13/22  0400 09/14/22  0446 09/15/22  0429 09/16/22  0357   WBC 20.7* 20.3* 13.0* 9.9 10.1   HGB 12.6 11.3* 10.3* 11.2* 13.8   HCT 40.2 34.5* 32.9* 34.5* 42.7    260 230 251 306   MCV 93.9 90.6 91.6 89.8 89.9       BMP:   Lab Results   Component Value Date     09/16/2022    K 3.6 09/16/2022    CO2 26 09/16/2022    BUN 14.2 09/16/2022    CREATININE 0.78 09/16/2022    CALCIUM 10.6 (H) 09/16/2022    MG 1.80 09/14/2022    PHOS 3.2 09/14/2022     LFTs:   Lab Results   Component Value Date    ALBUMIN 3.6 09/16/2022    BILITOT 1.3 09/16/2022    AST 43 (H) 09/16/2022    ALKPHOS 162 (H) 09/16/2022     (H) 09/16/2022     (H) 09/13/2022     Coags:   Lab Results   Component Value Date    INR 1.09 09/13/2022    PROTIME 13.9 09/13/2022    PTT 30.4 09/13/2022     FLP:   Lab Results   Component Value Date    CHOL 240 (H) 08/24/2021    HDL 47 08/24/2021    TRIG 168 (H) 08/24/2021     DM:   Lab Results   Component Value Date    CREATININE 0.78 09/16/2022     Thyroid:   Lab Results   Component Value Date    TSH 0.1326 (L) 08/24/2021      Anemia: No results found for: IRON, TIBC, FERRITIN, SATURATEDIRO  No results found for: [id number]  No results found for: FOLATE     Cardiac: No results found for: TROPONINI, CKTOTAL, CKMB, BNP      Microbiology Data:  Microbiology Results (last 7 days)       Procedure Component Value Units Date/Time    Blood Culture #1 **CANNOT BE ORDERED STAT** [[id number]]  (Normal) Collected: 09/12/22 1640    Order Status: Completed Specimen: Blood Updated: 09/15/22 2100     CULTURE, BLOOD ([facility]) No Growth At 72 Hours    Blood Culture #2 **CANNOT BE ORDERED STAT** [[id number]]  (Normal) Collected: 09/12/22 1541    Order Status: Completed Specimen: Blood Updated: 09/15/22 2001     CULTURE, BLOOD ([facility]) No Growth  At 72 Hours             Other Results:  EKG (my interpretation): No new EKG.     Radiology:  Imaging Results              CT Abdomen Pelvis  Without Contrast (Final result)  Result time 09/12/22 22:06:12      Final result by Reza Mariano MD (09/12/22 22:06:12)                   Impression:      Pneumobilia.    Hiatal hernia.      Electronically signed by: Reza Mariano MD  Date:    09/12/2022  Time:    22:06               Narrative:    EXAMINATION:  CT ABDOMEN PELVIS WITHOUT CONTRAST    CLINICAL HISTORY:  Abdominal abscess/infection suspected;Abdominal pain, acute, nonlocalized;    TECHNIQUE:  Low dose axial images, sagittal and coronal reformations were obtained from the lung bases to the pubic symphysis.  No oral contrast was given.    Automatic exposure control (AEC) was utilized for dose reduction.    Dose: 226 mGycm    COMPARISON:  02/18/2022    FINDINGS:  There is a hiatal hernia present.  There is pneumobilia.  Spleen appears normal.  Pancreas appears normal.  The adrenals are not enlarged.  Kidneys appear normal.  Aorta shows calcification without an aneurysm present no distal ureteral lithiasis is seen there degenerative changes at L5-S1.  The appendix is not identified.    There are diverticulum in the colon without evidence of acute diverticulitis                                       US Abdomen Limited (Final result)  Result time 09/12/22 18:34:42      Final result by Jostin Scott MD (09/12/22 18:34:42)                   Impression:      Gallbladder not visualized.  Borderline dilatation of the common bile duct for a presumably post cholecystectomy patient.  There is underlying pneumobilia, but a 12 mm echogenic area in the distal common bile duct may be choledocholithiasis.  If needed, CT can be considered for further assessment.  The pneumobilia may significantly limit the quality of any MRCP images.      Electronically signed by: Jostin Scott  Date:    09/12/2022  Time:    18:34                Narrative:    EXAMINATION:  US ABDOMEN LIMITED    CLINICAL HISTORY:  hepatitis;    TECHNIQUE:  Gray-scale and color Doppler ultrasound images of the abdomen.    COMPARISON:  Ultrasound 03/04/2022    CT abdomen/pelvis 02/18/2022    FINDINGS:  No pancreatic abnormality appreciated.    Main portal vein patent with normal flow direction.  Normal caliber of the superior IVC.    Cirrhotic liver morphology.  Underlying pneumobilia.  Gallbladder not seen.  Oval echogenic area in the distal portion of the common bile duct near the pancreatic head measures 12 mm with posterior shadowing.  No significant ascites.    No hydronephrosis or defined calcification in the right kidney.    Measurements:    - Liver: 14.4 cm    - CBD diameter: 10 mm    - Right kidney: 10.1 cm in length                                       X-Ray Abdomen AP 1 View (Final result)  Result time 09/12/22 15:09:40      Final result by Syed Crandall MD (09/12/22 15:09:40)                   Impression:      Residual feces.    Nonspecific gas pattern      Electronically signed by: Syed Crandall  Date:    09/12/2022  Time:    15:09               Narrative:    EXAMINATION:  XR ABDOMEN AP 1 VIEW    CLINICAL HISTORY:  abdominal discomfort;    TECHNIQUE:  AP X-RAY OF THE ABDOMEN:    CPT 96523    FINDINGS:  Examination reveals residual feces throughout the colon gas pattern is nonspecific with no clear evidence of ileus or obstruction no abnormal masses or calcifications identified                                       X-Ray Chest 1 View (Final result)  Result time 09/12/22 14:41:09      Final result by Aziza Barry MD (09/12/22 14:41:09)                   Impression:      No acute abnormality of the chest.      Electronically signed by: Aziza Barry  Date:    09/12/2022  Time:    14:41               Narrative:    EXAMINATION:  XR CHEST 1 VIEW    CLINICAL HISTORY:  Unspecified abdominal pain    TECHNIQUE:  AP  chest    COMPARISON:  None.    FINDINGS:  The heart is normal in size.  The lungs are clear without focal consolidation.  There is no pleural effusion or visible pneumothorax.                                      Current Medications:     Infusions:         Scheduled:   amLODIPine  10 mg Oral Daily    enoxaparin  40 mg Subcutaneous Daily    EScitalopram oxalate  10 mg Oral Daily    pantoprozole (PROTONIX) IV  40 mg Intravenous Daily    piperacillin-tazobactam (ZOSYN) IVPB  4.5 g Intravenous Q8H        PRN:  albuterol sulfate, hydrALAZINE, ibuprofen, indomethacin, labetalol, melatonin, sodium chloride 0.9%    Antibiotics and Day Number of Therapy:  Zosyn (Day 4)   Lines and Day Number of Therapy:  None     Assessment & Plan:   Choledocholithiasis w/ Cholangitis   CBD Dilation to 10 mm w/ 12 mm Echogenic Focus w/in Distal CBD   Pneumobilia, chronic   Elevated Bilirubin   Elevated AST and ALT   Elevated ALK Phos   -s/p ERCP w/ sphincterotomy and stent placement. Will need GI f/u for stent removal and duct clearance in 4-6 weeks.   -Surgery consulted for cholecystectomy, they have decided that cholecystectomy not indicated. See note dated 9/15 for reasoning.   -Continue IV Zosyn, 5 day course of abx recommended by GI. PT missed approx 1 day of IV Zosyn, will need to continue tx.   -Liver and Biliary abnormalities will mild uptrend this Am but pt asymptomatic, Will CTM.   -PRN Pain Control     Sepsis (SIRS III/IV though transient) w/ suspected intraabdominal source, resolved.   -Leukocytosis relatively stable. Continue IV Zosyn as noted above. PT w/o clinical decline otherwise.    -Will f/u Blood Cultures, NG x 72 hours.     HTN  -Will continue Amlodipine 10 mg. Pt didn't want HCTZ 2/2 concern for increased urination. Will CTM BP while inpt and attempt to optimize.     Anemia:   H/H w/ downtrend initially, but now stable. Pt w/ benign abdominal exam, HDS and no gross signs of bleeding.     Hypercalcemia:   Elevation to  10.6 this AM. Will CTM.     Hypophosphatemia:   S/p IV repletion, resolved.     Hypokalemia:   WNL this AM s/p repletion, CTM.     Rectal Mass   -Pt continues to deny intervention. CT Abd/Pelvis done w/o contrast 2/2 iodine allergy and rectal exam deferred. No signs currently that rectal mass is direct cause of acute presentation.   -GI recommends repeat endoscopy to evaluate poly/mass. Will need to have established as oupt.     Hiatal Hernia   GERD   -Continue Protonix while inpt, pt takes PPI at home.   -Pt symptoms may have be partially related to biliary disease, CTM symptomatically as oupt.   -Continue oupt follow up as scheduled.     Depression   -Recommended SSRI initiation by pt PCP during inpt rounds, pt initially not amenable but began Lexapro 10 mg qd.     Sciatica:   Holding home Lyrica, it is prescribed PRN and pt unable to list home medications.     Osteoporosis:   Holding home Alendronate at this time as not on formulary. Will reinitiate if pt able to bring from home.     Tobacco Use:   Consider Nicotine patch while inpt, pt not amenable.     Asthma:   PRN Albuterol for wheezing/SOB     CODE STATUS: DNI   Access: None. Planning for midline.   Antibiotics: IV Zosyn (Day 4)    Diet: Heart Healthy   DVT Prophylaxis: Lovenox 40 mg qd   GI Prophylaxis: On home PPI   Fluids: None     Disposition: Pending Course . D/C after completion of Zosyn x 5 days.     Maureen Escobedo MD  [facility] HO-III

## 2022-09-16 NOTE — CARE UPDATE
addendum:    S:  Agree with above. Patient seen and states that she has been taking a break from the IV. She denies any abdominal pain, nausea or vomiting. She has been able to tolerate her food. She states that she does want medication for her depression.  Pt also requesting medication for sleep.    PE:  Constitutional: NAD  Lungs: CTAB, No crackles, No wheezes  Cardiovascular: Normal heart sounds, No MRG  Abdomen: Soft, Nontender, No palpable hepatosplenomegaly, No abdominal masses, No ascites  Extremities: No cyanosis, No clubbing, No edema      A/P:  Depression  Insomnia  as above;  -Recommend Trazodone 50 mg at night which may also help with the depression.   -Encouraged IV to be started. She may benefit from an midline placement to prevent repeated sticks.    Dispo: Continue IV antibiotics    Kyra Swartz MD, PGY-2  [facility]

## 2022-09-16 NOTE — PROCEDURES
"Lore Stallings is a 62 y.o. female patient.    Temp: 97.5 °F (36.4 °C) (09/16/22 1123)  Pulse: 93 (09/16/22 1123)  Resp: 18 (09/16/22 0900)  BP: 118/80 (09/16/22 1123)  SpO2: 97 % (09/16/22 1123)  Weight: 54.3 kg (119 lb 11.4 oz) (09/15/22 0400)  Height: 5' 5.98" (167.6 cm) (09/14/22 2022)    PICC  Time out: Immediately prior to procedure a time out was called to verify the correct patient, procedure, equipment, support staff and site/side marked as required  Indications: med administration  Preparation: skin prepped with ChloraPrep  Skin prep agent dried: skin prep agent completely dried prior to procedure  Sterile barriers: all five maximum sterile barriers used - cap, mask, sterile gown, sterile gloves, and large sterile sheet  Hand hygiene: hand hygiene performed prior to central venous catheter insertion  Location details: right basilic  Catheter type: single lumen  Catheter size: 4 Fr  Catheter Length: 9cm    Ultrasound guidance: yes  Needle advanced into vessel with real time Ultrasound guidance.  Guidewire confirmed in vessel.  Sterile sheath used.    Arm circumference 26 cm      Name Steve webster RN  9/16/2022    "

## 2022-09-16 NOTE — PLAN OF CARE
TRANSITION OF CARE PROGRESS NOTE    I have received checkout from Dr. Escobedo regarding this patient.     HO 1 assessment:  Admission diagnosis: Choledocholithiasis w/ Cholangitis  Overnight management: currently on day 4 of 5 of IV Zosyn, lost IV access with midline pending; on 10 mg Norvasc, monitor BP and vitals; PRN ibuprofen for pain; daily CBC, CMP  Code status: DNI    Please call Dr. Pickering at [phone] from 09/16/2022 4PM to 09/17/2022 7AM if any issues arise.    Johanna Brown MD  [facility] HO-1

## 2022-09-16 NOTE — CARE UPDATE
addendum:    S:  Agree with above. Pt in good spirits. Currently has the midline in place. Denies any fever, abd pain, nausea, vomiting, pain at insertion site, numbness or tingling to digits or arm. States that she was able to get the appropriate paperwork to the courthouse.        PE:  General: appears well, in no acute distress  Respiratory: course breath sounds bilaterally, with crackles at bases  Cardiovascular: regular rate and rhythm without murmurs, gallops or rubs; 2+ edema bilateral lower extremities  Gastrointestinal: soft, non-tender, non-distended, normoactive bowel sounds, no guarding, no rebound  Skin:midline site without erythema, irritation or tenderness     A/P:  as above.  Continue IV ABX therapy  Bp stable on amlodipine    Dispo: Continues to require abx and Hypertensive therapy    Kyra Swartz MD PGY II

## 2022-09-17 LAB
ALBUMIN SERPL-MCNC: 3.4 GM/DL (ref 3.4–4.8)
ALBUMIN/GLOB SERPL: 0.9 RATIO (ref 1.1–2)
ALP SERPL-CCNC: 145 UNIT/L (ref 40–150)
ALT SERPL-CCNC: 138 UNIT/L (ref 0–55)
AST SERPL-CCNC: 77 UNIT/L (ref 5–34)
BACTERIA BLD CULT: NORMAL
BACTERIA BLD CULT: NORMAL
BILIRUBIN DIRECT+TOT PNL SERPL-MCNC: 1.6 MG/DL
BUN SERPL-MCNC: 22.7 MG/DL (ref 9.8–20.1)
CALCIUM SERPL-MCNC: 9.7 MG/DL (ref 8.4–10.2)
CHLORIDE SERPL-SCNC: 102 MMOL/L (ref 98–107)
CO2 SERPL-SCNC: 25 MMOL/L (ref 23–31)
CREAT SERPL-MCNC: 0.75 MG/DL (ref 0.55–1.02)
ERYTHROCYTE [DISTWIDTH] IN BLOOD BY AUTOMATED COUNT: 14.1 % (ref 11.5–17)
GFR SERPLBLD CREATININE-BSD FMLA CKD-EPI: >60 MLS/MIN/1.73/M2
GLOBULIN SER-MCNC: 3.8 GM/DL (ref 2.4–3.5)
GLUCOSE SERPL-MCNC: 101 MG/DL (ref 82–115)
HCT VFR BLD AUTO: 43.4 % (ref 37–47)
HGB BLD-MCNC: 13.8 GM/DL (ref 12–16)
MCH RBC QN AUTO: 28.5 PG (ref 27–31)
MCHC RBC AUTO-ENTMCNC: 31.8 MG/DL (ref 33–36)
MCV RBC AUTO: 89.5 FL (ref 80–94)
NRBC BLD AUTO-RTO: 0 %
PLATELET # BLD AUTO: 317 X10(3)/MCL (ref 130–400)
PMV BLD AUTO: 10.1 FL (ref 7.4–10.4)
POTASSIUM SERPL-SCNC: 3.8 MMOL/L (ref 3.5–5.1)
PROT SERPL-MCNC: 7.2 GM/DL (ref 5.8–7.6)
RBC # BLD AUTO: 4.85 X10(6)/MCL (ref 4.2–5.4)
SODIUM SERPL-SCNC: 139 MMOL/L (ref 136–145)
WBC # SPEC AUTO: 10.4 X10(3)/MCL (ref 4.5–11.5)

## 2022-09-17 PROCEDURE — 21400001 HC TELEMETRY ROOM

## 2022-09-17 PROCEDURE — 25000003 PHARM REV CODE 250: Performed by: STUDENT IN AN ORGANIZED HEALTH CARE EDUCATION/TRAINING PROGRAM

## 2022-09-17 PROCEDURE — 63600175 PHARM REV CODE 636 W HCPCS: Performed by: STUDENT IN AN ORGANIZED HEALTH CARE EDUCATION/TRAINING PROGRAM

## 2022-09-17 PROCEDURE — 80053 COMPREHEN METABOLIC PANEL: CPT | Performed by: STUDENT IN AN ORGANIZED HEALTH CARE EDUCATION/TRAINING PROGRAM

## 2022-09-17 PROCEDURE — A4216 STERILE WATER/SALINE, 10 ML: HCPCS | Performed by: FAMILY MEDICINE

## 2022-09-17 PROCEDURE — 94760 N-INVAS EAR/PLS OXIMETRY 1: CPT

## 2022-09-17 PROCEDURE — 99900035 HC TECH TIME PER 15 MIN (STAT)

## 2022-09-17 PROCEDURE — 85027 COMPLETE CBC AUTOMATED: CPT | Performed by: STUDENT IN AN ORGANIZED HEALTH CARE EDUCATION/TRAINING PROGRAM

## 2022-09-17 PROCEDURE — 36415 COLL VENOUS BLD VENIPUNCTURE: CPT | Performed by: STUDENT IN AN ORGANIZED HEALTH CARE EDUCATION/TRAINING PROGRAM

## 2022-09-17 PROCEDURE — C9113 INJ PANTOPRAZOLE SODIUM, VIA: HCPCS | Performed by: STUDENT IN AN ORGANIZED HEALTH CARE EDUCATION/TRAINING PROGRAM

## 2022-09-17 PROCEDURE — 25000003 PHARM REV CODE 250: Performed by: FAMILY MEDICINE

## 2022-09-17 RX ADMIN — ESCITALOPRAM OXALATE 10 MG: 10 TABLET, FILM COATED ORAL at 08:09

## 2022-09-17 RX ADMIN — PIPERACILLIN AND TAZOBACTAM 4.5 G: 4; .5 INJECTION, POWDER, LYOPHILIZED, FOR SOLUTION INTRAVENOUS; PARENTERAL at 08:09

## 2022-09-17 RX ADMIN — PANTOPRAZOLE SODIUM 40 MG: 40 INJECTION, POWDER, FOR SOLUTION INTRAVENOUS at 09:09

## 2022-09-17 RX ADMIN — PIPERACILLIN AND TAZOBACTAM 4.5 G: 4; .5 INJECTION, POWDER, LYOPHILIZED, FOR SOLUTION INTRAVENOUS; PARENTERAL at 03:09

## 2022-09-17 RX ADMIN — AMLODIPINE BESYLATE 10 MG: 10 TABLET ORAL at 08:09

## 2022-09-17 RX ADMIN — SODIUM CHLORIDE, PRESERVATIVE FREE 10 ML: 5 INJECTION INTRAVENOUS at 12:09

## 2022-09-17 RX ADMIN — PIPERACILLIN AND TAZOBACTAM 4.5 G: 4; .5 INJECTION, POWDER, LYOPHILIZED, FOR SOLUTION INTRAVENOUS; PARENTERAL at 12:09

## 2022-09-17 RX ADMIN — Medication 10 ML: at 08:09

## 2022-09-17 NOTE — PROGRESS NOTES
[facility] Family Medicine Wards Progress Note     Resident Team: [facility] Family Medicine  Attending Physician: Steve Beach, *  Resident: Dr. Maureen Escobedo       Subjective:      Brief HPI:  Ms. Stallings is a 61 yo F w/ PMHx including HTN, HLD, Hiatal Hernia, GERD, Rectal Mass w/ High Grade Dysplasia, Osteoporosis, Asthma, Tobacco Use, Sciatica, Depression, Hx of R Lung Nodule,  Hx of Mucinous Ovarian Cystadenoma s/p DARINEL BSO who presented to [facility] ED on  w/ epigastric and LUQ pain. Pt found to have choledocholithiasis w/ overlying infection, s/p ERCP, sphincterotomy and biliary stent placement.     Interval History: No acute events overnight. Patient is tolerating PO intake, though has little appetite. Issues with IV access prompted insertion PICC line yesterday evening which delayed IV Abx administration. She denies CP, SOB, abdominal pain, constipation, diarrhea, nausea, vomiting, edema.     Review of Systems:  As noted in HPI      Objective:     Last 24 Hour Vital Signs:  BP  Min: 118/80  Max: 145/86  Temp  Av °F (36.7 °C)  Min: 97.5 °F (36.4 °C)  Max: 98.3 °F (36.8 °C)  Pulse  Av.4  Min: 77  Max: 93  Resp  Av  Min: 18  Max: 18  SpO2  Av.4 %  Min: 93 %  Max: 97 %  I/O last 3 completed shifts:  In: 180 [P.O.:180]  Out: -     Physical Examination:  Gen: Resting comfortably in bed, NAD  HEENT: PEERLA, EOMI, No scleral icterus   Heart: RRR, no edema   Lungs: breathing non labored, lungs CTAB  Abd: Large vert midline scar. Abdomen soft, bowel sounds present. No guarding or rebound.   MSK: 5/5 strength bilat upper and lower extremity  Neuro: AAOx3, intact gross sensory and motor function  Skin: Warm, dry, no rashes     Laboratory:    WBC Differential:   Recent Labs   Lab 22  0400 22  0446 09/15/22  0429 22  0357 22  0541   WBC 20.3* 13.0* 9.9 10.1 10.4   HGB 11.3* 10.3* 11.2* 13.8 13.8   HCT 34.5* 32.9* 34.5* 42.7 43.4    230 251 306 317   MCV 90.6 91.6 89.8 89.9  89.5     BMP:   Lab Results   Component Value Date     09/17/2022    K 3.8 09/17/2022    CO2 25 09/17/2022    BUN 22.7 (H) 09/17/2022    CREATININE 0.75 09/17/2022    CALCIUM 9.7 09/17/2022    MG 1.80 09/14/2022    PHOS 3.2 09/14/2022     LFTs:   Lab Results   Component Value Date    ALBUMIN 3.4 09/17/2022    BILITOT 1.6 (H) 09/17/2022    AST 77 (H) 09/17/2022    ALKPHOS 145 09/17/2022     (H) 09/17/2022     (H) 09/13/2022       Other Results:  EKG (my interpretation): No new EKG.     Radiology:  No new imaging studies       Current Medications:       amLODIPine  10 mg Oral Daily    enoxaparin  40 mg Subcutaneous Daily    EScitalopram oxalate  10 mg Oral Daily    pantoprozole (PROTONIX) IV  40 mg Intravenous Daily    piperacillin-tazobactam (ZOSYN) IVPB  4.5 g Intravenous Q8H    sodium chloride 0.9%  10 mL Intravenous Q6H        PRN:  albuterol sulfate, hydrALAZINE, ibuprofen, indomethacin, labetalol, melatonin, sodium chloride 0.9%, Flushing PICC Protocol **AND** sodium chloride 0.9% **AND** sodium chloride 0.9%    Antibiotics and Day Number of Therapy:  Zosyn (Day 4)     Lines and Day Number of Therapy:  PICC line right, day 1    Assessment & Plan:   Choledocholithiasis w/ Cholangitis   CBD Dilation to 10 mm w/ 12 mm Echogenic Focus w/in Distal CBD   Pneumobilia, chronic   Elevated Bilirubin   Elevated AST and ALT   Elevated ALK Phos   -s/p ERCP w/ sphincterotomy and stent placement. Follow up with GI fro removal  -Surgery consulted, Jazmin not indicated. See note  -Continue IV Zosyn day 4, due to complete 9/18/22 PM  -Liver and Biliary abnormalities stable, will continue to trend  -PRN Pain Control     Sepsis (SIRS III/IV though transient) w/ suspected intraabdominal source, resolved.   - WBC stable  - BC NG at 96 hours  - Continue IV Abx     HTN  - Well controlled  - Continue Amlodipine 10 mg    Anemia  stable      Hypercalcemia  Hypophosphatemia  Hypokalemia  - resolved, will continue to  monitor     Rectal Mass   -Pt continues to deny intervention. CT Abd/Pelvis done w/o contrast 2/2 iodine allergy and rectal exam deferred. No signs currently that rectal mass is direct cause of acute presentation.   -GI recommends repeat endoscopy to evaluate poly/mass. Will need to have established as oupt.     Hiatal Hernia   GERD   - Likely 2/2 biliary disease  -Continue Protonix  -Continue oupt follow up as scheduled    Depression   - Continue Lexapro 10 mg qd.     Sciatica:   Holding home Lyrica, denies pain    Osteoporosis:   Holding home Alendronate, not on formulary    Tobacco Use:   - Denies desire for nicotine patch    Asthma:   PRN Albuterol for wheezing/SOB     CODE STATUS: DNI   Access: PICC  Antibiotics: IV Zosyn (Day 4)    Diet: Heart Healthy   DVT Prophylaxis: Lovenox 40 mg qd   GI Prophylaxis: On home PPI   Fluids: None     Disposition: D/C home after completion IV Abx x 3 doses evening of 9/18/22, or AM 9/19/22 pending course    Riaz Pickering MD  [facility]

## 2022-09-18 VITALS
HEART RATE: 81 BPM | OXYGEN SATURATION: 96 % | RESPIRATION RATE: 16 BRPM | TEMPERATURE: 98 F | DIASTOLIC BLOOD PRESSURE: 74 MMHG | HEIGHT: 66 IN | SYSTOLIC BLOOD PRESSURE: 145 MMHG | BODY MASS INDEX: 19.24 KG/M2 | WEIGHT: 119.69 LBS

## 2022-09-18 PROBLEM — K83.09 CHOLANGITIS: Status: ACTIVE | Noted: 2022-09-18

## 2022-09-18 PROBLEM — K80.50 CHOLEDOCHOLITHIASIS: Status: ACTIVE | Noted: 2022-09-18

## 2022-09-18 LAB
ALBUMIN SERPL-MCNC: 3.3 GM/DL (ref 3.4–4.8)
ALBUMIN/GLOB SERPL: 0.9 RATIO (ref 1.1–2)
ALP SERPL-CCNC: 121 UNIT/L (ref 40–150)
ALT SERPL-CCNC: 100 UNIT/L (ref 0–55)
AST SERPL-CCNC: 38 UNIT/L (ref 5–34)
BILIRUBIN DIRECT+TOT PNL SERPL-MCNC: 0.9 MG/DL
BUN SERPL-MCNC: 21.6 MG/DL (ref 9.8–20.1)
CALCIUM SERPL-MCNC: 9.3 MG/DL (ref 8.4–10.2)
CHLORIDE SERPL-SCNC: 105 MMOL/L (ref 98–107)
CO2 SERPL-SCNC: 27 MMOL/L (ref 23–31)
CREAT SERPL-MCNC: 0.79 MG/DL (ref 0.55–1.02)
ERYTHROCYTE [DISTWIDTH] IN BLOOD BY AUTOMATED COUNT: 13.9 % (ref 11.5–17)
GFR SERPLBLD CREATININE-BSD FMLA CKD-EPI: >60 MLS/MIN/1.73/M2
GLOBULIN SER-MCNC: 3.5 GM/DL (ref 2.4–3.5)
GLUCOSE SERPL-MCNC: 107 MG/DL (ref 82–115)
HCT VFR BLD AUTO: 41.8 % (ref 37–47)
HGB BLD-MCNC: 13.1 GM/DL (ref 12–16)
MCH RBC QN AUTO: 28.7 PG (ref 27–31)
MCHC RBC AUTO-ENTMCNC: 31.3 MG/DL (ref 33–36)
MCV RBC AUTO: 91.7 FL (ref 80–94)
NRBC BLD AUTO-RTO: 0 %
PLATELET # BLD AUTO: 349 X10(3)/MCL (ref 130–400)
PMV BLD AUTO: 9.5 FL (ref 7.4–10.4)
POTASSIUM SERPL-SCNC: 4.2 MMOL/L (ref 3.5–5.1)
PROT SERPL-MCNC: 6.8 GM/DL (ref 5.8–7.6)
RBC # BLD AUTO: 4.56 X10(6)/MCL (ref 4.2–5.4)
SODIUM SERPL-SCNC: 142 MMOL/L (ref 136–145)
WBC # SPEC AUTO: 12.1 X10(3)/MCL (ref 4.5–11.5)

## 2022-09-18 PROCEDURE — 36415 COLL VENOUS BLD VENIPUNCTURE: CPT | Performed by: STUDENT IN AN ORGANIZED HEALTH CARE EDUCATION/TRAINING PROGRAM

## 2022-09-18 PROCEDURE — 63600175 PHARM REV CODE 636 W HCPCS: Performed by: STUDENT IN AN ORGANIZED HEALTH CARE EDUCATION/TRAINING PROGRAM

## 2022-09-18 PROCEDURE — 25000003 PHARM REV CODE 250: Performed by: STUDENT IN AN ORGANIZED HEALTH CARE EDUCATION/TRAINING PROGRAM

## 2022-09-18 PROCEDURE — 85027 COMPLETE CBC AUTOMATED: CPT | Performed by: STUDENT IN AN ORGANIZED HEALTH CARE EDUCATION/TRAINING PROGRAM

## 2022-09-18 PROCEDURE — C9113 INJ PANTOPRAZOLE SODIUM, VIA: HCPCS | Performed by: STUDENT IN AN ORGANIZED HEALTH CARE EDUCATION/TRAINING PROGRAM

## 2022-09-18 PROCEDURE — 80053 COMPREHEN METABOLIC PANEL: CPT | Performed by: STUDENT IN AN ORGANIZED HEALTH CARE EDUCATION/TRAINING PROGRAM

## 2022-09-18 PROCEDURE — 99900035 HC TECH TIME PER 15 MIN (STAT)

## 2022-09-18 PROCEDURE — 94760 N-INVAS EAR/PLS OXIMETRY 1: CPT

## 2022-09-18 PROCEDURE — 94761 N-INVAS EAR/PLS OXIMETRY MLT: CPT

## 2022-09-18 RX ADMIN — PIPERACILLIN AND TAZOBACTAM 4.5 G: 4; .5 INJECTION, POWDER, LYOPHILIZED, FOR SOLUTION INTRAVENOUS; PARENTERAL at 12:09

## 2022-09-18 RX ADMIN — PIPERACILLIN AND TAZOBACTAM 4.5 G: 4; .5 INJECTION, POWDER, LYOPHILIZED, FOR SOLUTION INTRAVENOUS; PARENTERAL at 08:09

## 2022-09-18 RX ADMIN — AMLODIPINE BESYLATE 10 MG: 10 TABLET ORAL at 08:09

## 2022-09-18 RX ADMIN — ESCITALOPRAM OXALATE 10 MG: 10 TABLET, FILM COATED ORAL at 08:09

## 2022-09-18 RX ADMIN — PIPERACILLIN AND TAZOBACTAM 4.5 G: 4; .5 INJECTION, POWDER, LYOPHILIZED, FOR SOLUTION INTRAVENOUS; PARENTERAL at 04:09

## 2022-09-18 RX ADMIN — PANTOPRAZOLE SODIUM 40 MG: 40 INJECTION, POWDER, FOR SOLUTION INTRAVENOUS at 09:09

## 2022-09-18 NOTE — NURSING
Pt was given discharge instructions and verbalized understanding. Midline removed at this time. Pt stated that she will be waiting for a ride.

## 2022-09-18 NOTE — DISCHARGE SUMMARY
[facility] - [address] [place] [address] Medicine  Discharge Summary      Patient Name: Lore Stallings  MRN: [id number]  Patient Class: IP- Inpatient  Admission Date: 9/12/2022  Hospital Length of Stay: 6 days  Discharge Date and Time:  09/18/2022 12:50 PM  Attending Physician: Steve Beach, *   Discharging Provider: Johanna Brown MD  Primary Care Provider: Kyra Reed MD      HPI:   Pt with PMH of GERD, hiatal hernia, rectal mass presented to ED with abdominal pain. Reports the pain woke her up 0300 on 9/12 due to severe epigastric and LUQ pain that she attributes to her hiatal hernia. The pain was intense initially then decreased throughout the morning and remains mild at admission. No alleviating factors. Does not worsen with food intake or bowel movements. Associated symptoms include nausea and one episode of vomiting. Denies fever, chills, CP, SOB, diarrhea, constipation. She denies alcohol abuse, stating occasional wine every 6mo, last etoh intake was 3 days ago. In ED, etoh negative, UDS positive for cannabinoids. CBC significant for elevated WBC at 20.7k. Hepatitis panel negative. On U/S, gallbladder not visualized, with concern for choledocholithiasis. Pt is unsure if she has a gallbladder. She has hx of abdominal surgery with total hysterectomy b/l SBO for adnexal mass as well as an appendectomy. She has been evaluated by surgery on multiple occasions and declines intervention for her rectal mass since 03/2022. She has previous biopsy of unknown date showing high grade dysplasia of rectal mass as reported on MRI of rectal cancer on 3/17/2022.       Procedure(s) (LRB):  ERCP, WITH SPHINCTEROTOMY (N/A)      Hospital Course:   Patient was admitted to inpatient Family Medicine service for further work up and imaging, given unknown possible infection source and possible progression of known mass. She was placed on 5 day course of IV Zosyn for suspected intraabdominal source  with leukocytosis, and on PRN pain control. Pt was found to have choledocholithiasis w/ overlying infection; she received ERCP, sphincterotomy and biliary stent placement per GI recs. Surgery was consulted for cholecystectomy, they have decided that since GB not visualized on most of imaging that they will not offer surgical intervention.   She declined intervention for rectal mass. CT Abd/Pelvis was done w/o contrast and rectal exam was deferred. There were no signs during her inpatient stay that the rectal mass was a direct cause of her acute presentation. Throughout her stay, she remained stable, and her liver and biliary abnormalities improved. She was started on lexapro for depression. On day of discharge, she was ambulating, without pain or any complaints, tolerating regular diet, and voiding spontaneously. She will be discharged home after last dose of her IV antibiotics, with GI f/u for stent removal and duct clearance in 4-6 weeks, as well as close follow up with her PCP on 9/28/22. All questions and concerns were addressed throughout the course of the patient's stay.            Goals of Care Treatment Preferences:  Code Status: Partial Code    Physical Exam:  Vitals:    09/18/22 1158   BP: (!) 150/82   Pulse: 79   Resp:    Temp: 97.8 °F (36.6 °C)   Gen: Resting comfortably in bed, NAD  HEENT: PEERLA, EOMI, No scleral icterus   Heart: RRR, no edema   Lungs: breathing non labored, lungs CTAB  Abd: Large vert midline scar. Abdomen soft, bowel sounds present. No guarding or rebound.   MSK: 5/5 strength bilat upper and lower extremity  Neuro: AAOx3, intact gross sensory and motor function  Skin: Warm, dry, no rashes     Consults:   Consults (From admission, onward)        Status Ordering Provider     Inpatient consult to Midline team  Once        Provider:  (Not yet assigned)    Acknowledged SUNSHINE GARCIA     Inpatient consult to Social Work/Case Management  Once        Provider:  (Not yet assigned)     Completed SUNSHINE GARCIA     Inpatient consult to General Surgery  Once        Provider:  Yuan Starkey Jr., MD    Completed SUNSHINE GARCIA     Inpatient consult to Gastroenterology  Once        Provider:  Iris Vazquez    Completed SUNSHINE GARCIA     Inpatient consult to Hospitalist  Once        Provider:  Tosin Holly MD    Acknowledged EUNICE GREENWOOD          No new Assessment & Plan notes have been filed under this hospital service since the last note was generated.  Service: Hospital Medicine    Final Active Diagnoses:    Diagnosis Date Noted POA    PRINCIPAL PROBLEM:  Transaminitis [R74.01] 06/28/2022 Yes    Choledocholithiasis [K80.50] 09/18/2022 Yes    Cholangitis [K83.09] 09/18/2022 Yes    Systemic inflammatory response syndrome (SIRS) [R65.10] 09/12/2022 Yes    Hypophosphatemia [E83.39] 09/12/2022 Yes    Hypertension [I10] 09/05/2022 Yes    Hiatal hernia [K44.9] 06/28/2022 Yes    Gastroesophageal reflux disease [K21.9] 06/28/2022 Yes      Problems Resolved During this Admission:       Discharged Condition: stable    Disposition: Home or Self Care    Follow Up:   Follow-up Information     [facility] - Family Medicine Follow up in 2 week(s).    Specialty: Family Medicine  Why: Within 1-2 weeks. Office will call to schedule appointment  Contact information:  [address] [place] [id number] [phone]                     Go to your scheduled appointment with GI to get stent removal on 11/2/22.    Significant Diagnostic Studies: Labs:   CMP   Recent Labs   Lab 09/17/22  0541 09/18/22  0510    142   K 3.8 4.2   CO2 25 27   BUN 22.7* 21.6*   CREATININE 0.75 0.79   CALCIUM 9.7 9.3   ALBUMIN 3.4 3.3*   BILITOT 1.6* 0.9   ALKPHOS 145 121   AST 77* 38*   * 100*    and CBC   Recent Labs   Lab 09/17/22  0541 09/18/22  0510   WBC 10.4 12.1*   HGB 13.8 13.1   HCT 43.4 41.8    349     US ABDOMEN LIMITED 9/12/22  Gallbladder not  visualized.  Borderline dilatation of the common bile duct for a presumably post cholecystectomy patient.  There is underlying pneumobilia, but a 12 mm echogenic area in the distal common bile duct may be choledocholithiasis.  If needed, CT can be considered for further assessment.  The pneumobilia may significantly limit the quality of any MRCP images.    CT ABDOMEN PELVIS WITHOUT CONTRAST 9/12/22  There is a hiatal hernia present.  There is pneumobilia.  Spleen appears normal.  Pancreas appears normal.  The adrenals are not enlarged.  Kidneys appear normal.  Aorta shows calcification without an aneurysm present no distal ureteral lithiasis is seen there degenerative changes at L5-S1.  The appendix is not identified.  There are diverticulum in the colon without evidence of acute diverticulitis       FL ERCP BILIARY AND PANCREATIC 9/13/22  FINDINGS:  ERCP images demonstrate cannulation of the common bile duct and contrast injection. Choledocholithiasis.  Placement of a common bile duct stent noted.    Pending Diagnostic Studies:     None         Medications:  Reconciled Home Medications:      Medication List      START taking these medications    amLODIPine 10 MG tablet  Commonly known as: NORVASC  Take 1 tablet (10 mg total) by mouth once daily.     EScitalopram oxalate 10 MG tablet  Commonly known as: LEXAPRO  Take 1 tablet (10 mg total) by mouth once daily.        CONTINUE taking these medications    alendronate 10 MG Tab  Commonly known as: FOSAMAX  Take 10 mg by mouth once daily.     cyclobenzaprine 10 MG tablet  Commonly known as: FLEXERIL  Take 1 tablet (10 mg total) by mouth 3 (three) times daily as needed for Muscle spasms.     omeprazole 20 MG capsule  Commonly known as: PRILOSEC  Take 1 capsule (20 mg total) by mouth once daily.     ondansetron 4 MG Tbdl  Commonly known as: ZOFRAN-ODT  Take 2 tablets (8 mg total) by mouth nightly as needed (nausea).     pregabalin 75 MG capsule  Commonly known as:  LYRICA  Take 1 capsule (75 mg total) by mouth nightly as needed (sciatica).     PROAIR HFA 90 mcg/actuation inhaler  Generic drug: albuterol  Inhale 2 puffs into the lungs every 4 (four) hours as needed.        STOP taking these medications    hydroCHLOROthiazide 12.5 MG Tab  Commonly known as: HYDRODIURIL     oxyCODONE-acetaminophen 7.5-325 mg per tablet  Commonly known as: PERCOCET            Indwelling Lines/Drains at time of discharge:   Lines/Drains/Airways     None                 Time spent on the discharge of patient: 31 minutes         Johanna Brown MD  Department of Hospital Medicine  [facility] Surg Telemetry

## 2022-09-18 NOTE — HOSPITAL COURSE
Patient was admitted to inpatient Family Medicine service for further work up and imaging, given unknown possible infection source and possible progression of known mass. She was placed on 5 day course of IV Zosyn for suspected intraabdominal source with leukocytosis, and on PRN pain control. Pt was found to have choledocholithiasis w/ overlying infection; she received ERCP, sphincterotomy and biliary stent placement per GI recs. Surgery was consulted for cholecystectomy, they have decided that since GB not visualized on most of imaging that they will not offer surgical intervention.   She declined intervention for rectal mass. CT Abd/Pelvis was done w/o contrast and rectal exam was deferred. There were no signs during her inpatient stay that the rectal mass was a direct cause of her acute presentation. Throughout her stay, she remained stable, and her liver and biliary abnormalities improved. She was started on lexapro for depression. On day of discharge, she was ambulating, without pain or any complaints, tolerating regular diet, and voiding spontaneously. She will be discharged home after last dose of her IV antibiotics, with GI f/u for stent removal and duct clearance in 4-6 weeks, as well as close follow up with her PCP on 9/28/22. All questions and concerns were addressed throughout the course of the patient's stay.

## 2022-09-18 NOTE — PLAN OF CARE
Problem: Adult Inpatient Plan of Care  Goal: Plan of Care Review  Outcome: Ongoing, Progressing     Problem: Adult Inpatient Plan of Care  Goal: Patient-Specific Goal (Individualized)  Outcome: Ongoing, Progressing     Problem: Fall Injury Risk  Goal: Absence of Fall and Fall-Related Injury  Outcome: Ongoing, Progressing     Problem: Infection  Goal: Absence of Infection Signs and Symptoms  Outcome: Ongoing, Progressing

## 2022-10-03 NOTE — PROGRESS NOTES
Faculty Attestation: Lore Stallings  was seen in Family Medicine Clinic. Patient chart reviewed. History of Present Illness, Physical Exam, and Assessment and Plan reviewed. Treatment plan is reasonable and appropriate. Compliance with treatment recommendations is important.  No imaging studies were done today.  No procedure was performed.     Nathen Crocker MD

## 2022-10-18 ENCOUNTER — OFFICE VISIT (OUTPATIENT)
Dept: FAMILY MEDICINE | Facility: CLINIC | Age: 62
End: 2022-10-18
Payer: MEDICAID

## 2022-10-18 VITALS
WEIGHT: 119.94 LBS | BODY MASS INDEX: 19.27 KG/M2 | DIASTOLIC BLOOD PRESSURE: 77 MMHG | SYSTOLIC BLOOD PRESSURE: 136 MMHG | TEMPERATURE: 98 F | OXYGEN SATURATION: 100 % | HEART RATE: 102 BPM | HEIGHT: 66 IN

## 2022-10-18 DIAGNOSIS — M25.469 TRAUMATIC EFFUSION OF KNEE JOINT: ICD-10-CM

## 2022-10-18 DIAGNOSIS — T14.8XXA CONTUSION OF BONE: Primary | ICD-10-CM

## 2022-10-18 PROCEDURE — 99213 OFFICE O/P EST LOW 20 MIN: CPT | Mod: PBBFAC | Performed by: NURSE PRACTITIONER

## 2022-10-18 NOTE — PROGRESS NOTES
"  Family Medicine Clinic Note:    PCP: Kyra Reed MD    Lore Stallings is a 62 y.o. female presents to clinic today for f/up       Subjective:   Knee   -fell second time on the knee and tripped on the right knee   -Fell directly on the right knee   -Difficulty getting up from a sitting position   -Knee is giving out without notice   -Has been taking tylenol  -Denies clicking or popping but unstable on the knee      Hiatal hernia  -Has not received the surgery yet  -states has new insurance (medicaid)  -no change in symptoms    ROS  Constitutional: No fevers, chills or fatigue  Respiratory: no trouble breathing or SOB  Cardiovascular: no chest pain or tightness, no SOB on activity, no ankle swelling  Musculoskeletal: see HPI    Current Outpatient Medications   Medication Sig Dispense Refill    alendronate (FOSAMAX) 10 MG Tab Take 10 mg by mouth once daily.      amLODIPine (NORVASC) 10 MG tablet Take 1 tablet (10 mg total) by mouth once daily. 90 tablet 0    cyclobenzaprine (FLEXERIL) 10 MG tablet Take 1 tablet (10 mg total) by mouth 3 (three) times daily as needed for Muscle spasms. 90 tablet 2    EScitalopram oxalate (LEXAPRO) 10 MG tablet Take 1 tablet (10 mg total) by mouth once daily. 90 tablet 0    omeprazole (PRILOSEC) 20 MG capsule Take 1 capsule (20 mg total) by mouth once daily. 30 capsule 11    ondansetron (ZOFRAN-ODT) 4 MG TbDL Take 2 tablets (8 mg total) by mouth nightly as needed (nausea). 30 tablet 2    pregabalin (LYRICA) 75 MG capsule Take 1 capsule (75 mg total) by mouth nightly as needed (sciatica). 30 capsule 2    PROAIR HFA 90 mcg/actuation inhaler Inhale 2 puffs into the lungs every 4 (four) hours as needed. 18 g 11     No current facility-administered medications for this visit.       Objective:     /77 (BP Location: Left arm, Patient Position: Sitting, BP Method: Medium (Automatic))   Pulse 102   Temp 98.2 °F (36.8 °C) (Oral)   Ht 5' 6" (1.676 m)   Wt 54.4 kg (119 lb " 14.9 oz)   SpO2 100%   BMI 19.36 kg/m²   BP Readings from Last 3 Encounters:   10/18/22 136/77   10/10/22 136/76   09/18/22 (!) 145/74     Wt Readings from Last 3 Encounters:   10/18/22 54.4 kg (119 lb 14.9 oz)   10/10/22 54 kg (119 lb)   09/15/22 54.3 kg (119 lb 11.4 oz)     No results found for this or any previous visit (from the past 200 hour(s)).  Body mass index is 19.36 kg/m².    Physical Exam  Constitutional: NAD  Lungs: CTAB, No crackles, No wheezes  Cardiovascular: Normal heart sounds, No MRG  Abdomen: Soft, Nontender, No palpable hepatosplenomegaly,   MSK: Limited extension. Palpable effusion.  Anterior drawer negative, posterior drawer negative, negative Kota's son. Tenderness along the femoral patellar tendon on the medial side of the right knee. Attempted to do the Apley's maneuver and pt was unable to perform. Increased apprehension noed.   Difficulty with bearing weight and especially pivoting.        Assessment:   Lore Stallings is a 62 y.o. female with:    1. Contusion of bone    2. Traumatic effusion of knee joint        Plan:   Trauma to right knee 2/2 fall  Contusion to right knee  Effusion to right knee and tenderness along the femoral patellar tendon on the medial side of the knee noted due to re injury of the right knee  Previous  imaging of the right knee  were reviewed again at this visit with the patient and negative  Okay to use tylenol prn and heating pad to the knee  Will repeat the x-ray right knee (Four views standing AP, lateral PA tunnel. [place]/merchant view)  Suspect meniscal tear at this time, especially with the instability of the knee. Will need advanced imaging.  Ordered MRI right knee        Hiatal Hernia   Will have surgery rescend the referal to Dr. Weaver at [facility]'s office  since pt has new insurance (medicaid)    RTC in 4 weeks    Kyra Swartz MD, PGY-2  [facility] Family Medicine          Follow up in about 4 weeks (around 11/15/2022) for right knee pain  2/2 trauma to knee.    Future Appointments   Date Time Provider Department Center   11/10/2022  1:00 PM RESIDENT 13, [facility] FAMILY MEDICINE [facility] FM RES [place]   3/27/2023  1:10 PM ABDI [place] [facility] G[place]   10/10/2023  1:40 PM NURSE PRACTITIONER, [facility]       Staff: Dr. Obi Swartz MD  Family Medicine PGY-2

## 2022-10-19 NOTE — PROGRESS NOTES
Faculty Attestation: Lore Stallings  was seen in Family Medicine Clinic. Patient seen and evaluated at the time of the visit. History of Present Illness, Physical Exam, and Assessment and Plan reviewed. Treatment plan is reasonable and appropriate. Compliance with treatment recommendations is important.  Radiology images independently reviewed and agree with radiologist interpretation.  No procedure was performed.     Nathen Crocker MD

## 2022-10-26 DIAGNOSIS — T14.8XXA FRACTURE: Primary | ICD-10-CM

## 2022-10-31 ENCOUNTER — DOCUMENTATION ONLY (OUTPATIENT)
Dept: FAMILY MEDICINE | Facility: CLINIC | Age: 62
End: 2022-10-31
Payer: MEDICAID

## 2022-11-01 DIAGNOSIS — K80.50 CHOLEDOCHOLITHIASIS: Primary | ICD-10-CM

## 2022-11-10 ENCOUNTER — TELEPHONE (OUTPATIENT)
Dept: FAMILY MEDICINE | Facility: CLINIC | Age: 62
End: 2022-11-10

## 2022-11-10 NOTE — TELEPHONE ENCOUNTER
Patient care giver call, patient is complaining of nausea and chest pain.patient states she was to have stent removed, but no one called her.  Instructed patient to go to ER.

## 2022-11-11 ENCOUNTER — HOSPITAL ENCOUNTER (EMERGENCY)
Facility: HOSPITAL | Age: 62
Discharge: HOME | End: 2022-11-11
Attending: INTERNAL MEDICINE
Payer: MEDICAID

## 2022-11-11 ENCOUNTER — TELEPHONE (OUTPATIENT)
Dept: PLASTIC SURGERY | Facility: CLINIC | Age: 62
End: 2022-11-11
Payer: MEDICAID

## 2022-11-11 VITALS
DIASTOLIC BLOOD PRESSURE: 89 MMHG | OXYGEN SATURATION: 96 % | RESPIRATION RATE: 18 BRPM | SYSTOLIC BLOOD PRESSURE: 164 MMHG | WEIGHT: 122.94 LBS | BODY MASS INDEX: 19.29 KG/M2 | TEMPERATURE: 97 F | HEART RATE: 94 BPM | HEIGHT: 67 IN

## 2022-11-11 DIAGNOSIS — R05.9 COUGH: ICD-10-CM

## 2022-11-11 DIAGNOSIS — K29.70 GASTRITIS, PRESENCE OF BLEEDING UNSPECIFIED, UNSPECIFIED CHRONICITY, UNSPECIFIED GASTRITIS TYPE: Primary | ICD-10-CM

## 2022-11-11 DIAGNOSIS — K44.9 HIATAL HERNIA: Primary | ICD-10-CM

## 2022-11-11 DIAGNOSIS — R10.13 EPIGASTRIC PAIN: ICD-10-CM

## 2022-11-11 LAB
ALBUMIN SERPL-MCNC: 3.6 GM/DL (ref 3.4–4.8)
ALBUMIN/GLOB SERPL: 1.1 RATIO (ref 1.1–2)
ALP SERPL-CCNC: 83 UNIT/L (ref 40–150)
ALT SERPL-CCNC: 10 UNIT/L (ref 0–55)
AST SERPL-CCNC: 16 UNIT/L (ref 5–34)
BASOPHILS # BLD AUTO: 0.03 X10(3)/MCL (ref 0–0.2)
BASOPHILS NFR BLD AUTO: 0.5 %
BILIRUBIN DIRECT+TOT PNL SERPL-MCNC: 0.2 MG/DL (ref 0–0.5)
BILIRUBIN DIRECT+TOT PNL SERPL-MCNC: 0.6 MG/DL
BUN SERPL-MCNC: 7.5 MG/DL (ref 9.8–20.1)
CALCIUM SERPL-MCNC: 8.8 MG/DL (ref 8.4–10.2)
CHLORIDE SERPL-SCNC: 106 MMOL/L (ref 98–107)
CK SERPL-CCNC: 84 U/L (ref 29–168)
CO2 SERPL-SCNC: 29 MMOL/L (ref 23–31)
CREAT SERPL-MCNC: 0.67 MG/DL (ref 0.55–1.02)
EOSINOPHIL # BLD AUTO: 0.2 X10(3)/MCL (ref 0–0.9)
EOSINOPHIL NFR BLD AUTO: 3.2 %
ERYTHROCYTE [DISTWIDTH] IN BLOOD BY AUTOMATED COUNT: 14.1 % (ref 11.5–17)
GFR SERPLBLD CREATININE-BSD FMLA CKD-EPI: >60 MLS/MIN/1.73/M2
GLOBULIN SER-MCNC: 3.2 GM/DL (ref 2.4–3.5)
GLUCOSE SERPL-MCNC: 82 MG/DL (ref 82–115)
HCT VFR BLD AUTO: 37.7 % (ref 37–47)
HGB BLD-MCNC: 11.8 GM/DL (ref 12–16)
IMM GRANULOCYTES # BLD AUTO: 0.02 X10(3)/MCL (ref 0–0.04)
IMM GRANULOCYTES NFR BLD AUTO: 0.3 %
LYMPHOCYTES # BLD AUTO: 2.12 X10(3)/MCL (ref 0.6–4.6)
LYMPHOCYTES NFR BLD AUTO: 33.7 %
MCH RBC QN AUTO: 28.6 PG (ref 27–31)
MCHC RBC AUTO-ENTMCNC: 31.3 MG/DL (ref 33–36)
MCV RBC AUTO: 91.3 FL (ref 80–94)
MONOCYTES # BLD AUTO: 0.38 X10(3)/MCL (ref 0.1–1.3)
MONOCYTES NFR BLD AUTO: 6 %
NEUTROPHILS # BLD AUTO: 3.6 X10(3)/MCL (ref 2.1–9.2)
NEUTROPHILS NFR BLD AUTO: 56.3 %
NRBC BLD AUTO-RTO: 0 %
PLATELET # BLD AUTO: 271 X10(3)/MCL (ref 130–400)
PMV BLD AUTO: 9.9 FL (ref 7.4–10.4)
POTASSIUM SERPL-SCNC: 3.7 MMOL/L (ref 3.5–5.1)
PROT SERPL-MCNC: 6.8 GM/DL (ref 5.8–7.6)
RBC # BLD AUTO: 4.13 X10(6)/MCL (ref 4.2–5.4)
SODIUM SERPL-SCNC: 143 MMOL/L (ref 136–145)
TROPONIN I SERPL-MCNC: <0.01 NG/ML (ref 0–0.04)
WBC # SPEC AUTO: 6.3 X10(3)/MCL (ref 4.5–11.5)

## 2022-11-11 PROCEDURE — 93005 ELECTROCARDIOGRAM TRACING: CPT

## 2022-11-11 PROCEDURE — 80053 COMPREHEN METABOLIC PANEL: CPT

## 2022-11-11 PROCEDURE — 82550 ASSAY OF CK (CPK): CPT

## 2022-11-11 PROCEDURE — 63600175 PHARM REV CODE 636 W HCPCS

## 2022-11-11 PROCEDURE — 84484 ASSAY OF TROPONIN QUANT: CPT

## 2022-11-11 PROCEDURE — 82248 BILIRUBIN DIRECT: CPT

## 2022-11-11 PROCEDURE — 99285 EMERGENCY DEPT VISIT HI MDM: CPT | Mod: 25

## 2022-11-11 PROCEDURE — 85025 COMPLETE CBC W/AUTO DIFF WBC: CPT

## 2022-11-11 RX ORDER — SODIUM CHLORIDE, SODIUM LACTATE, POTASSIUM CHLORIDE, CALCIUM CHLORIDE 600; 310; 30; 20 MG/100ML; MG/100ML; MG/100ML; MG/100ML
INJECTION, SOLUTION INTRAVENOUS CONTINUOUS
Status: DISCONTINUED | OUTPATIENT
Start: 2022-11-11 | End: 2022-11-11

## 2022-11-11 RX ORDER — DICYCLOMINE HYDROCHLORIDE 20 MG/1
20 TABLET ORAL 3 TIMES DAILY
Qty: 21 TABLET | Refills: 0 | Status: SHIPPED | OUTPATIENT
Start: 2022-11-11 | End: 2022-11-18

## 2022-11-11 NOTE — ED PROVIDER NOTES
Encounter Date: 2022       History     Chief Complaint   Patient presents with    Abdominal Pain     Reports scheduled to have stent removed 9 days ago. Stent placed to help drain bile from gallbladder. Stabbing pain in this area. Reports nausea. Denies any vomiting or fevers/chills.      Lore Stallings is a 61 yo F with PMH of hiatal hernia and GERD who had a common bile duct stent placed 2 months ago presents to the emergency department for epigastric pain described as a shooting pain that travels across the diaphragm x2 days associated with a cough productive of yellow-green sputum. Does have nausea but no vomiting. Pain is not made worse with food intake, deep breathing, or valsalva. She is also having difficulty swallowing due to a dry throat. She is awaiting a call from [facility] GI services for removal of stent and is quite frustrated about not receiving a call thus far. States she was supposed to have the stent removed on 2022. Denies fever, palpitations, chest pressure/pain, recent sick contacts, hematemesis, blood in stool, colicky abdominal pain.     The history is provided by the patient. No  was used.     Review of patient's allergies indicates:   Allergen Reactions    Amoxicillin-pot clavulanate Hives     Other reaction(s): Swelling    Iodine and iodide containing products      Other reaction(s): Burning sensation    Sulfamethoxazole-trimethoprim      Other reaction(s): Constipation, Dizziness, Loss of appetite, Rapid heart beat, Shaking     Past Medical History:   Diagnosis Date    Asthma     GERD (gastroesophageal reflux disease)     Hypertension     Lumbago     Personal history of colonic polyps 2021    Sciatica      Past Surgical History:   Procedure Laterality Date    APPENDECTOMY       SECTION      COLONOSCOPY      COLONOSCOPY W/ POLYPECTOMY  2021    ERCP W/ SPHICTEROTOMY N/A 2022    Procedure: ERCP, WITH SPHINCTEROTOMY;  Surgeon: Iris  KRYSTINA Sandy MD;  [facility] ENDOSCOPY;  Service: Gastroenterology;  Laterality: N/A;    ESOPHAGOGASTRECTOMY      HYSTERECTOMY      POLYPECTOMY      RHINOPLASTY      TONSILLECTOMY       Family History   Problem Relation Age of Onset    Asthma Father     Heart disease Brother     Arthritis Brother      Social History     Tobacco Use    Smoking status: Former     Packs/day: 1.00     Years: 53.00     Pack years: 53.00     Types: Cigarettes     Quit date: 2022     Years since quittin.3    Smokeless tobacco: Never   Substance Use Topics    Alcohol use: Not Currently    Drug use: Never     Review of Systems   Constitutional:  Positive for chills. Negative for appetite change and fever.   HENT:  Negative for congestion, sinus pressure, sinus pain and sore throat.    Respiratory:  Positive for cough, shortness of breath and wheezing.    Cardiovascular:  Negative for chest pain, palpitations and leg swelling.   Gastrointestinal:  Positive for abdominal pain and nausea. Negative for abdominal distention, blood in stool, constipation, diarrhea and vomiting.   Genitourinary:  Negative for dysuria, hematuria and urgency.   Musculoskeletal:  Negative for back pain.   Skin:  Negative for rash.   Neurological:  Negative for dizziness, weakness and light-headedness.   All other systems reviewed and are negative.    Physical Exam     Initial Vitals [22 1332]   BP Pulse Resp Temp SpO2   (!) 164/89 94 18 96.8 °F (36 °C) 96 %      MAP       --         Physical Exam    Nursing note and vitals reviewed.  Constitutional: She appears well-developed and well-nourished. No distress.   HENT:   Head: Normocephalic and atraumatic.   Mouth/Throat: Mucous membranes are dry. No oropharyngeal exudate.   Eyes: Conjunctivae and EOM are normal. Pupils are equal, round, and reactive to light.   Neck:   Normal range of motion.  Cardiovascular:  Regular rhythm and normal heart sounds.           No murmur heard.  Pulmonary/Chest: Breath  sounds normal.   Abdominal: Abdomen is soft. Bowel sounds are normal. She exhibits no distension. There is no abdominal tenderness.   Unable to reproduce abdominal pain on palpation.    Musculoskeletal:         General: Normal range of motion.      Cervical back: Normal range of motion.     Lymphadenopathy:     She has no cervical adenopathy.   Neurological: She is alert and oriented to person, place, and time. GCS score is 15. GCS eye subscore is 4. GCS verbal subscore is 5. GCS motor subscore is 6.   Skin: Skin is warm and dry. Capillary refill takes 2 to 3 seconds.   Psychiatric: She has a normal mood and affect.       ED Course   Procedures  Labs Reviewed   COMPREHENSIVE METABOLIC PANEL - Abnormal; Notable for the following components:       Result Value    Blood Urea Nitrogen 7.5 (*)     All other components within normal limits   CBC WITH DIFFERENTIAL - Abnormal; Notable for the following components:    RBC 4.13 (*)     Hgb 11.8 (*)     MCHC 31.3 (*)     All other components within normal limits   BILIRUBIN, DIRECT - Normal   TROPONIN I - Normal   CK - Normal   CBC W/ AUTO DIFFERENTIAL    Narrative:     The following orders were created for panel order CBC auto differential.  Procedure                               Abnormality         Status                     ---------                               -----------         ------                     CBC with Differential[[id number]]        Abnormal            Final result                 Please view results for these tests on the individual orders.   EXTRA TUBES    Narrative:     The following orders were created for panel order EXTRA TUBES.  Procedure                               Abnormality         Status                     ---------                               -----------         ------                     Light Blue Top Hold[[id number]]                              In process                 Pink Top Hold[[id number]]                                    In  process                   Please view results for these tests on the individual orders.   LIGHT BLUE TOP HOLD   PINK TOP HOLD     EKG Readings: (Independently Interpreted)   Initial Reading: No STEMI.     Imaging Results              X-Ray Chest PA And Lateral (Final result)  Result time 11/11/22 15:29:57      Final result by Syed Crandall MD (11/11/22 15:29:57)                   Impression:      No acute chest disease is identified.      Electronically signed by: Syed Crandall  Date:    11/11/2022  Time:    15:29               Narrative:    EXAMINATION:  XR CHEST PA AND LATERAL    CLINICAL HISTORY:  , Cough, unspecified.    FINDINGS:  No alveolar consolidation, effusion, or pneumothorax is seen.   The thoracic aorta is normal  cardiac silhouette, central pulmonary vessels and mediastinum are normal in size and are grossly unremarkable.   visualized osseous structures are grossly unremarkable.                                       Medications - No data to display               Attending Attestation:   Physician Attestation Statement for Resident:  As the supervising MD   Physician Attestation Statement: I have personally seen and examined this patient.   I agree with the above history.  -:   As the supervising MD I agree with the above PE.     As the supervising MD I agree with the above treatment, course, plan, and disposition.    I have reviewed and agree with the residents interpretation of the following: lab data.  I have reviewed the following: old records at this facility.            Attending ED Notes:   I performed a face to face evaluation of the patient Lore Stallings and my history reveal abdominal pain, Hx of biliary duct stent several months ago, the physical exam was unremarkable.  I reviewed the history, physical exam and diagnostic studies performed by the resident Dr. MADDY Bhatt and I concur with the diagnosis and assessment. This case was initially evaluated by Dr. Bhatt  under my  supervision and I agree with the documentation and plan.    Total teaching time: 12 min        ED Course as of 11/11/22 1745   Fri Nov 11, 2022   1440 Patient refused IVF and IV. [BB]      ED Course User Index  [BB] Mateo Bhatt MD                 Clinical Impression:   Final diagnoses:  [R05.9] Cough  [R10.13] Epigastric pain  [K29.70] Gastritis, presence of bleeding unspecified, unspecified chronicity, unspecified gastritis type (Primary)        ED Disposition Condition    Discharge Stable          ED Prescriptions       Medication Sig Dispense Start Date End Date Auth. Provider    dicyclomine (BENTYL) 20 mg tablet Take 1 tablet (20 mg total) by mouth 3 (three) times daily. for 7 days 21 tablet 11/11/2022 11/18/2022 Mateo Bhatt MD          Follow-up Information       Follow up With Specialties Details Why Contact Info    Kyra Reed MD Family Medicine Schedule an appointment as soon as possible for a visit   [address] [facility] [address]  [phone]      [facility] Emergency Medicine  If symptoms worsen [address] [facility] [id number] [phone]    Iris Sandy MD Gastroenterology   [address] [facility] [address]  [phone]               Tj Durham MD  11/11/22 1747

## 2022-11-11 NOTE — TELEPHONE ENCOUNTER
----- Message from Phyllis Díaz sent at 11/11/2022 12:43 PM CST -----  Regarding: RE: Surgery Referral  Thanks!  ----- Message -----  From: Steve Perez LPN  Sent: 11/11/2022  11:51 AM CST  To: Phyllis Díaz  Subject: RE: Surgery Referral                             New referral placed for patient. Dr. Naqvi last note mentioned the patient can contact office ([phone]) for hiatal hernia repair. Thank you.  ----- Message -----  From: Phyllis Díaz  Sent: 11/11/2022  11:01 AM CST  To: Steve Perez LPN  Subject: Surgery Referral                                 Pt was referred to [facility] for hiatal hernia, referral was rejected. Pt recently acquired medicaid and would like referral re-sent. Thanks!

## 2022-11-11 NOTE — TELEPHONE ENCOUNTER
"Returned patient's telephone call regarding "chest pain".  After further conversation, pt is having epigastric to left upper abdominal pain and nausea without vomiting. Her last meal was 2 days ago.  She denies any fever or diarrhea.  She states that she was suppose to have a stent replaced by Dr. Vazquez in the first week of November but "has not been able to get it removed" and feels that this is the source of her symptoms.  She was instructed to go to the ED. Pt has agreed.  In addition,  there is an error in her chart regarding an old abdominal mass from 2019 that appears as if it is current but this has been resolved.     Kyra Swartz MD, PGY-2  [facility] Family Medicine     "

## 2022-11-15 ENCOUNTER — TELEPHONE (OUTPATIENT)
Dept: GASTROENTEROLOGY | Facility: CLINIC | Age: 62
End: 2022-11-15
Payer: MEDICAID

## 2022-11-25 ENCOUNTER — TELEPHONE (OUTPATIENT)
Dept: FAMILY MEDICINE | Facility: CLINIC | Age: 62
End: 2022-11-25
Payer: MEDICAID

## 2022-11-25 NOTE — TELEPHONE ENCOUNTER
Pharmacy faxed request for refill on Cyclobenzaprine 10 mg, qty#90. Med not on current list in Epic, but is listed in [employer]. Please send new Rx or advise if Rx has been d/c. Thank you!

## 2022-12-12 ENCOUNTER — OFFICE VISIT (OUTPATIENT)
Dept: FAMILY MEDICINE | Facility: CLINIC | Age: 62
End: 2022-12-12
Payer: MEDICAID

## 2022-12-12 VITALS
TEMPERATURE: 98 F | RESPIRATION RATE: 20 BRPM | BODY MASS INDEX: 18.99 KG/M2 | WEIGHT: 121 LBS | HEIGHT: 67 IN | SYSTOLIC BLOOD PRESSURE: 134 MMHG | DIASTOLIC BLOOD PRESSURE: 80 MMHG | OXYGEN SATURATION: 98 % | HEART RATE: 100 BPM

## 2022-12-12 DIAGNOSIS — J06.9 UPPER RESPIRATORY TRACT INFECTION, UNSPECIFIED TYPE: ICD-10-CM

## 2022-12-12 DIAGNOSIS — M79.10 MUSCLE PAIN: Primary | ICD-10-CM

## 2022-12-12 DIAGNOSIS — M79.642 LEFT HAND PAIN: ICD-10-CM

## 2022-12-12 DIAGNOSIS — J45.998 UNCONTROLLED PERSISTENT ASTHMA: ICD-10-CM

## 2022-12-12 LAB
FLUAV AG UPPER RESP QL IA.RAPID: NOT DETECTED
FLUBV AG UPPER RESP QL IA.RAPID: NOT DETECTED
RSV A 5' UTR RNA NPH QL NAA+PROBE: NOT DETECTED
SARS-COV-2 RNA RESP QL NAA+PROBE: NOT DETECTED

## 2022-12-12 PROCEDURE — 0241U COVID/RSV/FLU A&B PCR: CPT

## 2022-12-12 PROCEDURE — 99214 OFFICE O/P EST MOD 30 MIN: CPT | Mod: PBBFAC | Performed by: NURSE PRACTITIONER

## 2022-12-12 RX ORDER — FLUTICASONE PROPIONATE 50 MCG
2 SPRAY, SUSPENSION (ML) NASAL DAILY
Qty: 16 G | Refills: 0 | Status: SHIPPED | OUTPATIENT
Start: 2022-12-12 | End: 2022-12-26

## 2022-12-12 RX ORDER — MONTELUKAST SODIUM 10 MG/1
10 TABLET ORAL NIGHTLY
Qty: 30 TABLET | Refills: 14 | Status: CANCELLED | OUTPATIENT
Start: 2022-12-12 | End: 2022-12-26

## 2022-12-12 RX ORDER — CETIRIZINE HYDROCHLORIDE 10 MG/1
10 TABLET ORAL NIGHTLY
Qty: 14 TABLET | Refills: 0 | Status: SHIPPED | OUTPATIENT
Start: 2022-12-12 | End: 2023-03-07

## 2022-12-12 RX ORDER — PREDNISONE 20 MG/1
40 TABLET ORAL DAILY
Qty: 10 TABLET | Refills: 0 | Status: SHIPPED | OUTPATIENT
Start: 2022-12-12 | End: 2022-12-17

## 2022-12-12 RX ORDER — CYCLOBENZAPRINE HCL 10 MG
10 TABLET ORAL 3 TIMES DAILY PRN
Qty: 31 TABLET | Refills: 11 | Status: SHIPPED | OUTPATIENT
Start: 2022-12-12 | End: 2023-01-12

## 2022-12-12 RX ORDER — CYCLOBENZAPRINE HCL 10 MG
10 TABLET ORAL 3 TIMES DAILY PRN
COMMUNITY
End: 2022-12-12 | Stop reason: SDUPTHER

## 2022-12-12 NOTE — PROGRESS NOTES
"  Family Medicine Clinic Note:    PCP: Kyra Reed MD    Lore Stallings is a 62 y.o. female with a history of asthma, hiatal hernia, rectal mass and GERD who had a common bile duct stent placement presents to clinic s/p recent ED visit on 11/11/22 for c/o abd pain. [facility] GI stent removal is still pending. Was to be done on 11/2/22. She presents to the clinic for c/o cough and management of her chronic disease.      Subjective:   Acute:  Uncontrolled Asthma  Onset: 4 months ago  +productive green sputum  -uses the rescue inhaler BID and wakening during the night every other evening  -Using the Dulera BID   -Denies nausea, vomiting, rhinorrhea,  fevers or sore throats  -No sick contacts  +Diarrhea as "many" she attributes to amlodipine and has stopped taking this medication  +Using the mucinex  -Requesting nebulizer machine      Chronic:  Stent of CBD 2/2 hyperbilirubinemia  -Pending stent removal from CBD from GI   -Has not been contacted    Hiatal Hernia  -Continues to have intermittent abd pain  -Has not been contacted by Dr. Weaver's office     Contusion of right knee   Hairline fracture at the anterior tibia  -Wearing a knee brace    -No difficulty bearing weight   -Denies any pain or paresthesia    -Refill requested for flexeril    ROS      Constitutional: No fevers, chills   ENMT: see HPI  Respiratory: see HPI  Cardiovascular: no chest pain or tightness,   Gastrointestinal: see HPI  Musculoskeletal: see HPI    Current Outpatient Medications   Medication Sig Dispense Refill    alendronate (FOSAMAX) 10 MG Tab Take 10 mg by mouth once daily.      amLODIPine (NORVASC) 10 MG tablet Take 1 tablet (10 mg total) by mouth once daily. 90 tablet 0    EScitalopram oxalate (LEXAPRO) 10 MG tablet Take 1 tablet (10 mg total) by mouth once daily. 90 tablet 0    hydroCHLOROthiazide (HYDRODIURIL) 12.5 MG Tab Take 12.5 mg by mouth once daily.      omeprazole (PRILOSEC) 20 MG capsule Take 1 capsule (20 mg total) by " mouth once daily. 30 capsule 11    ondansetron (ZOFRAN-ODT) 4 MG TbDL Take 2 tablets (8 mg total) by mouth nightly as needed (nausea). 30 tablet 2    pregabalin (LYRICA) 75 MG capsule Take 1 capsule (75 mg total) by mouth nightly as needed (sciatica). 30 capsule 2    PROAIR HFA 90 mcg/actuation inhaler Inhale 2 puffs into the lungs every 4 (four) hours as needed. 18 g 11     No current facility-administered medications for this visit.       Objective:     There were no vitals taken for this visit.  BP Readings from Last 3 Encounters:   11/11/22 (!) 164/89   11/10/22 (!) 147/72   10/18/22 136/77     Wt Readings from Last 3 Encounters:   11/11/22 55.7 kg (122 lb 14.5 oz)   11/10/22 55.8 kg (123 lb)   10/18/22 54.4 kg (119 lb 14.9 oz)     No results found for this or any previous visit (from the past 200 hour(s)).  There is no height or weight on file to calculate BMI.    Physical Exam  Constitutional: NAD  Head: Normocephalic, atraumatic  ENT: No lymphadenopathy, No thyromegaly, nasal turbinates boggy.No maxillary or ethmoid tenderness. Posterior oral pharynx with cobblestoning, no exudate noted  Lungs: scattered wheezing throughout posterior lung fields  Cardiovascular: Normal heart sounds, No MRG  Abdomen: Soft, Nontender, No palpable hepatosplenomegaly, No abdominal masses,  MSK: Tenderness and mild erythema  to left hypothenar eminence and left 5th digit. Strength 5/5 to all digits. No limited ROM noted to any digits bilaterally. Capillary refill <3 seconds bilaterally. No difficulty with extension or flexion of any digits.           Narrative & Impression  EXAMINATION:  MRI KNEE WITHOUT CONTRAST RIGHT     CLINICAL HISTORY:  62-year-old woman with right knee pain post fall injury approximately 1 month ago.     TECHNIQUE:  Axial T2 fat sat, sagittal T2 fat sat, sagittal PD, coronal PD fat sat, coronal T1, and thin slice axial T2 fat-sat meniscal non-contrast 1.5 T magnetic resonance imaging was performed through  the right knee per routine protocol.     COMPARISON:  None.  Correlation is made with right knee radiographs of 08/24/2022.     FINDINGS:  There is no a joint effusion or popliteal fossa cyst.  There is prominent focal marrow edema at the inferomedial patella and additional prominent marrow edema at the anterior tibial plateau and extending caudally to the level of the anterior tibial tuberosity.  There is a subtle hairline fracture extending longitudinal E at the anterior proximal tibia on sagittal PD image 18.  No additional fracture and alignment at the knee is anatomic.  There is no hematoma or bursitis.  There is severe full-thickness and near full-thickness cartilage loss along the superior and central lateral patellar facet and mid sagittal ridge with no abnormality of the trochlear articular cartilage.  There is mild patellofemoral joint space narrowing.  The femorotibial articular cartilage is normal and there is no subchondral degenerative marrow change or osteophyte formation.  The ACL and PCL are intact and normal.  The medial meniscus and lateral meniscus are both intact and normal.  The medial and lateral ligaments and tendons are all normal in course, morphology, and signal characteristics.  The quadriceps tendon and patellofemoral retinacular complexes are normal and there is no abnormality at the quadriceps fat pad.  There is mild proximal half patellar tendinosis and no pathology at Hoffa's fat pad.  There is no muscle edema or atrophy.  The proximal tibiofibular joint is normal.  No osseous lesion.     Impression:     Prominent focal osseous contusion at the inferomedial patella and along the anterior proximal tibia with subtle nondisplaced hairline fracture at the anterior tibia.     Severe attenuation of the articular cartilage along the lateral patellar facet and adjacent mid sagittal ridge with no additional degenerative arthrosis at the right knee.     Mild patellar tendinosis.         Electronically signed by: Avni Brewer  Date:                                            10/25/2022    Assessment:   Lore Stallings is a 62 y.o. female with:    No diagnosis found.  AR  Uncontrolled Asthma  Hiatal hernia   Contusion of right knee   Hairline fracture of anterior tibia   Hiatal Hernia  Right hand 5th digit pain 2/2 FOOSH  Plan:   AR  Ordered Zyrtec 10 mg qhs  Ordered Flonase   Ordered COVID/RSV/Flu PCR. No work until pt has negative result for COVID    Uncontrolled Asthma  -Ordered Prednisone 40 mg daily x 5 days  -Increased the dulera to 200 mcg/5 mg BID  -Declined Singulair at this time  -DuoNeb every six hours prn with nebulizer machine    Contusion of right knee   Hairline fracture of anterior tibia   -MRI with subtle nondisplaced hairline fracture at the anterior tibia (see report)  -Wearing supportive device on the knee.  Would benefit from additional evaluation from orthopedic  -Declines orthopedic services at this time. Discussed benefits and risks.     Hiatal Hernia  -A new referral to Dr. Weaver at [facility]'s office since pt has new insurance (medicaid) was placed last visit  -Spoke to surgery dept who was to resend the referral.  Will investigate as to why the patient has not been contacted    Right hand 5th digit pain 2/2 FOOSH  -Ordered x-ray of left hand to rule out fractures    -Refilled flexeril    RTC 1 month            Future Appointments   Date Time Provider Department Center   12/12/2022  1:40 PM RESIDENT 13, [facility] FAMILY MEDICINE [facility] FM RES [place]   3/27/2023  1:10 PM ABDI [place] [facility] GYN [place]   10/10/2023  1:40 PM NURSE PRACTITIONER, OCC [facility] OCC [facility] Carlos Naqvi       Staff: Dr. Blake Swartz MD  Family Medicine PGY-2

## 2022-12-13 ENCOUNTER — HOSPITAL ENCOUNTER (OUTPATIENT)
Dept: RADIOLOGY | Facility: HOSPITAL | Age: 62
Discharge: HOME | End: 2022-12-13
Attending: NURSE PRACTITIONER
Payer: MEDICAID

## 2022-12-13 DIAGNOSIS — M79.642 LEFT HAND PAIN: ICD-10-CM

## 2022-12-13 PROCEDURE — 73130 X-RAY EXAM OF HAND: CPT | Mod: TC,LT

## 2022-12-13 RX ORDER — IPRATROPIUM BROMIDE AND ALBUTEROL SULFATE 2.5; .5 MG/3ML; MG/3ML
3 SOLUTION RESPIRATORY (INHALATION) EVERY 6 HOURS PRN
Qty: 120 ML | Refills: 0 | Status: SHIPPED | OUTPATIENT
Start: 2022-12-13 | End: 2022-12-19 | Stop reason: SDUPTHER

## 2022-12-19 DIAGNOSIS — J45.998 UNCONTROLLED PERSISTENT ASTHMA: ICD-10-CM

## 2022-12-19 RX ORDER — IPRATROPIUM BROMIDE AND ALBUTEROL SULFATE 2.5; .5 MG/3ML; MG/3ML
3 SOLUTION RESPIRATORY (INHALATION) EVERY 6 HOURS PRN
Qty: 120 ML | Refills: 0 | Status: SHIPPED | OUTPATIENT
Start: 2022-12-19 | End: 2023-02-09

## 2023-01-10 ENCOUNTER — OFFICE VISIT (OUTPATIENT)
Dept: FAMILY MEDICINE | Facility: CLINIC | Age: 63
End: 2023-01-10
Payer: MEDICAID

## 2023-01-10 VITALS
RESPIRATION RATE: 20 BRPM | TEMPERATURE: 98 F | WEIGHT: 118.38 LBS | DIASTOLIC BLOOD PRESSURE: 71 MMHG | HEIGHT: 67 IN | BODY MASS INDEX: 18.58 KG/M2 | SYSTOLIC BLOOD PRESSURE: 122 MMHG | OXYGEN SATURATION: 96 % | HEART RATE: 102 BPM

## 2023-01-10 DIAGNOSIS — K46.9 ABDOMINAL HERNIA WITHOUT OBSTRUCTION AND WITHOUT GANGRENE, RECURRENCE NOT SPECIFIED, UNSPECIFIED HERNIA TYPE: Primary | ICD-10-CM

## 2023-01-10 PROCEDURE — 99214 OFFICE O/P EST MOD 30 MIN: CPT | Mod: PBBFAC | Performed by: NURSE PRACTITIONER

## 2023-01-10 NOTE — PROGRESS NOTES
Family Medicine Clinic Note:    PCP: Kyra Reed MD    Lore Stallings is a 62 y.o. female presents to clinic today for f/up regarding her abdominal pain      Subjective:   Abdominal Hernia  -Pt states that she continues to have abdominal to the epigastric and RUQ areas  -No radiating pain, nausea, vomiting   -She continues to use the Prilosec daily   -She believes the stent is still in place and needs to be removed  -Stent was removed 11/30/22  -Was referred to Dr. Weaver's office but he does not accept medicaid  -Pt will not move forward on management of her rectal mass until the hernia is repaired due this being the source of her pain despite discussion of risks.       ROS  Constitutional: No fevers, chills or fatigue  Respiratory: no trouble breathing or SOB  Cardiovascular: no chest pain or tightness, no SOB on activity, no ankle swelling  Gastrointestinal: no constipation, no diarrhea, no nausea, no vomiting see HPI     Current Outpatient Medications   Medication Sig Dispense Refill    albuterol-ipratropium (DUO-NEB) 2.5 mg-0.5 mg/3 mL nebulizer solution Take 3 mLs by nebulization every 6 (six) hours as needed for Wheezing. Rescue 120 mL 0    alendronate (FOSAMAX) 10 MG Tab Take 10 mg by mouth once daily.      cetirizine (ZYRTEC) 10 MG tablet Take 1 tablet (10 mg total) by mouth every evening. for 14 days 14 tablet 0    cyclobenzaprine (FLEXERIL) 10 MG tablet Take 1 tablet (10 mg total) by mouth 3 (three) times daily as needed. 31 tablet 11    hydroCHLOROthiazide (HYDRODIURIL) 12.5 MG Tab Take 12.5 mg by mouth once daily.      mometasone-formoterol (DULERA) 100-5 mcg/actuation HFAA Inhale 200 mcg into the lungs 2 (two) times daily as needed. Controller      omeprazole (PRILOSEC) 20 MG capsule Take 1 capsule (20 mg total) by mouth once daily. 30 capsule 11    PROAIR HFA 90 mcg/actuation inhaler Inhale 2 puffs into the lungs every 4 (four) hours as needed. 18 g 11     No current  "facility-administered medications for this visit.       Objective:     /71 (BP Location: Left arm, Patient Position: Sitting, BP Method: Medium (Automatic))   Pulse 102   Temp 98.4 °F (36.9 °C) (Oral)   Resp 20   Ht 5' 7" (1.702 m)   Wt 53.7 kg (118 lb 6.2 oz)   SpO2 96%   BMI 18.54 kg/m²   BP Readings from Last 3 Encounters:   01/10/23 122/71   12/12/22 134/80   11/11/22 (!) 164/89     Wt Readings from Last 3 Encounters:   01/10/23 53.7 kg (118 lb 6.2 oz)   12/12/22 54.9 kg (121 lb)   11/11/22 55.7 kg (122 lb 14.5 oz)     No results found for this or any previous visit (from the past 200 hour(s)).  Body mass index is 18.54 kg/m².    Physical Exam  Constitutional: NAD  Lungs: CTAB, No crackles, No wheezes  Cardiovascular: Normal heart sounds, No MRG  Abdomen: Soft, Tender to TUQ and epigastric area, No palpable hepatosplenomegaly, No abdominal masses  Extremities: No cyanosis, No clubbing, No edema    11/30/2022   Procedure: ERCP with Spinchterotomy and balloon sweep with stone and sludge removal.    Endoscopist: Souleymane Bishop MD    Indications: Retained large common bile duct stone status post recent ERCP with partial stone removal and plastic stent placement at outside facility    Consent: Patient was explained the risk/benifits/alternatives of the procedure in details. Patient was given the opportunity to ask questions and then an informed signed consent was obtained. Risks of pancreatitis (5-10%) were discussed including the fact that it can can lead to prolonged hospitalization and even death. Risks of bleeding and perforation were also discussed.     Anesthesia: Provided by Anesthesia staff. Please refer to their note for details.    Description of procedure:     After informed consent patient was brought to the Endoscopy suite. General Anesthesia provided by anesthesia staff. Patient was positioned in prone position. A duodenoscope was introduced through the posterior Oropharynx into esophagus, " stomach and duodenum without difficulty. Ampulla was identified. Previously placed plastic stent was extending from the biliary orifice and partially occluded. Plastic stent removal was performed with a snare in standard fashion. Wire-guided biliary cannulation using a sphincterotome and a 0.025 inch guidewire was successful and deep wire access obtained under fluoroscopic guidance. Cholangiogram was obtained. Using ERBE, a 10 mm spinchterotomy was performed without any post sphincterotomy contrast leak or significant bleeding.     Thereafter the using the rapid exchange system, a 12-15 mm balloon was introduced into biliary tree and multiple sweeps performed. Multiple large stones and copious sludge were removed. Post balloon sweeps, occlusion cholangiogram was obtained which did not show any filling defects.    Air was evacuated from the stomach and the scope was withdrawn and procedure was completed. The patient tolerated the procedure well and was able to be transferred to the recovery area in stable condition.    Findings:    Ampulla: Partially occluded biliary stent status post removal with a snare    Bile Duct:  - Stenosis of small previous biliary sphincterotomy  - Dilated common bile duct up to 13 mm with multiple filling defects consistent with retained stones  - Extension biliary sphincterotomy measuring approximately 10 mm performed with sphincterotome  - A 12-15 mm biliary extraction balloon was used for complete stone extraction. At conclusion of the procedure, a 15 mm extraction balloon would traverse the entire common bile duct and sphincterotomy site  - Occlusion cholangiogram was normal completion of procedure; no evidence of retained stones    Pancreatic Duct: PD was neither injected nor cannulated during the procedure.     Esophagus/Stomach/Duodenum: Poor visualization due to Side viewing scope.     Biopsies/Specimen: N/A    Stents: N/A    Estimated blood loss: <5 cc    Complications:  None    Medications:   Indomethacin 100mg Suppository per rectum pre-operatively    Diagnostic Impression:  - Partially occluded plastic biliary stent status post removal with snare  - Small stenosed previous biliary sphincterotomy  - 10 mm extension biliary sphincterotomy performed in standard fashion  - Dilated common bile duct to 13 mm with numerous retained stones and sludge status post balloon sweep with complete removal    Recommendations:   - Discharge patient to home  - Resume current medications  - Clear liquid diet today; advance to previous tomorrow  - Findings discussed with patient & family  - Follow-up in clinic with Dr. Iris Sandy, referring physician    Disposition: To PACU in stable condition.    Souleymane Bishop MD    Components of this note were documented     The 10-year ASCVD risk score (Maryam DK, et al., 2019) is: 7.3%    Values used to calculate the score:      Age: 62 years      Sex: Female      Is Non- : No      Diabetic: No      Tobacco smoker: No      Systolic Blood Pressure: 122 mmHg      Is BP treated: Yes      HDL Cholesterol: 47 mg/dL      Total Cholesterol: 240 mg/dL      Assessment:   Lore Stallings is a 62 y.o. female with:    Abdominal Hernia    Plan:     Abdominal Hernia  -Spoke to Dr. Vazquez who believes that the hernia is not the source of her pain due it being 2 cm.  She has a rectal poly that has to be removed that must be removed to avoid further growth.  Her office to set up appointment for further discussion.   -Dr. Bishop removed the stent on 11/30/22  -Need another surgeon that accepts medicaid.    -Reached to surgery at [facility], Dr. Lopez (surgical resident) to see if perhaps another referral from his office can be made, pending  -Also, spoke to Nurse Sarah Cote LPN to also try to find a surgeon      Future Appointments   Date Time Provider Department Center   3/27/2023  1:10 PM [facility] GYN [facility]   10/10/2023   1:40 PM NURSE PRACTITIONER, OCC [facility] OCC [facility] Carlos Naqvi       Staff: Dr. Obi Swartz MD  Family Medicine -3

## 2023-01-11 PROBLEM — K46.9 ABDOMINAL HERNIA WITHOUT OBSTRUCTION AND WITHOUT GANGRENE: Status: ACTIVE | Noted: 2023-01-11

## 2023-01-17 PROBLEM — D12.8 ADENOMATOUS RECTAL POLYP: Status: ACTIVE | Noted: 2023-01-17

## 2023-01-17 PROBLEM — R74.01 TRANSAMINITIS: Status: RESOLVED | Noted: 2022-06-28 | Resolved: 2023-01-17

## 2023-01-18 NOTE — PROGRESS NOTES
Faculty Attestation: Lore Stallings  was seen in Family Medicine Clinic. Discussed with resident at the time of the visit. History of Present Illness, Physical Exam, and Assessment and Plan reviewed. Treatment plan is reasonable and appropriate. Compliance with treatment recommendations is important.  No imaging studies were done today.  No procedure was performed.     Nathen Crocker MD

## 2023-01-22 DIAGNOSIS — K44.9 HIATAL HERNIA: Primary | ICD-10-CM

## 2023-01-31 ENCOUNTER — OFFICE VISIT (OUTPATIENT)
Dept: SURGERY | Facility: CLINIC | Age: 63
End: 2023-01-31
Payer: MEDICAID

## 2023-01-31 VITALS
DIASTOLIC BLOOD PRESSURE: 74 MMHG | WEIGHT: 118 LBS | SYSTOLIC BLOOD PRESSURE: 147 MMHG | HEIGHT: 67 IN | BODY MASS INDEX: 18.52 KG/M2 | RESPIRATION RATE: 19 BRPM | HEART RATE: 90 BPM | OXYGEN SATURATION: 97 % | TEMPERATURE: 96 F

## 2023-01-31 DIAGNOSIS — K44.9 HIATAL HERNIA: Primary | ICD-10-CM

## 2023-01-31 PROCEDURE — 99215 OFFICE O/P EST HI 40 MIN: CPT | Mod: PBBFAC

## 2023-01-31 RX ORDER — CYCLOBENZAPRINE HCL 10 MG
10 TABLET ORAL 3 TIMES DAILY PRN
COMMUNITY
End: 2023-05-16 | Stop reason: SDUPTHER

## 2023-01-31 NOTE — PROGRESS NOTES
Placed in room. Seen by Dr. Syed and Trever Self,  and med student,  Jose Antonio. Spoke with patient. Will refer to [facility] for surgery.

## 2023-01-31 NOTE — PROGRESS NOTES
I have seen and evaluated the patient and reviewed the notes and assessments performed by Dr. Perera, I concur with her/his documentation of Lore Stallings.     Complex patient and history.   Symptomatic hiatal hernia which is the patients chief complaint.   Complicated by prior choledocholithiasis with an atrophied gallbladder where surgery was previously deferred.   Patient also with rectal mass, HGD on last biopsy.     She is very addamant that she does not wish to discuss the rectal mass, but I informed the patient that this finding was the most important to evaluate and treat in a timely manner due to the possibility of cancer and improved outcomes with early recognition and treatment. Patient understands this and wishes to follow up with Dr. Sandy for repeat evaluation. I discussed with her that I would like to discuss her findings with our colorectal surgeon or would recommend colorectal referral for further workup and evaluation, for which she again states she would only like to see Dr. Sandy for now.     As for her hiatal hernia and gallbladder, I agree that her gallbladder is quite contracted on imaging. I discussed referral for evaluation for hiatal hernia repair where intra-operative of her gallbladder could also be considered.    All questions answered and worrisome signs and symptoms for which to monitor were discussed.

## 2023-01-31 NOTE — PROGRESS NOTES
[facility] General Surgery Clinic Note    HPI: Ms. Stallings is a 62 y.o. female with a PMH of GERD, Hiatal hernia, and rectal mass (biopsy showed high grade dysplasia - patient refuses any surgical intervention of this mass). She presented to the hospital on 9/12/22 with acute onset left upper quadrant abdominal pain. Pt previously seen last September by our service for evaluation of hiatal hernia. Pt denies having dysphagia or odynophagia. She denies PO interolernace with either solids or liquids. She states that she has intermittent LUQ and RUQ abdominal pain worsened with lying down or bending over. She has hx of pneumobilia and choledocholithiasis for which she has undergone ERCP with stent and then interval stent removal. She has previous esophogram that displays type 1 sliding hiatal hernia. Pt is adamant about fixing hiatal hernia and removing gallbladder but becomes visibly upset when discuss rectal polyp.    PMH:   Past Medical History:   Diagnosis Date    Asthma     GERD (gastroesophageal reflux disease)     Hypertension     Lumbago     Personal history of colonic polyps 11/03/2021    Sciatica       Meds:   Current Outpatient Medications:     alendronate (FOSAMAX) 10 MG Tab, Take 10 mg by mouth once daily., Disp: , Rfl:     cyclobenzaprine (FLEXERIL) 10 MG tablet, Take 10 mg by mouth 3 (three) times daily as needed., Disp: , Rfl:     mometasone-formoterol (DULERA) 100-5 mcg/actuation HFAA, Inhale 200 mcg into the lungs 2 (two) times daily as needed. Controller, Disp: , Rfl:     omeprazole (PRILOSEC) 20 MG capsule, Take 1 capsule (20 mg total) by mouth once daily., Disp: 30 capsule, Rfl: 11    PROAIR HFA 90 mcg/actuation inhaler, Inhale 2 puffs into the lungs every 4 (four) hours as needed., Disp: 18 g, Rfl: 11    albuterol-ipratropium (DUO-NEB) 2.5 mg-0.5 mg/3 mL nebulizer solution, Take 3 mLs by nebulization every 6 (six) hours as needed for Wheezing. Rescue, Disp: 120 mL, Rfl: 0    cetirizine (ZYRTEC) 10 MG  tablet, Take 1 tablet (10 mg total) by mouth every evening. for 14 days, Disp: 14 tablet, Rfl: 0    hydroCHLOROthiazide (HYDRODIURIL) 12.5 MG Tab, Take 12.5 mg by mouth once daily., Disp: , Rfl:   Allergies:   Review of patient's allergies indicates:   Allergen Reactions    Amoxicillin-pot clavulanate Hives     Other reaction(s): Swelling    Iodine and iodide containing products      Other reaction(s): Burning sensation    Sulfamethoxazole-trimethoprim      Other reaction(s): Constipation, Dizziness, Loss of appetite, Rapid heart beat, Shaking     Social History:   Social History     Tobacco Use    Smoking status: Former     Packs/day: 1.00     Years: 53.00     Pack years: 53.00     Types: Cigarettes     Quit date: 2022     Years since quittin.5     Passive exposure: Never    Smokeless tobacco: Never   Substance Use Topics    Alcohol use: Not Currently    Drug use: Never     Family History:   Family History   Problem Relation Age of Onset    Asthma Father     Heart disease Brother     Arthritis Brother      Surgical History:   Past Surgical History:   Procedure Laterality Date    APPENDECTOMY       SECTION      COLONOSCOPY      COLONOSCOPY W/ POLYPECTOMY  2021    ERCP W/ SPHICTEROTOMY N/A 2022    Procedure: ERCP, WITH SPHINCTEROTOMY;  Surgeon: Iris Sandy MD;  Location[facility] ENDOSCOPY;  Service: Gastroenterology;  Laterality: N/A;    ESOPHAGOGASTRECTOMY      HYSTERECTOMY      POLYPECTOMY      RHINOPLASTY      TONSILLECTOMY       Review of Systems:  10 point review of systems denied unless otherwise indicated above HPI    Objective:    Vitals:  Vitals:    23 1200   BP: (!) 147/74   Pulse: 90   Resp: 19   Temp: 96.4 °F (35.8 °C)          Physical Exam:  Gen: NAD  Neuro: awake, alert, answering questions appropriately  CV: RRR  Resp: non-labored breathing  Abd: soft, ND, TTP in LUQ, previous exlap incision c/d/i  Ext: moves all 4 spontaneously and purposefully  Skin: warm,  well perfused    Assessment/Plan:  Ms. Stallings is a 62 y.o. female with a PMH of GERD, Hiatal hernia, and rectal mass (biopsy showed high grade dysplasia - patient refuses any surgical intervention of this mass).     - Placed ambulatory referral to [facility] for hiatal hernia repair and gallbladder evaluation  - Attempted consultation concerning rectal mass but was not cooperative with discussion.  - RTC PRN    Isra Perera MD  PGY3  01/31/2023 1:08 PM

## 2023-02-09 ENCOUNTER — OFFICE VISIT (OUTPATIENT)
Dept: FAMILY MEDICINE | Facility: CLINIC | Age: 63
End: 2023-02-09
Payer: MEDICAID

## 2023-02-09 VITALS
OXYGEN SATURATION: 99 % | HEART RATE: 105 BPM | BODY MASS INDEX: 19.03 KG/M2 | HEIGHT: 67 IN | WEIGHT: 121.25 LBS | TEMPERATURE: 98 F | DIASTOLIC BLOOD PRESSURE: 79 MMHG | SYSTOLIC BLOOD PRESSURE: 130 MMHG

## 2023-02-09 DIAGNOSIS — J45.998 UNCONTROLLED PERSISTENT ASTHMA: ICD-10-CM

## 2023-02-09 PROBLEM — R11.0 NAUSEA: Status: RESOLVED | Noted: 2022-07-29 | Resolved: 2023-02-09

## 2023-02-09 PROCEDURE — 99214 OFFICE O/P EST MOD 30 MIN: CPT | Mod: PBBFAC | Performed by: NURSE PRACTITIONER

## 2023-02-09 RX ORDER — IPRATROPIUM BROMIDE AND ALBUTEROL SULFATE 2.5; .5 MG/3ML; MG/3ML
3 SOLUTION RESPIRATORY (INHALATION) EVERY 6 HOURS PRN
Qty: 75 ML | Refills: 6 | Status: SHIPPED | OUTPATIENT
Start: 2023-02-09 | End: 2023-03-07 | Stop reason: SDUPTHER

## 2023-02-09 NOTE — PROGRESS NOTES
"  Family Medicine Clinic Note:    PCP: Kyra Reed MD    Lore Stallings is a 62 y.o. female with a past medical history type 1 sliding hiatal hernia, rectal mass (high-grade dysplasia) for which patient declines surgical intervention at this time), osteoporosis (on alendronate), GERD, asthma, HTN, multi-level lumbar degenerative disc disease, spondylosis, sciatica,  last hospitalization was 09/12/2022 for left upper quadrant and right upper quadrant abdominal pain secondary to pneumobilia in choledocholithiasis (for which she received ERCP and stent placement and removal by Dr. Vazquez) who presents to the clinic today for follow-up regarding abdominal pain    Subjective:   Sliding Hiatal Hernia   LUQ and RUQ pain 2/2 to suspected Gall Bladder as etiology  -Saw surgery on 1/31/23 and  they placed an ambulatory referral to [facility] for hiatal hernia repair and gallbladder evaluation at [facility] (unfortunately they do not accept Medicaid)  -Desires to do the cholecystectomy here at [facility]  -She does not want to go to [facility] or [place] for her surgery.  She wants it done only in [place] regardless of  possible worsening of her condition without surgical intervention.  -She reports that she left her appointment with Dr. Sandy because the wait was too long and that she is not going back    Rectal mass  -She is adamant that she does not want any surgical intervention and does not want to discuss this topic  -Risk of possible metastasis or death has been discussed and patient's responses was "Oh well. I am done with this"  -She becomes visibly upset when this topic is mentioned    Asthma  Requesting nebulizer machine and duoneb      Paresthesia to left quadricept   Left posterior quadricept with paresthesia that occurs intermittently   Onset unknown    HM:  -History of colonic polyps (Colonoscopy with polypectomy 11/3/2021)  -Mammogram 04/13/2021  -Hepatitis-C screen 09/12/2022  -Bone " density scan 04/13/2021 (positive osteoporosis)  -Smoker 1 pack per day for 53 years  -Needs shingles vaccine-declines  -Needs HIV screen  -Needs tetanus vaccine   -Low-density CT screen done 06/15/2023 (No detrimental interval change from prior exam.  Right upper lobe pulmonary nodule is unchanged- 7 mm. Between 6-8 mm will need to repeat the scan at 6-12 months.  -lipid panel due 08/24/2026  -6/15/22 pt unable to perform PFT due to excessive coughing and the test was discontinued  10/31/22 EGD    Care Team:  [facility] Surgery  [facility] GI Dr. George IGLESIAS  As per HPI  Current Outpatient Medications   Medication Sig Dispense Refill    albuterol-ipratropium (DUO-NEB) 2.5 mg-0.5 mg/3 mL nebulizer solution Take 3 mLs by nebulization every 6 (six) hours as needed for Wheezing. Rescue 120 mL 0    alendronate (FOSAMAX) 10 MG Tab Take 10 mg by mouth once daily.      cetirizine (ZYRTEC) 10 MG tablet Take 1 tablet (10 mg total) by mouth every evening. for 14 days 14 tablet 0    cyclobenzaprine (FLEXERIL) 10 MG tablet Take 10 mg by mouth 3 (three) times daily as needed.      hydroCHLOROthiazide (HYDRODIURIL) 12.5 MG Tab Take 12.5 mg by mouth once daily.      mometasone-formoterol (DULERA) 100-5 mcg/actuation HFAA Inhale 200 mcg into the lungs 2 (two) times daily as needed. Controller      omeprazole (PRILOSEC) 20 MG capsule Take 1 capsule (20 mg total) by mouth once daily. 30 capsule 11    PROAIR HFA 90 mcg/actuation inhaler Inhale 2 puffs into the lungs every 4 (four) hours as needed. 18 g 11     No current facility-administered medications for this visit.       Objective:     LMP  (LMP Unknown)   BP Readings from Last 3 Encounters:   01/31/23 (!) 147/74   01/10/23 122/71   12/12/22 134/80     Wt Readings from Last 3 Encounters:   01/31/23 53.5 kg (118 lb)   01/10/23 53.7 kg (118 lb 6.2 oz)   12/12/22 54.9 kg (121 lb)     No results found for this or any previous visit (from the past 200 hour(s)).  There is no height or weight  on file to calculate BMI.    Physical Exam  Constitutional: NAD  Lungs: CTAB, No crackles, No wheezes  Cardiovascular: Normal heart sounds, No MRG  Abdomen: Soft, Nontender, No palpable hepatosplenomegaly, No abdominal masses  Assessment:   Lore Stallings is a 62 y.o. female with:  Sliding Hiatal Hernia   LUQ and RUQ pain 2/2 to suspected Gall Bladder as etiology  Rectal mass  Left posterior quadriceps with paresthesia    Plan:   Hiatal Hernia   LUQ and RUQ pain 2/2 to suspected Gall Bladder as etiology  Referred patient to general surgeon in [place] who accepts Medicaid for possible repair of the sliding hiatal hernia and gallbladder    Rectal mass  Will continue to encourage cessation to address this issue  Perhaps she will be more amenable after the sliding hiatal hernia and the gallbladder is addressed      Asthma  Ordered nebulizer machine and DuoNebs    Paresthesia to left quadricept   Consider gabapentin if this does not resolve  Will continue to monitor      Return to clinic in 1 month for follow-up and to address health maintenance        Future Appointments   Date Time Provider Department Center   2/9/2023 10:40 AM RESIDENT 13, [facility] FAMILY MEDICINE [facility] FM RES Cj    3/27/2023  1:10 PM Tatiana Herron, ABDI [facility] GYN Cj    10/10/2023  1:40 PM NURSE PRACTITIONER, OCC [facility] OCC [facility] Carlos Naqvi       Staff: Dr. Lam Swartz MD  Family Medicine HO-3

## 2023-02-22 NOTE — PROGRESS NOTES
Faculty addendum: Patient discussed with resident. Chart was reviewed including vitals, labs, etc. Care provided reasonable and necessary. I participated in the management of the patient and was immediately available throughout the encounter. Services were furnished in a primary care center located in the outpatient department of a [facility] hospital. I agree with the resident's findings and plan as documented in the resident's note.

## 2023-03-07 ENCOUNTER — OFFICE VISIT (OUTPATIENT)
Dept: FAMILY MEDICINE | Facility: CLINIC | Age: 63
End: 2023-03-07
Payer: MEDICAID

## 2023-03-07 VITALS
WEIGHT: 120 LBS | TEMPERATURE: 98 F | BODY MASS INDEX: 18.83 KG/M2 | RESPIRATION RATE: 18 BRPM | SYSTOLIC BLOOD PRESSURE: 148 MMHG | HEART RATE: 88 BPM | OXYGEN SATURATION: 98 % | DIASTOLIC BLOOD PRESSURE: 62 MMHG | HEIGHT: 67 IN

## 2023-03-07 DIAGNOSIS — M81.0 OSTEOPOROSIS WITHOUT CURRENT PATHOLOGICAL FRACTURE, UNSPECIFIED OSTEOPOROSIS TYPE: Primary | ICD-10-CM

## 2023-03-07 DIAGNOSIS — J45.998 UNCONTROLLED PERSISTENT ASTHMA: ICD-10-CM

## 2023-03-07 DIAGNOSIS — J45.909 ASTHMA, UNSPECIFIED ASTHMA SEVERITY, UNSPECIFIED WHETHER COMPLICATED, UNSPECIFIED WHETHER PERSISTENT: ICD-10-CM

## 2023-03-07 DIAGNOSIS — Z12.11 COLON CANCER SCREENING: ICD-10-CM

## 2023-03-07 DIAGNOSIS — Z12.39 BREAST SCREENING: ICD-10-CM

## 2023-03-07 DIAGNOSIS — K21.9 GASTROESOPHAGEAL REFLUX DISEASE, UNSPECIFIED WHETHER ESOPHAGITIS PRESENT: ICD-10-CM

## 2023-03-07 PROCEDURE — 99214 OFFICE O/P EST MOD 30 MIN: CPT | Mod: PBBFAC | Performed by: NURSE PRACTITIONER

## 2023-03-07 RX ORDER — OMEPRAZOLE 20 MG/1
40 CAPSULE, DELAYED RELEASE ORAL DAILY
Qty: 60 CAPSULE | Refills: 5 | Status: SHIPPED | OUTPATIENT
Start: 2023-03-07 | End: 2023-05-16 | Stop reason: SDUPTHER

## 2023-03-07 RX ORDER — IPRATROPIUM BROMIDE AND ALBUTEROL SULFATE 2.5; .5 MG/3ML; MG/3ML
3 SOLUTION RESPIRATORY (INHALATION) EVERY 6 HOURS PRN
Qty: 75 ML | Refills: 6 | Status: SHIPPED | OUTPATIENT
Start: 2023-03-07 | End: 2023-05-16 | Stop reason: SDUPTHER

## 2023-03-07 RX ORDER — ALENDRONATE SODIUM 10 MG/1
10 TABLET ORAL DAILY
Qty: 90 TABLET | Refills: 0 | Status: SHIPPED | OUTPATIENT
Start: 2023-03-07 | End: 2023-05-16 | Stop reason: SDUPTHER

## 2023-03-08 ENCOUNTER — TELEPHONE (OUTPATIENT)
Dept: FAMILY MEDICINE | Facility: CLINIC | Age: 63
End: 2023-03-08
Payer: MEDICAID

## 2023-03-08 NOTE — TELEPHONE ENCOUNTER
This nurse spoke with the patient informing her that she has a 0930 appt on Monday 03/13/23 @ [facility], the address which is [address], [place] [address] along with the telephone number [phone] was given to the patient as well.

## 2023-03-08 NOTE — PROGRESS NOTES
Family Medicine Clinic Note:    PCP: Kyra Reed MD    Lore Stallings is a 62 y.o. female past medical history with type 1 sliding hiatal hernia, rectal mass (high-grade dysplasia) for which she declined surgical intervention until her hiatal hernias repaired, osteoporosis (on alendronate), asthma uncontrolled, GERD, hypertension, multilevel lumbar degenerative disc disease, spondylosis, scattered the, recently hospitalized on 09/12/22 for bilateral upper quadrant abdominal pain secondary to pneumobilia in choledocholithiasis (received ERCP and stent placement which was eventually removed by Dr. Vazquez) who presents to clinic today for follow-up regarding sliding hiatal hernia in left upper quadrant right and right upper quadrant abdominal pain and requesting refill on alendronate      Subjective:   No acute complaints    Chronic  Sliding hiatal hernia   LUQ and RUQ pain 2/2 2 suspected gallbladder as etiology  Patient agrees to go to [facility] for her surgery  Continues to have abdominal pain without any new changes    Asthma uncontrolled  Requested nebulizer machine and nebulizer medications   Reports that she uses her rescue inhaler 2-3 times per day  Has failed  outpatient treatments to include Singulair and Advair  Followed by Dr. Gonzalez, pulmonologist      GERD  Patient increased her Prilosec to 40 mg and requesting refills  Stated that she was having increased GERD and this dosage has relieved her symptoms  No complaint of nausea, vomiting, burning pain to the abdomen or epigastric area, no blood in the urine or stool      HM:  -History of colonic polyps (Colonoscopy with polypectomy 11/3/2021)  -Mammogram 04/13/2021--> needs mammogram  -Hepatitis-C screen 09/12/2022  -Bone density scan 04/13/2021 (positive osteoporosis), needs bone density scan  -Smoker 1 pack per day for 53 years--> 26.5 pack years  -declined shingles vaccine  -declined declined HIV screen  - declined  "tetanus vaccine   -declined COVID vaccine  -Low-density CT screen done 06/15/2023 (No detrimental interval change from prior exam.  Right upper lobe pulmonary nodule is unchanged- 7 mm. Between 6-8 mm will need to repeat the scan at 6-12 months.  -lipid panel due 08/24/2026  -6/15/22 pt unable to perform PFT due to excessive coughing and the test was discontinued  10/31/22 EGD     Care Team:  [facility] Surgery  [facility] GI Dr. George IGLESIAS  As per HPI  Current Outpatient Medications   Medication Sig Dispense Refill    cyclobenzaprine (FLEXERIL) 10 MG tablet Take 10 mg by mouth 3 (three) times daily as needed.      hydroCHLOROthiazide (HYDRODIURIL) 12.5 MG Tab Take 12.5 mg by mouth once daily.      mometasone-formoterol (DULERA) 100-5 mcg/actuation HFAA Inhale 200 mcg into the lungs 2 (two) times daily as needed. Controller      PROAIR HFA 90 mcg/actuation inhaler Inhale 2 puffs into the lungs every 4 (four) hours as needed. 18 g 11    albuterol-ipratropium (DUO-NEB) 2.5 mg-0.5 mg/3 mL nebulizer solution Take 3 mLs by nebulization every 6 (six) hours as needed for Wheezing. Rescue 75 mL 6    alendronate (FOSAMAX) 10 MG Tab Take 1 tablet (10 mg total) by mouth once daily. 90 tablet 0    omeprazole (PRILOSEC) 20 MG capsule Take 2 capsules (40 mg total) by mouth once daily. 60 capsule 5     No current facility-administered medications for this visit.       Objective:     BP (!) 148/62 (BP Location: Left arm, Patient Position: Sitting, BP Method: Medium (Automatic))   Pulse 88   Temp 97.9 °F (36.6 °C) (Oral)   Resp 18   Ht 5' 7" (1.702 m)   Wt 54.4 kg (120 lb)   LMP  (LMP Unknown)   SpO2 98%   BMI 18.79 kg/m²   BP Readings from Last 3 Encounters:   03/07/23 (!) 148/62   02/09/23 130/79   01/31/23 (!) 147/74     Wt Readings from Last 3 Encounters:   03/07/23 54.4 kg (120 lb)   02/09/23 55 kg (121 lb 4.1 oz)   01/31/23 53.5 kg (118 lb)     No results found for this or any previous visit (from the past 200 " hour(s)).  Body mass index is 18.79 kg/m².    Physical Exam  Constitutional: NAD, sitting in chair and conversational  Lungs: CTAB, No crackles, No wheezes  Cardiovascular: RRR, No MRG  Abdomen: Soft, tender to bilateral abdominal upper quadrants, No palpable hepatosplenomegaly, No abdominal masses, No ascites  Extremities: No edema to bilateral lower extremities    Assessment:   Lore Stallings is a 62 y.o. female with:  Sliding hiatal hernia   LUQ and RUQ pain 2/2 2 suspected gallbladder as etiology  Asthma uncontrolled  GERD  Plan:   Sliding hiatal hernia   LUQ and RUQ pain 2/2 2 suspected gallbladder as etiology  Appointment to [place], [place] for her hiatal hernia surgery and evaluation of the gallbladder gallbladder pending      Asthma uncontrolled  Followed by Dr. Gonzalez, pulmonologist.  Keep next scheduled appointment  Ordered nebulizer, supplies, DuoNeb and rescue inhaler  Continue current asthma regimen     GERD  Increased Prilosec to 40 mg daily   10/31/22 EGD (see report)    Refilled: alendronate    HM:  Ordered mammogram today  Ordered bone density scan today        Follow up in about 3 months (around 6/7/2023) for f/up hiatal hernia.    Future Appointments   Date Time Provider Department Center   3/27/2023  1:10 PM ABDI [place] [facility] GYN [facility]   5/16/2023  1:00 PM RESIDENT 13, [facility] FAMILY MEDICINE [facility] FM RES [facility]   10/10/2023  1:40 PM NURSE PRACTITIONER, OCC [facility] O[facility] Naqvi       Staff: Dr. Adela Swartz MD  Family Medicine PGY-2

## 2023-03-23 LAB — NONINV COLON CA DNA+OCC BLD SCRN STL QL: POSITIVE

## 2023-03-25 NOTE — PROGRESS NOTES
I reviewed History, PE, A/P and chart was reviewed.  Services provided in the outpatient department of  a teaching facility, I was immediately available.  I agree with resident, care reasonable and necessary.   Management discussed with resident after visit.    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 - 6 adenomatous, large in rectum = high grade dysplasia  EGD - 11/2021 [facility]  CT 6/2022, LDCT ordered for 6/2023, quit date 7/2022       HH/gall bladder disease  Has appt in [place] for eval for surgery on  and gall bladder    Rectal mass- large mass prox rectum, located 8cm from anal verge and extended up 5cm, partially circumferential (70% lumen), also had 20mm transverse, other <6mm polyps per 11/2021 cscope at [facility], rectal mass  = high grade dysplasia, does not like to discuss, has declined surgery for this up to this point, may reconsider after  fixed, consider Oncology     Osteoporosis  Fosamax started 4/2021  Advanced for her age, despite risk factors, (/smoking), reviewed right hip X - no sig DJD that would throw  off t score, both hips > - 2.5, spine OK, denies dysphagia per GI notes, last EGD was 11/2021 - no sig esophagitis, caution with daily Fosamax and ensure she takes it correctly,has FU DEXA 3/2023 if progression of disease consider alternate agent, also needs low TSH addressed    Last VT D 27 1/2021 - needs recheck    Thyroid nodules  Small per 9/2021 US - may need future FU  Persistent Low TSH - needs FU, last level 2021  Concern in setting of advanced osteoporosis    Pulmonary nodule RUL 7-10mm stable  Asthma, prob COPD per pulmonary  but could not do PFT, may benefit from adding Spriva or using Trelegy in place of all to avoid daily commitment to nebs  Stable by CT chest  1/2022 - 6/2020 and followed by DR Jc Gonzalez (Pul)  Reverting to LDCT CT since these stable > 2 yrs  Pulm has LDCT ordered for 6/2023    Corrections to note: EGD was 11/3/2021, I cannot find EGD report for a 10/2022 procedure but will discuss with resident, GI clinic note does state EGD 11/3/2022 but those findings are from the 11/3/2021 procedure, notably no esophagitis on 11/2021 exam, pt has been on fosamax x 6 mos    Had ERCP 9/2022, then 11/2022(stent removal)noting no grossly abnl findings but no detailed exam esophagus/stomach    Recommendations  Check vitamin D on next lab, FU low TSH, juan j thryoid US in the future, monitor for dysphagia if remains on fosamax, address lipids on FU    Siobhan Chapman MD  [facility] [place]  [facility].

## 2023-03-26 NOTE — PROGRESS NOTES
Pt was informed of the cologuard results and states that she is not going to have the surgery to remove it.  She wants to have her gallbladder removed but I explained to her that no other surgery is going to be performed until the rectal surgery is taken care of.  She now accepts the presence of the rectal mass but does not want any intervention at this time.  Discussed risks and benefits of not removing the mass with patient who verbalized understanding.    Kyra Swartz MD, PGY-2  [facility] Family Medicine

## 2023-03-31 ENCOUNTER — TELEPHONE (OUTPATIENT)
Dept: FAMILY MEDICINE | Facility: CLINIC | Age: 63
End: 2023-03-31
Payer: MEDICAID

## 2023-03-31 NOTE — TELEPHONE ENCOUNTER
Returned call of the patient and no answer. Unable to leave a message for her at the [phone] due to voice mail full.  Telephoned [phone] and left a message with her boyfriend Henrik for her to call the clinic back when she is available but that I would be in the clinic seeing patients from 1-5 pm today.    Kyra Swartz MD, PGY-2  [facility] Family Medicine

## 2023-05-01 RX ORDER — CYCLOBENZAPRINE HCL 10 MG
10 TABLET ORAL 3 TIMES DAILY PRN
Qty: 30 TABLET | Refills: 0 | Status: CANCELLED | OUTPATIENT
Start: 2023-05-01

## 2023-05-09 ENCOUNTER — DOCUMENTATION ONLY (OUTPATIENT)
Dept: FAMILY MEDICINE | Facility: CLINIC | Age: 63
End: 2023-05-09
Payer: MEDICAID

## 2023-05-09 RX ORDER — CYCLOBENZAPRINE HCL 10 MG
10 TABLET ORAL 3 TIMES DAILY PRN
Qty: 90 TABLET | Refills: 0 | OUTPATIENT
Start: 2023-05-09

## 2023-05-09 NOTE — PROGRESS NOTES
Pt is requesting refill on cyclobenzaprine, which is contraindicated in hyperthyroidism. Pt has documented history of hyperthyroidism, however no recent labs to confirm (last TSH in 2021). Pt will need appt at the clinic to check thyroid function prior to sending more refills on cyclobenzaprine.     Tiara Champion M.D.  [facility] PGY-2

## 2023-05-16 ENCOUNTER — OFFICE VISIT (OUTPATIENT)
Dept: FAMILY MEDICINE | Facility: CLINIC | Age: 63
End: 2023-05-16
Payer: MEDICAID

## 2023-05-16 VITALS
RESPIRATION RATE: 18 BRPM | OXYGEN SATURATION: 98 % | BODY MASS INDEX: 20.19 KG/M2 | DIASTOLIC BLOOD PRESSURE: 84 MMHG | SYSTOLIC BLOOD PRESSURE: 148 MMHG | TEMPERATURE: 98 F | WEIGHT: 128.63 LBS | HEART RATE: 101 BPM | HEIGHT: 67 IN

## 2023-05-16 DIAGNOSIS — J45.909 ASTHMA, UNSPECIFIED ASTHMA SEVERITY, UNSPECIFIED WHETHER COMPLICATED, UNSPECIFIED WHETHER PERSISTENT: ICD-10-CM

## 2023-05-16 DIAGNOSIS — M51.36 LUMBAR DEGENERATIVE DISC DISEASE: ICD-10-CM

## 2023-05-16 DIAGNOSIS — M81.0 OSTEOPOROSIS WITHOUT CURRENT PATHOLOGICAL FRACTURE, UNSPECIFIED OSTEOPOROSIS TYPE: ICD-10-CM

## 2023-05-16 DIAGNOSIS — K21.9 GASTROESOPHAGEAL REFLUX DISEASE, UNSPECIFIED WHETHER ESOPHAGITIS PRESENT: Primary | ICD-10-CM

## 2023-05-16 PROCEDURE — 99214 OFFICE O/P EST MOD 30 MIN: CPT | Mod: PBBFAC

## 2023-05-16 RX ORDER — ALBUTEROL SULFATE 0.83 MG/ML
2.5 SOLUTION RESPIRATORY (INHALATION) EVERY 6 HOURS PRN
Qty: 75 ML | Refills: 1 | Status: SHIPPED | OUTPATIENT
Start: 2023-05-16 | End: 2023-06-16 | Stop reason: HOSPADM

## 2023-05-16 RX ORDER — OMEPRAZOLE 20 MG/1
40 CAPSULE, DELAYED RELEASE ORAL DAILY
Qty: 60 CAPSULE | Refills: 5 | Status: SHIPPED | OUTPATIENT
Start: 2023-05-16 | End: 2023-06-16 | Stop reason: SDUPTHER

## 2023-05-16 RX ORDER — PEN NEEDLE, DIABETIC 31 GX5/16"
1 NEEDLE, DISPOSABLE MISCELLANEOUS DAILY
Qty: 1 EACH | Refills: 0 | Status: SHIPPED | OUTPATIENT
Start: 2023-05-16 | End: 2023-05-16

## 2023-05-16 RX ORDER — CYCLOBENZAPRINE HCL 10 MG
10 TABLET ORAL 3 TIMES DAILY PRN
Qty: 30 TABLET | Refills: 0 | Status: SHIPPED | OUTPATIENT
Start: 2023-05-16 | End: 2023-05-16

## 2023-05-16 RX ORDER — IPRATROPIUM BROMIDE AND ALBUTEROL SULFATE 2.5; .5 MG/3ML; MG/3ML
3 SOLUTION RESPIRATORY (INHALATION) EVERY 6 HOURS PRN
Qty: 75 ML | Refills: 1 | Status: SHIPPED | OUTPATIENT
Start: 2023-05-16 | End: 2023-06-16 | Stop reason: SDUPTHER

## 2023-05-16 RX ORDER — PEN NEEDLE, DIABETIC 31 GX5/16"
1 NEEDLE, DISPOSABLE MISCELLANEOUS DAILY
Qty: 1 EACH | Refills: 0 | Status: SHIPPED | OUTPATIENT
Start: 2023-05-16

## 2023-05-16 RX ORDER — CYCLOBENZAPRINE HCL 10 MG
10 TABLET ORAL 3 TIMES DAILY PRN
Qty: 90 TABLET | Refills: 0 | Status: SHIPPED | OUTPATIENT
Start: 2023-05-16 | End: 2023-06-16 | Stop reason: SDUPTHER

## 2023-05-16 RX ORDER — ALENDRONATE SODIUM 10 MG/1
10 TABLET ORAL DAILY
Qty: 90 TABLET | Refills: 0 | Status: SHIPPED | OUTPATIENT
Start: 2023-05-16 | End: 2023-09-14 | Stop reason: SDUPTHER

## 2023-05-16 NOTE — PROGRESS NOTES
[facility] OFFICE VISIT NOTE  Lore Stallings  [id number]    Chief Complaint: Follow-up (F/u for hiatal hernia, BP check)    HPI    62 y.o. female     Hiatal Hernia   GERD  - saw surgeon in [facility], declines surgery at this time   - omeprazole 40 mg daily, controls symptoms of reflux   - denies abdominal pain, vomiting     Rectal mass, polyp with high grade dysplasia  - patient declines discussion at this time, does not want to seek treatment at this time      Osteoporosis   - patient stopped Fosamax due to side effects  - DEXA 4/2021- left femur -3.3, right femur -3.2, lumbar spine -1.3, repeat DEXA ordered 3/7/23  - declines any other treatment options at this time, discussed increased risk of fracture and disability, voices understanding of risks    HTN  - elevated in triage 148/84   - patient stopped taking medications due to side effects and concerns it was dropping BP too low   - patient not interested in restarting medications at this time    Asthma  - stable, requests Rx for nebulizer machine and refills on nebulizer solution     Chronic low back pain  - requests refill on cyclobenzaprine  - MRI lumbar spine 2/2022- lumbar degenerative disc disease and spondylosis   - on  cyclobenzaprine 10 mg TID prn, requests refill      ROS:  CONSTITUTIONAL: No unexplained weight loss    CARDIOVASCULAR: No chest pain    RESPIRATORY: No shortness of breath      PE:  Vitals:    05/16/23 1538   BP: (!) 148/84   Pulse: 101   Resp: 18   Temp: 98 °F (36.7 °C)   Repeat /88    General: appears well, in no acute distress   Eye: no scleral icterus   Neck: supple, no lymphadenopathy, no carotid bruits   Respiratory: clear to auscultation bilaterally, nonlabored respirations   Cardiovascular: regular rate and rhythm without murmurs   Gastrointestinal: soft, non-tender, non-distended, bowel sounds present   Genitourinary: no suprapubic tenderness   Extremities: no edema in bilateral lower extremities  Musculoskeletal: mild  bilateral lumbar tenderness with palpation, straight leg negative bilaterally, gait wnl      Assessment:   1. Gastroesophageal reflux disease, unspecified whether esophagitis present    2. Asthma, unspecified asthma severity, unspecified whether complicated, unspecified whether persistent    3. Lumbar degenerative disc disease    4. Osteoporosis without current pathological fracture, unspecified osteoporosis type      Plan:  - refills to include: albuterol and DuoNebs prn, fosamax 10 mg daily,  Flexeril 10 mg TID prn and omeprazole 40 mg daily  - Rx nebulizer machine  - repeat BP improved, patient declines treatment of HTN at this time  - patient declines further referral for management of rectal mass until she gets her hiatal hernia taken care of although declines surgical intervention at this time, briefly discussed risks of prolonging management of mass although patient quickly declines discussion   - consider labs at next visit    Return to clinic in 4 weeks for healthcare maintenance, or sooner if needed.     Mandie Slater M.D. HO-II  [facility] Family Medicine

## 2023-05-18 ENCOUNTER — TELEPHONE (OUTPATIENT)
Dept: FAMILY MEDICINE | Facility: CLINIC | Age: 63
End: 2023-05-18
Payer: MEDICAID

## 2023-05-18 NOTE — TELEPHONE ENCOUNTER
PA submitted for InnoSpire Essence Nebulizer on [email or web address], key #[id number], results pending. Per fax from [employer], covered formulary options may include Comp Air Compressor Nebulizer, HomeNeb with [employer]est[employer] and Ashley LC Plus Nebulizer.

## 2023-05-19 RX ORDER — NEBULIZER AND COMPRESSOR
EACH MISCELLANEOUS
COMMUNITY
Start: 2023-05-17 | End: 2023-07-24

## 2023-06-16 ENCOUNTER — OFFICE VISIT (OUTPATIENT)
Dept: FAMILY MEDICINE | Facility: CLINIC | Age: 63
End: 2023-06-16
Payer: MEDICAID

## 2023-06-16 VITALS
DIASTOLIC BLOOD PRESSURE: 71 MMHG | OXYGEN SATURATION: 96 % | HEIGHT: 67 IN | RESPIRATION RATE: 18 BRPM | WEIGHT: 129 LBS | TEMPERATURE: 98 F | BODY MASS INDEX: 20.25 KG/M2 | HEART RATE: 101 BPM | SYSTOLIC BLOOD PRESSURE: 126 MMHG

## 2023-06-16 DIAGNOSIS — K21.9 GASTROESOPHAGEAL REFLUX DISEASE, UNSPECIFIED WHETHER ESOPHAGITIS PRESENT: ICD-10-CM

## 2023-06-16 DIAGNOSIS — J45.909 ASTHMA, UNSPECIFIED ASTHMA SEVERITY, UNSPECIFIED WHETHER COMPLICATED, UNSPECIFIED WHETHER PERSISTENT: Primary | ICD-10-CM

## 2023-06-16 DIAGNOSIS — R89.9 ABNORMAL LABORATORY TEST RESULT: ICD-10-CM

## 2023-06-16 DIAGNOSIS — M81.0 OSTEOPOROSIS WITHOUT CURRENT PATHOLOGICAL FRACTURE, UNSPECIFIED OSTEOPOROSIS TYPE: ICD-10-CM

## 2023-06-16 PROCEDURE — 99213 OFFICE O/P EST LOW 20 MIN: CPT | Mod: PBBFAC

## 2023-06-16 RX ORDER — CYCLOBENZAPRINE HCL 10 MG
10 TABLET ORAL 3 TIMES DAILY PRN
Qty: 90 TABLET | Refills: 0 | Status: SHIPPED | OUTPATIENT
Start: 2023-06-16 | End: 2023-06-18

## 2023-06-16 RX ORDER — OMEPRAZOLE 20 MG/1
40 CAPSULE, DELAYED RELEASE ORAL DAILY
Qty: 60 CAPSULE | Refills: 5 | Status: SHIPPED | OUTPATIENT
Start: 2023-06-16 | End: 2023-07-25 | Stop reason: SDUPTHER

## 2023-06-16 RX ORDER — IPRATROPIUM BROMIDE AND ALBUTEROL SULFATE 2.5; .5 MG/3ML; MG/3ML
3 SOLUTION RESPIRATORY (INHALATION) EVERY 6 HOURS PRN
Qty: 75 ML | Refills: 1 | Status: SHIPPED | OUTPATIENT
Start: 2023-06-16 | End: 2023-07-25 | Stop reason: SDUPTHER

## 2023-06-16 RX ORDER — ALBUTEROL SULFATE 90 UG/1
2 AEROSOL, METERED RESPIRATORY (INHALATION) EVERY 4 HOURS PRN
Qty: 18 G | Refills: 11 | Status: SHIPPED | OUTPATIENT
Start: 2023-06-16 | End: 2023-07-24 | Stop reason: SDUPTHER

## 2023-06-16 NOTE — PROGRESS NOTES
"[facility] OFFICE VISIT NOTE  MRN: [id number]  Date: 06/16/2023    Chief Complaint: Diarrhea (Patient says because of her gallbladder she is having diarrhea everyday and having to take medication.) and Medication Refill (All medications.)    Subjective:    SAYDA Stallings is a 63 y.o. female w/PMH of GERD, hietal Hernia, rectal mass, osteoporosis, HTN, Asthama, Chronic low back pain for which she takes cyclobenzaprine presenting to [facility] for follow up.    Her only acute complaint is on that has been ongoing. Patient having diarrhea secondary to gallbladder dysfunction. Told that it needs to be removed but no has done so. Did have a biliary stent? Placed per patient with Dr. Sandy and wants her to take it out. Explained to patient that Dr. Sandy does not do abdominal surgery and that she would have to see a general surgeon to have gall bladder removed. It appears that patient did she general surgery on 1/2023. She was referred to "[facility]" per note. But wondering if [place] [facility] [place]. Does not want to complete labs. Does not want to do a biopsy of rectal mass/intervention regarding that until her gall bladder has been removed. She reports weight gain instead of weight loss. Which is consistent with EMR vital documentation.     Pt states that she had an abdominal mass before which turned out to be benign after years. She is " pretty sure this one is the same".     Per EMR review it appears she did not go to [place] appt,     Health maintenance (per previous documentation)  -Mammo was scheduled for 3/2023, pt did not complete  -DEXA 4/2021. Fosamax started 2021. Did not complete repeat DEXA.   -PAP 11//2019 NL HPV neg. S/p DARINEL BSO 1/2020   -Colorectal CA screening: Cscope 11/2021 [facility] - 6 adenomatous, large in rectum = high grade dysplasia. EGD - 11/2021 [facility] with no esophagitis   -Lung CA screening: CT 6/2022, LDCT ordered for 6/27/2023. Patient quit smoking in 7/2022  **of note multiple cancelled " "appointments and no shows in regards to health maintenance.     Review of Systems  Constitutional: no fever, no chills  CV: no chest pain, no palpitations  Resp: no shortness of breath, no cough, no wheezing  GI: no abdominal pain, see rest of HPI  : no dysuria, no increased frequency  Neuro: No headache, no dizziness, no lightheadedness  MSK: see HPI  Skin: no rash    Current Medications:   Current Outpatient Medications   Medication Sig Dispense Refill    albuterol-ipratropium (DUO-NEB) 2.5 mg-0.5 mg/3 mL nebulizer solution Take 3 mLs by nebulization every 6 (six) hours as needed for Wheezing or Shortness of Breath. Rescue 75 mL 1    alendronate (FOSAMAX) 10 MG Tab Take 1 tablet (10 mg total) by mouth once daily. 90 tablet 0    cyclobenzaprine (FLEXERIL) 10 MG tablet Take 1 tablet (10 mg total) by mouth 3 (three) times daily as needed for Muscle spasms. 90 tablet 0    INNOSPIRE ESSENCE [place] use as directed      mometasone-formoterol (DULERA) 100-5 mcg/actuation HFAA Inhale 200 mcg into the lungs 2 (two) times daily as needed. Controller      nebulizers Misc 1 each by Misc.(Non-Drug; Combo Route) route once daily. 1 each 0    omeprazole (PRILOSEC) 20 MG capsule Take 2 capsules (40 mg total) by mouth once daily. 60 capsule 5    PROAIR HFA 90 mcg/actuation inhaler Inhale 2 puffs into the lungs every 4 (four) hours as needed. 18 g 11     No current facility-administered medications for this visit.       Objective:  Blood pressure 126/71, pulse 101, temperature 98.4 °F (36.9 °C), temperature source Oral, resp. rate 18, height 5' 7" (1.702 m), weight 58.5 kg (129 lb), SpO2 96 %.   Physical Exam  General: in no acute distress, short with words at first, but started to show some brief understanding.   Eye: Pclear conjunctiva, eyelids normal  HENT: MMM  Respiratory: clear to auscultation bilaterally. No wheezes  Cardiovascular: regular rate and rhythm. No murmurs.  Gastrointestinal: soft, non-tender, non-distended with " "normal bowel sounds  Musculoskeletal: tenderness to palpation of lumbar spine and prespinal muscles. No overlying skin changes.   Extremities: No peripheral edema  Neurologic: Alert and oriented x3      Assessment:   1. Osteoporosis without current pathological fracture, unspecified osteoporosis type    2. Asthma, unspecified asthma severity, unspecified whether complicated, unspecified whether persistent    3. Gastroesophageal reflux disease, unspecified whether esophagitis present        Plan:  -Refilled meds   -Pt with hx of thyroid nodules and low TSH, needs repeat thyroid functions. Which were ordered on multiple occasions by her PCP. Never completed. New orders have been placed   -Did also refill the cyclobenzaprine, will cancel for now. Saw that it was filled on 5/16. Need to do more investigation. Will call pharmacy. Will also call patient to remind of labwork as she did not want to complete labs in office today.   -Extensive discussion about the meaning of her results such as colonoscopy, cologuard, and surgery plan.  -Patient does not want to travel. Says "that's out of the question". Wants care to remain in [place]    Return to clinic in 1 month for follow up. Needs goals of care discussion. It seems she has poor insight into her conditions. Needs more conversations about each one and the appropriate interventions with each.    Maile Johnston MD  [employer] FM Resident, HO-3      "

## 2023-06-18 NOTE — PROGRESS NOTES
I reviewed History, PE, A/P and chart was reviewed.  Services provided in the outpatient department of a teaching facility, I was immediately available.  I agree with resident, care reasonable and appropriate.       See my 3/2023 addendum for full details and recommendations  Pt was seen [facility] 1/2023 and asked to go to [facility] in [facility] for further care  If travelling is not an option and pt willing to have surgery for rectal mass we can try to find a way to do this locally    R/B delay in care/diagnosis and associated poor outcome has been discussed with patient multiple times by FM resident and in surgery clinic. FM staff must see this patient and speak to her on FU to continue risk/benefit discussion

## 2023-06-27 ENCOUNTER — HOSPITAL ENCOUNTER (OUTPATIENT)
Dept: RADIOLOGY | Facility: HOSPITAL | Age: 63
Discharge: HOME | End: 2023-06-27
Payer: MEDICAID

## 2023-06-27 DIAGNOSIS — R91.1 SOLITARY PULMONARY NODULE: ICD-10-CM

## 2023-06-27 PROCEDURE — 71271 CT THORAX LUNG CANCER SCR C-: CPT | Mod: TC

## 2023-07-24 ENCOUNTER — OFFICE VISIT (OUTPATIENT)
Dept: FAMILY MEDICINE | Facility: CLINIC | Age: 63
End: 2023-07-24
Payer: MEDICAID

## 2023-07-24 VITALS
WEIGHT: 130 LBS | SYSTOLIC BLOOD PRESSURE: 125 MMHG | TEMPERATURE: 99 F | BODY MASS INDEX: 20.4 KG/M2 | DIASTOLIC BLOOD PRESSURE: 69 MMHG | HEART RATE: 99 BPM | HEIGHT: 67 IN | RESPIRATION RATE: 18 BRPM | OXYGEN SATURATION: 96 %

## 2023-07-24 DIAGNOSIS — J45.909 ASTHMA, UNSPECIFIED ASTHMA SEVERITY, UNSPECIFIED WHETHER COMPLICATED, UNSPECIFIED WHETHER PERSISTENT: ICD-10-CM

## 2023-07-24 DIAGNOSIS — E04.1 THYROID NODULE: ICD-10-CM

## 2023-07-24 DIAGNOSIS — D64.9 ANEMIA, UNSPECIFIED TYPE: ICD-10-CM

## 2023-07-24 DIAGNOSIS — E78.5 HYPERLIPIDEMIA, UNSPECIFIED HYPERLIPIDEMIA TYPE: ICD-10-CM

## 2023-07-24 DIAGNOSIS — Z13.1 SCREENING FOR DIABETES MELLITUS: ICD-10-CM

## 2023-07-24 DIAGNOSIS — M81.0 OSTEOPOROSIS WITHOUT CURRENT PATHOLOGICAL FRACTURE, UNSPECIFIED OSTEOPOROSIS TYPE: Primary | ICD-10-CM

## 2023-07-24 DIAGNOSIS — K21.9 GASTROESOPHAGEAL REFLUX DISEASE, UNSPECIFIED WHETHER ESOPHAGITIS PRESENT: ICD-10-CM

## 2023-07-24 LAB
ALBUMIN SERPL-MCNC: 3.8 G/DL (ref 3.4–4.8)
ALBUMIN/GLOB SERPL: 1.1 RATIO (ref 1.1–2)
ALP SERPL-CCNC: 70 UNIT/L (ref 40–150)
ALT SERPL-CCNC: 10 UNIT/L (ref 0–55)
AST SERPL-CCNC: 20 UNIT/L (ref 5–34)
BASOPHILS # BLD AUTO: 0.05 X10(3)/MCL
BASOPHILS NFR BLD AUTO: 0.4 %
BILIRUBIN DIRECT+TOT PNL SERPL-MCNC: 0.3 MG/DL
BUN SERPL-MCNC: 13.8 MG/DL (ref 9.8–20.1)
CALCIUM SERPL-MCNC: 9.9 MG/DL (ref 8.4–10.2)
CHLORIDE SERPL-SCNC: 106 MMOL/L (ref 98–107)
CHOLEST SERPL-MCNC: 256 MG/DL
CHOLEST/HDLC SERPL: 5 {RATIO} (ref 0–5)
CO2 SERPL-SCNC: 28 MMOL/L (ref 23–31)
CREAT SERPL-MCNC: 0.74 MG/DL (ref 0.55–1.02)
DEPRECATED CALCIDIOL+CALCIFEROL SERPL-MC: 25 NG/ML (ref 30–80)
EOSINOPHIL # BLD AUTO: 0.17 X10(3)/MCL (ref 0–0.9)
EOSINOPHIL NFR BLD AUTO: 1.5 %
ERYTHROCYTE [DISTWIDTH] IN BLOOD BY AUTOMATED COUNT: 13.8 % (ref 11.5–17)
EST. AVERAGE GLUCOSE BLD GHB EST-MCNC: 102.5 MG/DL
GFR SERPLBLD CREATININE-BSD FMLA CKD-EPI: >60 MLS/MIN/1.73/M2
GLOBULIN SER-MCNC: 3.5 GM/DL (ref 2.4–3.5)
GLUCOSE SERPL-MCNC: 114 MG/DL (ref 82–115)
HBA1C MFR BLD: 5.2 %
HCT VFR BLD AUTO: 40.5 % (ref 37–47)
HDLC SERPL-MCNC: 50 MG/DL (ref 35–60)
HGB BLD-MCNC: 12.9 G/DL (ref 12–16)
IMM GRANULOCYTES # BLD AUTO: 0.05 X10(3)/MCL (ref 0–0.04)
IMM GRANULOCYTES NFR BLD AUTO: 0.4 %
IRON SATN MFR SERPL: 9 % (ref 20–50)
IRON SERPL-MCNC: 32 UG/DL (ref 50–170)
LDLC SERPL CALC-MCNC: 144 MG/DL (ref 50–140)
LYMPHOCYTES # BLD AUTO: 3.03 X10(3)/MCL (ref 0.6–4.6)
LYMPHOCYTES NFR BLD AUTO: 25.9 %
MCH RBC QN AUTO: 29.6 PG (ref 27–31)
MCHC RBC AUTO-ENTMCNC: 31.9 G/DL (ref 33–36)
MCV RBC AUTO: 92.9 FL (ref 80–94)
MONOCYTES # BLD AUTO: 0.55 X10(3)/MCL (ref 0.1–1.3)
MONOCYTES NFR BLD AUTO: 4.7 %
NEUTROPHILS # BLD AUTO: 7.83 X10(3)/MCL (ref 2.1–9.2)
NEUTROPHILS NFR BLD AUTO: 67.1 %
NRBC BLD AUTO-RTO: 0 %
PLATELET # BLD AUTO: 324 X10(3)/MCL (ref 130–400)
PMV BLD AUTO: 9.8 FL (ref 7.4–10.4)
POTASSIUM SERPL-SCNC: 4 MMOL/L (ref 3.5–5.1)
PROT SERPL-MCNC: 7.3 GM/DL (ref 5.8–7.6)
RBC # BLD AUTO: 4.36 X10(6)/MCL (ref 4.2–5.4)
SODIUM SERPL-SCNC: 145 MMOL/L (ref 136–145)
T4 FREE SERPL-MCNC: 0.83 NG/DL (ref 0.7–1.48)
TIBC SERPL-MCNC: 305 UG/DL (ref 70–310)
TIBC SERPL-MCNC: 337 UG/DL (ref 250–450)
TRANSFERRIN SERPL-MCNC: 285 MG/DL (ref 173–360)
TRIGL SERPL-MCNC: 310 MG/DL (ref 37–140)
TSH SERPL-ACNC: 0.17 UIU/ML (ref 0.35–4.94)
VLDLC SERPL CALC-MCNC: 62 MG/DL
WBC # SPEC AUTO: 11.68 X10(3)/MCL (ref 4.5–11.5)

## 2023-07-24 PROCEDURE — 99214 OFFICE O/P EST MOD 30 MIN: CPT | Mod: PBBFAC | Performed by: STUDENT IN AN ORGANIZED HEALTH CARE EDUCATION/TRAINING PROGRAM

## 2023-07-24 PROCEDURE — 36415 COLL VENOUS BLD VENIPUNCTURE: CPT | Performed by: STUDENT IN AN ORGANIZED HEALTH CARE EDUCATION/TRAINING PROGRAM

## 2023-07-24 PROCEDURE — 85025 COMPLETE CBC W/AUTO DIFF WBC: CPT | Performed by: STUDENT IN AN ORGANIZED HEALTH CARE EDUCATION/TRAINING PROGRAM

## 2023-07-24 PROCEDURE — 84439 ASSAY OF FREE THYROXINE: CPT | Performed by: STUDENT IN AN ORGANIZED HEALTH CARE EDUCATION/TRAINING PROGRAM

## 2023-07-24 PROCEDURE — 84443 ASSAY THYROID STIM HORMONE: CPT | Performed by: STUDENT IN AN ORGANIZED HEALTH CARE EDUCATION/TRAINING PROGRAM

## 2023-07-24 PROCEDURE — 82306 VITAMIN D 25 HYDROXY: CPT | Performed by: STUDENT IN AN ORGANIZED HEALTH CARE EDUCATION/TRAINING PROGRAM

## 2023-07-24 PROCEDURE — 80053 COMPREHEN METABOLIC PANEL: CPT | Performed by: STUDENT IN AN ORGANIZED HEALTH CARE EDUCATION/TRAINING PROGRAM

## 2023-07-24 PROCEDURE — 83036 HEMOGLOBIN GLYCOSYLATED A1C: CPT | Performed by: STUDENT IN AN ORGANIZED HEALTH CARE EDUCATION/TRAINING PROGRAM

## 2023-07-24 PROCEDURE — 80061 LIPID PANEL: CPT | Performed by: STUDENT IN AN ORGANIZED HEALTH CARE EDUCATION/TRAINING PROGRAM

## 2023-07-24 PROCEDURE — 83540 ASSAY OF IRON: CPT | Performed by: STUDENT IN AN ORGANIZED HEALTH CARE EDUCATION/TRAINING PROGRAM

## 2023-07-24 RX ORDER — ALBUTEROL SULFATE 90 UG/1
2 AEROSOL, METERED RESPIRATORY (INHALATION) EVERY 4 HOURS PRN
Qty: 18 G | Refills: 11 | Status: SHIPPED | OUTPATIENT
Start: 2023-07-24 | End: 2023-08-08 | Stop reason: CLARIF

## 2023-07-24 NOTE — PROGRESS NOTES
[facility] OFFICE VISIT NOTE  Lore Stallings  [id number]  07/25/2023      Chief Complaint: Medication Refill      HPI    63 y.o. female     Acute concerns:    Request refill on ProAir prn; states generic makes her more short of breath and has coughing/gagging episodes after use. COPD controlled. Happy with current regimen. Follows [facility]. Last LDCT 6/2023, recommend repeat in 1 year. No cough, sputum production, hemoptysis, unexplained weight loss.      Has appointment with GYN for annual exam 11/2023    Patient considering follow up with [facility] for re-evaluation of gallbladder. Last visit 1/31/23. H/o pneumobilia and choledocholithiasis w/ cholangitis with previous ERCP/ stent placement and subsequent removal of stent. States symptoms of gallbladder dysfunction (chronic diarrhea, intermittent abdominal pain) stable. Appears to have [facility] for [facility] of hiatal hernia and gallbladder, but did not attend appointments. Adamant that she is not willing to travel outside of [place] to seek medical care and would like to follow up with [facility].  Patient with known rectal mass concerning for malignancy (previous biopsy with high grade dysplasia). Patient has previously declined further discussion and work up on multiple occasions. When brought up during this visit, patient quickly changes the subject and states she does not want to discuss further. Patient acknowledges the high concern for malignancy, recommendation for urgent work up and risks of not pursuing recommended work up and treatment especially in a timely manner.     Chronic conditions:  Osteoporosis- Fosamax 10 mg daily (initiated 2021 per note 6/2023), no recent falls   Thyroid nodules- US thyroid 9/2021- sub centimeter nodules, slight heterogenicity and increased vascularity, TSH low, T4 nml 8/2021  GERD- on Protonix 40 mg daily  COPD- follows Dr. Gonzalez (Pulmonology)  Chronic low back pain-  MRI lumbar spine 2/2022- lumbar degenerative disc disease and spondylosis, on cyclobenzaprine 10 mg TID prn    Healthcare Maintenance  Breast cancer screening: ordered 3/2023- not completed, informed refusal for repeat order today 2/2 shoulder pain 11/2023  Colon cancer screening: colonoscopy 11/2021- 20 mm polyp transverse colon, two 3 mm sigmoid polyps, two 3 mm rectal polyps, 5 cm in length frond-like/villous and sessile non obstructing large mass/polyp in proximal rectum (noted to be high grade dysplasia in previous notes), recommend referral to colo-rectal surgeon and repeat colonoscopy in 1 year, prep poor; informed refusal further work up as noted above   Cervical cancer screening: PAP 11/2019 NL HPV neg. S/p DARINEL BSO 1/2020   Lung cancer screening: quit smoking 7/2022, LDCT 6/2023 Lung-RADS 2  Osteoporosis screening: ordered 3/2023, not completed  Immunizations: unable to assess LINKs   ASCVD risk: 6.3%        ROS:  CONSTITUTIONAL: No unexplained weight loss    CARDIOVASCULAR: No chest pain    RESPIRATORY: No shortness of breath      PE:  Vitals:    07/24/23 1335   BP: 125/69   Pulse: 99   Resp: 18   Temp: 99.2 °F (37.3 °C)     General: thin, chronically ill appearing, no acute distress   Eye: no scleral icterus   Neck: no carotid bruits   Respiratory: non labored, mildly distant breath sounds, clear to auscultation bilaterally  Cardiovascular: regular rate and rhythm without murmurs   Gastrointestinal: soft, non-tender, non-distended, bowel sounds present   Extremities: no edema in bilateral lower extremities    Assessment:   1. Osteoporosis without current pathological fracture, unspecified osteoporosis type    2. Screening for diabetes mellitus    3. Hyperlipidemia, unspecified hyperlipidemia type    4. Thyroid nodule    5. Anemia, unspecified type    6. Gastroesophageal reflux disease, unspecified whether esophagitis present    7. Asthma, unspecified asthma severity, unspecified whether complicated,  unspecified whether persistent        Plan:  - refill ProAir (name brand requested), DuoNeb, flexeril and Prilosec  - DEXA  - labs: CBC, CMP, A1c, iron/TIBC, Vit D, lipid panel, TSH and T4  - thyroid US   - emphasized the risks of declining further working up and/or treatment of known rectal mass; patient quickly declines further discussion, but expressed understanding of high concern for malignancy, recommendation for urgent work up and risks of not pursuing recommended work up and treatment especially in a timely manner  - patient to call to schedule follow up with [facility], will also message office to facilitate follow up    Return to clinic in 2 months for routine follow up, or sooner if needed.     Mandie Slater M.D. -III  [facility]

## 2023-07-25 RX ORDER — CYCLOBENZAPRINE HCL 10 MG
10 TABLET ORAL 3 TIMES DAILY PRN
Qty: 90 TABLET | Refills: 1 | Status: SHIPPED | OUTPATIENT
Start: 2023-07-25 | End: 2023-09-14 | Stop reason: SDUPTHER

## 2023-07-25 RX ORDER — IPRATROPIUM BROMIDE AND ALBUTEROL SULFATE 2.5; .5 MG/3ML; MG/3ML
3 SOLUTION RESPIRATORY (INHALATION) EVERY 6 HOURS PRN
Qty: 75 ML | Refills: 3 | Status: SHIPPED | OUTPATIENT
Start: 2023-07-25 | End: 2023-09-14 | Stop reason: SDUPTHER

## 2023-07-25 RX ORDER — OMEPRAZOLE 20 MG/1
40 CAPSULE, DELAYED RELEASE ORAL DAILY
Qty: 60 CAPSULE | Refills: 5 | Status: SHIPPED | OUTPATIENT
Start: 2023-07-25 | End: 2023-09-14 | Stop reason: SDUPTHER

## 2023-07-31 ENCOUNTER — HOSPITAL ENCOUNTER (OUTPATIENT)
Dept: RADIOLOGY | Facility: HOSPITAL | Age: 63
Discharge: HOME | End: 2023-07-31
Attending: STUDENT IN AN ORGANIZED HEALTH CARE EDUCATION/TRAINING PROGRAM
Payer: MEDICAID

## 2023-07-31 DIAGNOSIS — E04.1 THYROID NODULE: ICD-10-CM

## 2023-07-31 DIAGNOSIS — M81.0 OSTEOPOROSIS WITHOUT CURRENT PATHOLOGICAL FRACTURE, UNSPECIFIED OSTEOPOROSIS TYPE: ICD-10-CM

## 2023-07-31 PROCEDURE — 77080 DXA BONE DENSITY AXIAL: CPT | Mod: TC

## 2023-07-31 PROCEDURE — 76536 US EXAM OF HEAD AND NECK: CPT | Mod: TC

## 2023-08-03 DIAGNOSIS — K80.50 CHOLEDOCHOLITHIASIS: Primary | ICD-10-CM

## 2023-08-03 DIAGNOSIS — K83.8 PNEUMOBILIA: ICD-10-CM

## 2023-08-15 ENCOUNTER — OFFICE VISIT (OUTPATIENT)
Dept: SURGERY | Facility: CLINIC | Age: 63
End: 2023-08-15
Payer: MEDICAID

## 2023-08-15 VITALS
WEIGHT: 127.81 LBS | HEIGHT: 66 IN | DIASTOLIC BLOOD PRESSURE: 77 MMHG | SYSTOLIC BLOOD PRESSURE: 151 MMHG | BODY MASS INDEX: 20.54 KG/M2 | HEART RATE: 107 BPM | OXYGEN SATURATION: 95 % | TEMPERATURE: 98 F

## 2023-08-15 DIAGNOSIS — K83.8 PNEUMOBILIA: ICD-10-CM

## 2023-08-15 DIAGNOSIS — K80.50 CHOLEDOCHOLITHIASIS: ICD-10-CM

## 2023-08-15 PROCEDURE — 99215 OFFICE O/P EST HI 40 MIN: CPT | Mod: PBBFAC

## 2023-08-15 NOTE — PROGRESS NOTES
Patient seen by Dr. RAYRAY Dawkins. Will return after ultrasound complete. Written and verbal discharge instructions given.

## 2023-08-15 NOTE — PROGRESS NOTES
[facility] Clinic Note    HPI:   This is a 64 y/o F with a PMH of asthma, GERD, HTN, chronic back pain, a rectal mass, and history of ovarian cancer s/p exploratory laparotomy with radical resection, previous appendectomy. She is here today to further discuss her surgical options after being diagnosed with choledocolithiasis last year which was treated with ERCP, sphincterotomy, stenting. She then subsequently underwent a stent removal in November. Further has a rectal mass diagnosed on colonscopy but is refusing to discuss the matter and actively becomes angry and yells when the topic is broached. She has been seen by multiple surgeons about her rectal mass and has become angry and hostile with each. Also has a hiatal hernia which she would not like to have fixed. She is fixated on her gallbladder as the source of her problems, however, it is unclear whether she still has a gallbladder after her cancer surgery years ago. Gallbladder has not been visualized on most recent abd US. Pt reports R flank, R abdominal pain, mainly in the right mid abdomen when asked to show where she is hurting. We were consulted at the time of her cholangitis and found that she had a very contracted gallbladder around a stone and that it was unlikely that this was the cause of her choledocolithiasis. Surgery was declined at that time given the chronicity and significant contracture of her gallbladder. She has had chronic diarrhea since having her ERCP stent removal last year. Denies any fevers or chills, denies any yellowing of her skin or eyes, denies any confusion.     PMH:   Past Medical History:   Diagnosis Date    Asthma     GERD (gastroesophageal reflux disease)     Hypertension     Lumbago     Personal history of colonic polyps 11/03/2021    Sciatica       Meds:   Current Outpatient Medications:     albuterol (PROVENTIL/VENTOLIN HFA) 90 mcg/actuation inhaler, Inhale 2 puffs into the lungs every 4 (four) hours as needed for  Wheezing. Rescue, Disp: 18 g, Rfl: 11    albuterol-ipratropium (DUO-NEB) 2.5 mg-0.5 mg/3 mL nebulizer solution, Take 3 mLs by nebulization every 6 (six) hours as needed for Wheezing or Shortness of Breath. Rescue, Disp: 75 mL, Rfl: 3    alendronate (FOSAMAX) 10 MG Tab, Take 1 tablet (10 mg total) by mouth once daily., Disp: 90 tablet, Rfl: 0    cyclobenzaprine (FLEXERIL) 10 MG tablet, Take 1 tablet (10 mg total) by mouth 3 (three) times daily as needed for Muscle spasms., Disp: 90 tablet, Rfl: 1    mometasone-formoterol (DULERA) 100-5 mcg/actuation HFAA, Inhale 200 mcg into the lungs 2 (two) times daily as needed. Controller, Disp: , Rfl:     nebulizers Misc, 1 each by Misc.(Non-Drug; Combo Route) route once daily., Disp: 1 each, Rfl: 0    omeprazole (PRILOSEC) 20 MG capsule, Take 2 capsules (40 mg total) by mouth once daily., Disp: 60 capsule, Rfl: 5  Allergies:   Review of patient's allergies indicates:   Allergen Reactions    Amoxicillin-pot clavulanate Hives     Other reaction(s): Swelling    Iodine and iodide containing products      Other reaction(s): Burning sensation    Sulfamethoxazole-trimethoprim      Other reaction(s): Constipation, Dizziness, Loss of appetite, Rapid heart beat, Shaking     Social History:   Social History     Tobacco Use    Smoking status: Former     Current packs/day: 0.00     Average packs/day: 1 pack/day for 53.0 years (53.0 ttl pk-yrs)     Types: Cigarettes     Start date: 1969     Quit date: 2022     Years since quittin.1     Passive exposure: Never    Smokeless tobacco: Never   Substance Use Topics    Alcohol use: Not Currently    Drug use: Never     Family History:   Family History   Problem Relation Age of Onset    Asthma Father     Heart disease Brother     Arthritis Brother      Surgical History:   Past Surgical History:   Procedure Laterality Date    APPENDECTOMY       SECTION      COLONOSCOPY      COLONOSCOPY W/ POLYPECTOMY  2021    ERCP W/  SPHICTEROTOMY N/A 09/13/2022    Procedure: ERCP, WITH SPHINCTEROTOMY;  Surgeon: Iris Sandy MD;  Location: [facility] ENDOSCOPY;  Service: Gastroenterology;  Laterality: N/A;    ESOPHAGOGASTRECTOMY      HYSTERECTOMY      POLYPECTOMY      RHINOPLASTY      TONSILLECTOMY       Review of Systems:  Skin: No rashes or itching.  Head: Denies headache or recent trauma.  Eyes: Denies eye pain or double vision.  Neck: Denies swelling or hoarseness of voice.  Respiratory: Denies shortness of breath or chest pain  Cardiac: Denies palpitations or swelling in hands/feet.  Gastrointestinal: reports diarrhea  Urinary: Denies dysuria or hematuria.  Vascular: Denies claudication or leg swelling.  Neuro: Denies motor deficits. Denies weakness.  Endocrine: Denies excessive sweating or cold intolerance.  Psych: Denies memory problems. Denies anxiety.    Objective:    Vitals:  Vitals:    08/15/23 1342   BP: (!) 151/77   Pulse: 107   Temp: 97.7 °F (36.5 °C)        Physical Exam:  Gen: NAD  Neuro: awake, alert, answering questions appropriately  CV: RRR  Resp: non-labored breathing, ROSIO  Abd: soft, ND, tender in the right flank and right midabdomen; no RUQ tenderness, neg peña sign  Ext: moves all 4 spontaneously and purposefully  Skin: warm, well perfused    Pertinent Labs:  WBC 4.50 - 11.50 x10(3)/mcL 11.68 High   6.3 R  12.1 High  R  10.4 R  10.1 R  9.9 R  13.0 High  R    RBC 4.20 - 5.40 x10(6)/mcL 4.36  4.13 Low   4.56  4.85  4.75  3.84 Low   3.59 Low     Hgb 12.0 - 16.0 g/dL 12.9  11.8 Low  R  13.1 R  13.8 R  13.8 R  11.2 Low  R  10.3 Low  R    Hct 37.0 - 47.0 % 40.5  37.7  41.8  43.4  42.7  34.5 Low   32.9 Low     MCV 80.0 - 94.0 fL 92.9  91.3  91.7  89.5  89.9  89.8  91.6    MCH 27.0 - 31.0 pg 29.6  28.6  28.7  28.5  29.1  29.2  28.7    MCHC 33.0 - 36.0 g/dL 31.9 Low   31.3 Low  R  31.3 Low  R  31.8 Low  R  32.3 Low  R  32.5 Low  R  31.3 Low  R    RDW 11.5 - 17.0 % 13.8  14.1  13.9  14.1  14.4  14.6  14.4    Platelet 130 - 400  x10(3)/mcL 324  271  349  317  306  251  230    MPV 7.4 - 10.4 fL 9.8  9.9  9.5  10.1  10.2  10.2  10.0    Neut % % 67.1  56.3         Lymph % % 25.9  33.7         Mono % % 4.7  6.0         Eos % % 1.5  3.2         Basophil % % 0.4  0.5         Lymph # 0.6 - 4.6 x10(3)/mcL 3.03  2.12         Neut # 2.1 - 9.2 x10(3)/mcL 7.83  3.6         Mono # 0.1 - 1.3 x10(3)/mcL 0.55  0.38         Eos # 0 - 0.9 x10(3)/mcL 0.17  0.20         Baso # <=0.2 x10(3)/mcL 0.05  0.03 R         IG# 0 - 0.04 x10(3)/mcL 0.05 High   0.02         IG% % 0.4  0.3         NRBC% % 0.0  0.0           Sodium Level 136 - 145 mmol/L 145  143  142  139  140  141  139    Potassium Level 3.5 - 5.1 mmol/L 4.0  3.7  4.2  3.8  3.6  3.4 Low   4.0    Chloride 98 - 107 mmol/L 106  106  105  102  100  101  106    Carbon Dioxide 23 - 31 mmol/L 28  29  27  25  26  31  26    Glucose Level 82 - 115 mg/dL 114  82  107  101  97  100  132 High     Blood Urea Nitrogen 9.8 - 20.1 mg/dL 13.8  7.5 Low   21.6 High   22.7 High   14.2  7.8 Low   8.7 Low     Creatinine 0.55 - 1.02 mg/dL 0.74  0.67  0.79  0.75  0.78  0.73  0.63    Calcium Level Total 8.4 - 10.2 mg/dL 9.9  8.8  9.3  9.7  10.6 High   9.7  8.9    Protein Total 5.8 - 7.6 gm/dL 7.3  6.8  6.8  7.2  7.6  6.3  5.5 Low     Albumin Level 3.4 - 4.8 g/dL 3.8  3.6 R  3.3 Low  R  3.4 R  3.6 R  3.0 Low  R  2.7 Low  R    Globulin 2.4 - 3.5 gm/dL 3.5  3.2  3.5  3.8 High   4.0 High   3.3  2.8    Albumin/Globulin Ratio 1.1 - 2.0 ratio 1.1  1.1  0.9 Low   0.9 Low   0.9 Low   0.9 Low   1.0 Low     Bilirubin Total <=1.5 mg/dL 0.3  0.6  0.9  1.6 High   1.3  1.2  1.9 High     Alkaline Phosphatase 40 - 150 unit/L 70  83  121  145  162 High   136  143    Alanine Aminotransferase 0 - 55 unit/L 10  10  100 High   138 High   148 High   173 High   227 High     Aspartate Aminotransferase 5 - 34 unit/L 20  16  38 High   77 High   43 High   41 High   96 High     eGFR mls/min/1.73/m2 >60  >60             Imaging:  US ABDOMEN LIMITED      CLINICAL HISTORY:  hepatitis;     TECHNIQUE:  Gray-scale and color Doppler ultrasound images of the abdomen.     COMPARISON:  Ultrasound 03/04/2022     CT abdomen/pelvis 02/18/2022     FINDINGS:  No pancreatic abnormality appreciated.     Main portal vein patent with normal flow direction.  Normal caliber of the superior IVC.     Cirrhotic liver morphology.  Underlying pneumobilia.  Gallbladder not seen.  Oval echogenic area in the distal portion of the common bile duct near the pancreatic head measures 12 mm with posterior shadowing.  No significant ascites.     No hydronephrosis or defined calcification in the right kidney.     Measurements:     - Liver: 14.4 cm     - CBD diameter: 10 mm     - Right kidney: 10.1 cm in length     Impression:     Gallbladder not visualized.  Borderline dilatation of the common bile duct for a presumably post cholecystectomy patient.  There is underlying pneumobilia, but a 12 mm echogenic area in the distal common bile duct may be choledocholithiasis.  If needed, CT can be considered for further assessment.  The pneumobilia may significantly limit the quality of any MRCP images.    Micro/Path/Other:  none    Assessment/Plan:  This is a 62 y/o F with a PMH of  asthma, GERD, HTN, chronic back pain, a rectal mass, and history of ovarian cancer s/p exploratory laparotomy with radical resection, previous appendectomy. She is here today to further discuss her surgical options after being diagnosed with cholangitis and being treated with ERCP and stenting which was removed in November of last year. At that time surgery was consulted and found her to have a chronically very contracted gallbladder with a stone within this contracted gallbladder, it was determined at that time that she did not require a cholecystectomy as it was very unlikely that her gallbladder was the source of her choledocholithiasis. She is reporting continue diarrhea since the stent was removed. She also has a rectal  mass which she refuses to have treated and becomes actively hostile when the topic is broached    - we will obtain a RUQ US to try to further visualize her gallbladder; pt refusing to have CT with contrast done due to her iodine allergy even if pretreated with benadryl and steroids   - will have patient RTC when US completed to discuss her options    John Dawkins MD   [employer] General Surgery PGY 3  08/15/2023 2:58 PM

## 2023-08-16 LAB
INR PPP: 1.1
PROTHROMBIN TIME: 13.9 SECONDS (ref 11.4–14)

## 2023-08-16 NOTE — PROGRESS NOTES
I have reviewed the notes, assessments, and/or procedures performed by the resident, I concur with her/his documentation of Lore Stallings.     Mary Lr MD

## 2023-08-18 ENCOUNTER — HOSPITAL ENCOUNTER (OUTPATIENT)
Dept: RADIOLOGY | Facility: HOSPITAL | Age: 63
Discharge: HOME | End: 2023-08-18
Attending: STUDENT IN AN ORGANIZED HEALTH CARE EDUCATION/TRAINING PROGRAM
Payer: MEDICAID

## 2023-08-18 DIAGNOSIS — K80.50 CHOLEDOCHOLITHIASIS: ICD-10-CM

## 2023-08-18 PROCEDURE — 76705 ECHO EXAM OF ABDOMEN: CPT | Mod: TC

## 2023-09-05 ENCOUNTER — OFFICE VISIT (OUTPATIENT)
Dept: SURGERY | Facility: CLINIC | Age: 63
End: 2023-09-05
Payer: MEDICAID

## 2023-09-05 VITALS
HEIGHT: 66 IN | BODY MASS INDEX: 20.73 KG/M2 | RESPIRATION RATE: 18 BRPM | SYSTOLIC BLOOD PRESSURE: 124 MMHG | TEMPERATURE: 98 F | DIASTOLIC BLOOD PRESSURE: 82 MMHG | WEIGHT: 129 LBS | HEART RATE: 107 BPM | OXYGEN SATURATION: 95 %

## 2023-09-05 DIAGNOSIS — R10.11 RUQ PAIN: Primary | ICD-10-CM

## 2023-09-05 PROCEDURE — 99214 OFFICE O/P EST MOD 30 MIN: CPT | Mod: PBBFAC

## 2023-09-05 NOTE — PROGRESS NOTES
[facility] GENERAL SURGERY   Clinic Note          HPI:   Lore Stallings is a 63 y.o. female with PMHx of GERD, asthma, HTN, and ovarian cancer s/p ex lap with radical resection, rectal mass (high-grade dysplasia) for which she declined surgical intervention, and an appendectomy. She presents to clinic today with RUQ and follow up after a diagnosis of choledocholithiasis in September of last year which was treated with ERCP, sphincterotomy, and stenting. She was found to have a very contracted gallbladder at this time. The stent was removed in November of 2022. Patient was also found to have a hiatal hernia for which she does not want surgical intervention for at this time. Further has a rectal mass diagnosed on colonscopy but is refusing to discuss the matter and actively becomes upset and yells when the topic is broached. Multiple physicians have attempted to talk with her about this. At this point she would like to focus on her gallbladder. At her most recent general surgery clinic visit on August 15, patient was interested in cholecystectomy, but RUQ abdominal ultrasound did not visualize a gallbladder. Since her last visit, patient has gotten another RUQ abdominal ultrasound in an attempt to visualize the gallbladder for evaluation which was also did not visualize the gallbladder. Patient describes RUQ pain as constant cramping, not worse with meals and and occassionally associated with nausea. She has had persistent diarrhea since having her ERCP stent removed last year. No blood in her stool. Denies any fevers, chills, yellowing of skin. atient denies hematemesis, chest pain, shortness or breath, reflux symptoms.        Physical Exam:   Vitals:    09/05/23 1205   BP: 124/82   Pulse: 107   Resp: 18   Temp: 98.4 °F (36.9 °C)      Gen: NAD, AAOx3   Eye: KORI, EOMI   CVS: Normal RRR   Chest: Non-labored breathing on room air   Abd: Midline laparotomy scar, well-healed with no erythema. Mild right mid abdomen  tenderness. Storey sign negative.  Ext:  Move all 4 extremities.          Interval Imaging:    US Abdomen  Limited Gallbladder 8/15/2023  Impression:     1. Mild hepatic steatosis without hepatomegaly.  2. The gallbladder is not visualized.  A HIDA scan could be performed to further evaluate this patient if clinically indicated.          Assessment/Plan:   Lore Stallings is a 63 y.o. female with PMHx of GERD, asthma, HTN, and ovarian cancer s/p ex lap with radical resection, previous appendectomy, and rectal mass who presents to discuss surgical options after ERCP with sphincterotomy and stenting which was removed in November of 2022. At that time surgery was consulted and found her to have a chronically very contracted gallbladder with a stone within this contracted gallbladder, it was determined at that time that she did not require a cholecystectomy as it was very unlikely that her gallbladder was the source of her choledocholithiasis. She is reporting continue diarrhea since the stent was removed.     - Will have HIDA scan done to further visualize gallbladder  - Return to clinic in 1 month to discuss options  - Recommend follow up for rectal mass       Gillian Balderas  [facility], MS3  09/05/2023 1:10 PM       I have reviewed the note above; changes made where necessary, I concur with the documentation.     Lucille Amaro MD  [facility] General Surgery PGY-1  2:34 PM

## 2023-09-05 NOTE — PROGRESS NOTES
Pt seen by Dr. Amaro; HIDA scan ordered & pt given centralized scheduling information sheet; Pt instructed to return to clinic in 1 month for results; Discharge paperwork given w/pt verbalizing understanding

## 2023-09-11 ENCOUNTER — HOSPITAL ENCOUNTER (OUTPATIENT)
Dept: RADIOLOGY | Facility: HOSPITAL | Age: 63
Discharge: HOME | End: 2023-09-11
Attending: SURGERY
Payer: MEDICAID

## 2023-09-11 DIAGNOSIS — R10.11 RUQ PAIN: ICD-10-CM

## 2023-09-11 PROCEDURE — 78226 HEPATOBILIARY SYSTEM IMAGING: CPT | Mod: TC

## 2023-09-14 ENCOUNTER — OFFICE VISIT (OUTPATIENT)
Dept: FAMILY MEDICINE | Facility: CLINIC | Age: 63
End: 2023-09-14
Payer: MEDICAID

## 2023-09-14 VITALS
OXYGEN SATURATION: 96 % | SYSTOLIC BLOOD PRESSURE: 126 MMHG | TEMPERATURE: 98 F | HEIGHT: 66 IN | BODY MASS INDEX: 20.86 KG/M2 | DIASTOLIC BLOOD PRESSURE: 75 MMHG | WEIGHT: 129.81 LBS | HEART RATE: 99 BPM

## 2023-09-14 DIAGNOSIS — J45.909 ASTHMA, UNSPECIFIED ASTHMA SEVERITY, UNSPECIFIED WHETHER COMPLICATED, UNSPECIFIED WHETHER PERSISTENT: ICD-10-CM

## 2023-09-14 DIAGNOSIS — K21.9 GASTROESOPHAGEAL REFLUX DISEASE, UNSPECIFIED WHETHER ESOPHAGITIS PRESENT: ICD-10-CM

## 2023-09-14 DIAGNOSIS — M81.0 OSTEOPOROSIS WITHOUT CURRENT PATHOLOGICAL FRACTURE, UNSPECIFIED OSTEOPOROSIS TYPE: ICD-10-CM

## 2023-09-14 PROCEDURE — 99213 OFFICE O/P EST LOW 20 MIN: CPT | Mod: PBBFAC | Performed by: STUDENT IN AN ORGANIZED HEALTH CARE EDUCATION/TRAINING PROGRAM

## 2023-09-14 RX ORDER — IPRATROPIUM BROMIDE AND ALBUTEROL SULFATE 2.5; .5 MG/3ML; MG/3ML
3 SOLUTION RESPIRATORY (INHALATION) EVERY 6 HOURS PRN
Qty: 75 ML | Refills: 3 | Status: SHIPPED | OUTPATIENT
Start: 2023-09-14 | End: 2023-12-20 | Stop reason: SDUPTHER

## 2023-09-14 RX ORDER — ALENDRONATE SODIUM 10 MG/1
10 TABLET ORAL DAILY
Qty: 90 TABLET | Refills: 0 | Status: SHIPPED | OUTPATIENT
Start: 2023-09-14 | End: 2023-12-20 | Stop reason: SDUPTHER

## 2023-09-14 RX ORDER — ALENDRONATE SODIUM 10 MG/1
10 TABLET ORAL WEEKLY
Qty: 12 TABLET | Refills: 0 | Status: SHIPPED | OUTPATIENT
Start: 2023-09-14 | End: 2023-09-14 | Stop reason: SDUPTHER

## 2023-09-14 RX ORDER — OMEPRAZOLE 20 MG/1
40 CAPSULE, DELAYED RELEASE ORAL DAILY
Qty: 60 CAPSULE | Refills: 5 | Status: SHIPPED | OUTPATIENT
Start: 2023-09-14 | End: 2023-12-19

## 2023-09-14 RX ORDER — CYCLOBENZAPRINE HCL 10 MG
10 TABLET ORAL 3 TIMES DAILY PRN
Qty: 90 TABLET | Refills: 1 | Status: SHIPPED | OUTPATIENT
Start: 2023-09-14 | End: 2023-12-20 | Stop reason: SDUPTHER

## 2023-09-14 RX ORDER — ALENDRONATE SODIUM 10 MG/1
10 TABLET ORAL DAILY
Qty: 90 TABLET | Refills: 0 | Status: SHIPPED | OUTPATIENT
Start: 2023-09-14 | End: 2023-09-14

## 2023-09-14 NOTE — PROGRESS NOTES
Chief Complaint  Medication Refill      History of Present Illness  Lore Stallings is a 63 y.o. year old female presents to the clinic for medication refill. Patient has no acute complaints at this visit. Has been having problems with her gallbladder and daily diarrhea, pending surgery consultation for possible cholecystectomy. No weight loss.      Chronic conditions:  Osteoporosis- Fosamax 10 mg daily (initiated 2021 per note 6/2023), no recent falls   Thyroid nodules- US thyroid 9/2021- sub centimeter nodules, slight heterogenicity and increased vascularity, TSH low, T4 nml 8/2021  GERD- on Protonix 40 mg daily  COPD- follows Dr. Gonzalez (Pulmonology)  Chronic low back pain- MRI lumbar spine 2/2022- lumbar degenerative disc disease and spondylosis, on cyclobenzaprine 10 mg TID prn       Healthcare Maintenance  Breast cancer screening: ordered 3/2023- not completed, informed refusal for repeat order today 2/2 shoulder pain 11/2023  Colon cancer screening: colonoscopy 11/2021- 20 mm polyp transverse colon, two 3 mm sigmoid polyps, two 3 mm rectal polyps, 5 cm in length frond-like/villous and sessile non obstructing large mass/polyp in proximal rectum (noted to be high grade dysplasia in previous notes), recommend referral to colo-rectal surgeon and repeat colonoscopy in 1 year, prep poor; informed refusal further work up as noted above   Cervical cancer screening: PAP 11/2019 NL HPV neg. S/p DARINEL BSO 1/2020   Lung cancer screening: quit smoking 7/2022, LDCT 6/2023 Lung-RADS 2  Osteoporosis screening: ordered 3/2023, not completed  Immunizations: unable to assess LINKs   ASCVD risk: 6.3%        Review of Systems   Constitutional:  Negative for fever.   Respiratory:  Negative for shortness of breath.    Cardiovascular:  Negative for chest pain, palpitations and leg swelling.   Gastrointestinal:  Positive for diarrhea. Negative for abdominal pain, blood in stool, constipation, melena, nausea and vomiting.    Neurological:  Negative for headaches.         Physical Exam  Vitals and nursing note reviewed.   Constitutional:       General: She is not in acute distress.  Eyes:      Conjunctiva/sclera: Conjunctivae normal.   Cardiovascular:      Rate and Rhythm: Normal rate and regular rhythm.      Heart sounds: Normal heart sounds.   Pulmonary:      Effort: Pulmonary effort is normal.      Breath sounds: Normal breath sounds.   Musculoskeletal:      Right lower leg: No edema.      Left lower leg: No edema.   Neurological:      General: No focal deficit present.   Psychiatric:         Mood and Affect: Mood normal.         Vitals:    09/14/23 1341   BP: 126/75   Pulse: 99   Temp: 98.1 °F (36.7 °C)     Wt Readings from Last 2 Encounters:   09/14/23 58.9 kg (129 lb 12.8 oz)   09/05/23 58.5 kg (129 lb)         Current Outpatient Medications  Current Outpatient Medications   Medication Instructions    albuterol (PROVENTIL/VENTOLIN HFA) 90 mcg/actuation inhaler 2 puffs, Inhalation, Every 4 hours PRN, Rescue    albuterol-ipratropium (DUO-NEB) 2.5 mg-0.5 mg/3 mL nebulizer solution 3 mLs, Nebulization, Every 6 hours PRN, Rescue    alendronate (FOSAMAX) 10 mg, Oral, Weekly    cyclobenzaprine (FLEXERIL) 10 mg, Oral, 3 times daily PRN    mometasone-formoterol (DULERA) 100-5 mcg/actuation HFAA 200 mcg, Inhalation, 2 times daily PRN, Controller    nebulizers Misc 1 each, Misc.(Non-Drug; Combo Route), Daily    omeprazole (PRILOSEC) 40 mg, Oral, Daily             Assessment / Plan:    1. Osteoporosis without current pathological fracture, unspecified osteoporosis type  - alendronate (FOSAMAX) 10 MG Tab; Take 1 tablet (10 mg total) by mouth once daily.  Dispense: 90 tablet; Refill: 0    2. Gastroesophageal reflux disease, unspecified whether esophagitis present  - omeprazole (PRILOSEC) 20 MG capsule; Take 2 capsules (40 mg total) by mouth once daily.  Dispense: 60 capsule; Refill: 5    3. Asthma, unspecified asthma severity, unspecified  whether complicated, unspecified whether persistent  - albuterol-ipratropium (DUO-NEB) 2.5 mg-0.5 mg/3 mL nebulizer solution; Take 3 mLs by nebulization every 6 (six) hours as needed for Wheezing or Shortness of Breath. Rescue  Dispense: 75 mL; Refill: 3        Follow up:    In 3 months, or earlier if needed.     Tiara Champion M.D.  [facility] PGY-3

## 2023-09-25 NOTE — PROGRESS NOTES
Faculty addendum: Patient discussed with resident. Chart was reviewed including vitals, labs, etc. Care provided reasonable and necessary. I participated in the management of the patient and was immediately available throughout the encounter. Services were furnished in a primary care center located in the outpatient department of a [facility] hospital. I agree with the resident's findings and plan as documented in the resident's note.

## 2023-10-03 ENCOUNTER — OFFICE VISIT (OUTPATIENT)
Dept: SURGERY | Facility: CLINIC | Age: 63
End: 2023-10-03
Payer: MEDICAID

## 2023-10-03 ENCOUNTER — TELEPHONE (OUTPATIENT)
Dept: GASTROENTEROLOGY | Facility: CLINIC | Age: 63
End: 2023-10-03
Payer: MEDICAID

## 2023-10-03 VITALS
TEMPERATURE: 99 F | HEART RATE: 87 BPM | BODY MASS INDEX: 20.22 KG/M2 | HEIGHT: 66 IN | DIASTOLIC BLOOD PRESSURE: 80 MMHG | RESPIRATION RATE: 18 BRPM | WEIGHT: 125.81 LBS | OXYGEN SATURATION: 97 % | SYSTOLIC BLOOD PRESSURE: 172 MMHG

## 2023-10-03 DIAGNOSIS — R19.7 DIARRHEA, UNSPECIFIED TYPE: Primary | ICD-10-CM

## 2023-10-03 PROCEDURE — 99214 OFFICE O/P EST MOD 30 MIN: CPT | Mod: PBBFAC

## 2023-10-03 NOTE — TELEPHONE ENCOUNTER
"I returned PT's phone call to discuss surgery referral.  I informed PT once again that Dr. Bishop does not remove gallbladders and that is something surgery will have to take care of.  Also informed PT that we need to go through with a colonoscopy before any surgery to address the rectal mass.  She stated "I am not doing a colonoscopy and will find someone else to remove my gallbladder"  she hung up the phone before I could say anything else.   "

## 2023-10-03 NOTE — TELEPHONE ENCOUNTER
----- Message from Funmi Venegas sent at 10/3/2023  1:56 PM CDT -----  Pt called bc symptoms of gallbladder giving her trouble and wants to know if Dr CASTAÑEDA can get in touch with Dr Bishop to remove it . Please advise  [phone]
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
## 2023-10-03 NOTE — PROGRESS NOTES
Seen by Dr. Syed after discussion of next steps patient walked out no f/u apt need at this time

## 2023-10-03 NOTE — TELEPHONE ENCOUNTER
Looks like patient seen in surgery clinic today and in the past.  Extensive discussions with her regarding appropriate path of moving forward with addressing gallbladder, rectal mass/polyp, etc. but patient not open to discussion regarding this.  Dr. Bishop addressed common bile duct stones.  No need for further follow-up with him.

## 2023-11-20 ENCOUNTER — OFFICE VISIT (OUTPATIENT)
Dept: GYNECOLOGY | Facility: CLINIC | Age: 63
End: 2023-11-20
Payer: MEDICAID

## 2023-11-20 VITALS
BODY MASS INDEX: 20.79 KG/M2 | OXYGEN SATURATION: 100 % | SYSTOLIC BLOOD PRESSURE: 162 MMHG | HEIGHT: 66 IN | DIASTOLIC BLOOD PRESSURE: 78 MMHG | WEIGHT: 129.38 LBS | HEART RATE: 115 BPM | RESPIRATION RATE: 20 BRPM | TEMPERATURE: 99 F

## 2023-11-20 DIAGNOSIS — Z01.419 ENCOUNTER FOR ANNUAL ROUTINE GYNECOLOGICAL EXAMINATION: Primary | ICD-10-CM

## 2023-11-20 PROCEDURE — 1159F MED LIST DOCD IN RCRD: CPT | Mod: CPTII,,, | Performed by: NURSE PRACTITIONER

## 2023-11-20 PROCEDURE — 1101F PT FALLS ASSESS-DOCD LE1/YR: CPT | Mod: CPTII,,, | Performed by: NURSE PRACTITIONER

## 2023-11-20 PROCEDURE — 3288F FALL RISK ASSESSMENT DOCD: CPT | Mod: CPTII,,, | Performed by: NURSE PRACTITIONER

## 2023-11-20 PROCEDURE — 3077F PR MOST RECENT SYSTOLIC BLOOD PRESSURE >= 140 MM HG: ICD-10-PCS | Mod: CPTII,,, | Performed by: NURSE PRACTITIONER

## 2023-11-20 PROCEDURE — 99397 PR PREVENTIVE VISIT,EST,65 & OVER: ICD-10-PCS | Mod: S$PBB,,, | Performed by: NURSE PRACTITIONER

## 2023-11-20 PROCEDURE — 99213 OFFICE O/P EST LOW 20 MIN: CPT | Mod: PBBFAC | Performed by: NURSE PRACTITIONER

## 2023-11-20 PROCEDURE — 1159F PR MEDICATION LIST DOCUMENTED IN MEDICAL RECORD: ICD-10-PCS | Mod: CPTII,,, | Performed by: NURSE PRACTITIONER

## 2023-11-20 PROCEDURE — 3288F PR FALLS RISK ASSESSMENT DOCUMENTED: ICD-10-PCS | Mod: CPTII,,, | Performed by: NURSE PRACTITIONER

## 2023-11-20 PROCEDURE — 3008F BODY MASS INDEX DOCD: CPT | Mod: CPTII,,, | Performed by: NURSE PRACTITIONER

## 2023-11-20 PROCEDURE — 3077F SYST BP >= 140 MM HG: CPT | Mod: CPTII,,, | Performed by: NURSE PRACTITIONER

## 2023-11-20 PROCEDURE — 3078F DIAST BP <80 MM HG: CPT | Mod: CPTII,,, | Performed by: NURSE PRACTITIONER

## 2023-11-20 PROCEDURE — 1101F PR PT FALLS ASSESS DOC 0-1 FALLS W/OUT INJ PAST YR: ICD-10-PCS | Mod: CPTII,,, | Performed by: NURSE PRACTITIONER

## 2023-11-20 PROCEDURE — 3044F PR MOST RECENT HEMOGLOBIN A1C LEVEL <7.0%: ICD-10-PCS | Mod: CPTII,,, | Performed by: NURSE PRACTITIONER

## 2023-11-20 PROCEDURE — 3008F PR BODY MASS INDEX (BMI) DOCUMENTED: ICD-10-PCS | Mod: CPTII,,, | Performed by: NURSE PRACTITIONER

## 2023-11-20 PROCEDURE — 3044F HG A1C LEVEL LT 7.0%: CPT | Mod: CPTII,,, | Performed by: NURSE PRACTITIONER

## 2023-11-20 PROCEDURE — 3078F PR MOST RECENT DIASTOLIC BLOOD PRESSURE < 80 MM HG: ICD-10-PCS | Mod: CPTII,,, | Performed by: NURSE PRACTITIONER

## 2023-11-20 PROCEDURE — 99397 PER PM REEVAL EST PAT 65+ YR: CPT | Mod: S$PBB,,, | Performed by: NURSE PRACTITIONER

## 2023-11-20 NOTE — PROGRESS NOTES
"  Subjective:       Patient ID: Lore Stallings is a 63 y.o. female.    Chief Complaint:  Gynecologic Exam    History of Present Illness  The patient is  here for annual exam. 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 MG-4/3/21 & BIRADS 1. Denies breast or urinary complaints. Denies pelvic pain, abnormal bleeding or discharge. Pt reports no STIs in the past and no concerns. Quit tobacco use. Denies fly hx of breast, ovarian, uterine or colon cancer. Treated with alendronate for osteoporosis-DEXA in . Colonoscopy in 2021.    GYN & OB History  No LMP recorded (lmp unknown). Patient has had a hysterectomy.       Review of patient's allergies indicates:   Allergen Reactions    Amoxicillin-pot clavulanate Hives     Other reaction(s): Swelling    Iodine and iodide containing products      Other reaction(s): Burning sensation    Sulfamethoxazole-trimethoprim      Other reaction(s): Constipation, Dizziness, Loss of appetite, Rapid heart beat, Shaking     Past Medical History:   Diagnosis Date    Asthma     Breast disorder     GERD (gastroesophageal reflux disease)     Hyperlipidemia     Hypertension     Lumbago     Personal history of colonic polyps 2021    Sciatica      OB History    Para Term  AB Living   1 1           SAB IAB Ectopic Multiple Live Births                  # Outcome Date GA Lbr John/2nd Weight Sex Delivery Anes PTL Lv   1 Para                 Review of Systems  Review of Systems    Negative except for pertinent findings for positives per HPI     Objective:    Physical Exam    BP (!) 162/78 (BP Location: Left arm, Patient Position: Sitting, BP Method: Medium (Automatic))   Pulse (!) 115   Temp 98.5 °F (36.9 °C) (Oral)   Resp 20   Ht 5' 6" (1.676 m)   Wt 58.7 kg (129 lb 6.4 oz)   LMP  (LMP Unknown)   SpO2 100%   BMI 20.89 kg/m²   GENERAL: Well-developed female in no acute distress.  SKIN: " Normal to inspection,warm, dry and intact.  BREASTS: No rashes or erythema. No masses, lumps, discharge, tenderness.  ABDOMEN: Soft, non tender.  VULVA: General appearance WNL; external genitalia with 0.5 cm wart to Lt upper labia with pinpoint blackhead to lower Lt labia.  BLADDER: No tenderness.  BIMANUAL EXAM: Deferred per pt request.  PSYCHIATRIC: Patient is oriented to person, place, and time. Mood and affect are normal.    Assessment:       1. Encounter for annual routine gynecological examination       Plan:   Lore was seen today for gynecologic exam.    Diagnoses and all orders for this visit:    Encounter for annual routine gynecological examination    Pelvic today, pap deferred d/t hysterectomy  Schedule MG that is ordered  Follow up in about 1 year (around 11/20/2024) for annual exam.

## 2023-12-19 ENCOUNTER — OFFICE VISIT (OUTPATIENT)
Dept: FAMILY MEDICINE | Facility: CLINIC | Age: 63
End: 2023-12-19
Payer: MEDICAID

## 2023-12-19 VITALS
WEIGHT: 126.19 LBS | HEART RATE: 98 BPM | BODY MASS INDEX: 20.28 KG/M2 | TEMPERATURE: 98 F | RESPIRATION RATE: 18 BRPM | SYSTOLIC BLOOD PRESSURE: 131 MMHG | OXYGEN SATURATION: 97 % | HEIGHT: 66 IN | DIASTOLIC BLOOD PRESSURE: 75 MMHG

## 2023-12-19 DIAGNOSIS — M51.36 LUMBAR DEGENERATIVE DISC DISEASE: Primary | ICD-10-CM

## 2023-12-19 DIAGNOSIS — E55.9 VITAMIN D DEFICIENCY: ICD-10-CM

## 2023-12-19 DIAGNOSIS — M81.0 OSTEOPOROSIS WITHOUT CURRENT PATHOLOGICAL FRACTURE, UNSPECIFIED OSTEOPOROSIS TYPE: ICD-10-CM

## 2023-12-19 DIAGNOSIS — E05.90 SUBCLINICAL HYPERTHYROIDISM: ICD-10-CM

## 2023-12-19 PROCEDURE — 99215 OFFICE O/P EST HI 40 MIN: CPT | Mod: PBBFAC | Performed by: STUDENT IN AN ORGANIZED HEALTH CARE EDUCATION/TRAINING PROGRAM

## 2023-12-19 NOTE — PROGRESS NOTES
[facility] OFFICE VISIT NOTE  Lore Stallings  [id number]  12/19/2023      Chief Complaint: Hip Pain and Medication Refill (Patient states she is having lower back pain)      HPI    63 y.o. female     Right hip pain/low back pain, acute flare of chronic condition  - radiates down right leg   - no change in character, denies unexplained weight loss, bowel/bladder incontinence, saddle anesthesia  - goes to Chiropractor weekly- helps for a few days  - takes Flexeril 10 mg TID prn- helps, ibuprofen and Tylenol doesn't help  - interested in PT    - onset 2010, no known inciting factor or trauma   - MRI 2022: multi level degenerative disc disease and spondylosis     Diarrhea and RUQ pain significantly improved since last visit. Tried homeopathic remedies.     Requests refills on Fosamax, DuoNebs and Flexeril.     Chronic conditions:  Osteoporosis- Fosamax 10 mg daily (initiated 2021 per note 6/2023), DEXA 7/2023 osteoporosis with increased bone mineral density, no recent falls   Thyroid nodules, subclinical hypothyroidism- US thyroid 7/2023- sub centimeter nodules, slight heterogenicity and increased vascularity- stable since 2021, TSH low and T4 nml 7/2023  GERD, hiatal hernia- on Protonix 40 mg daily, evaluated by [facility] and surgeon in [place], declined surgical management   COPD- follows Dr. Gonzalez (Pulmonology), on albuterol prn and Symbicort   Chronic low back pain- as above, MRI lumbar spine 2/2022- lumbar degenerative disc disease and spondylosis, on cyclobenzaprine 10 mg TID prn  Rectal mass, high grade dysplasia- informed refusal further work up, discussed at multiple visits by multiple doctors within multiple specialties   Choledocholithiasis s/p ERCP, sphincterectomy and stenting (stent removed 11/2022)  Vit  D deficiency- on vit D supplementation        Healthcare Maintenance  Breast cancer screening: ordered 3/2023- not completed, informed refusal for repeat order 12/2023  Colon cancer screening:  colonoscopy 11/2021- 20 mm polyp transverse colon, two 3 mm sigmoid polyps, two 3 mm rectal polyps, 5 cm in length frond-like/villous and sessile non obstructing large mass/polyp in proximal rectum (noted to be high grade dysplasia in previous notes), recommend referral to colo-rectal surgeon and repeat colonoscopy in 1 year, prep poor; informed refusal further work up as noted above   Cervical cancer screening: PAP 11/2019 NL HPV neg. S/p DARINEL BSO 1/2020   Lung cancer screening: quit smoking 7/2022, LDCT 6/2023 Lung-RADS 2  Osteoporosis screening h/o of osteoporosis, DEXA as above   ASCVD risk: 6.3%        ROS:  CONSTITUTIONAL: No unexplained weight loss or weakness    CARDIOVASCULAR: No chest pain    RESPIRATORY:Shortness of breath improved with albuterol stable   GASTROINTESTINAL: No abdominal pain, constipation or diarrhea (resolved)  GENITOURINARY: No dysuria or hematuria    PE:  Vitals:    12/19/23 1330   BP: 131/75   Pulse: 98   Resp: 18   Temp: 98.1 °F (36.7 °C)     General: appears chronically ill, in no acute distress   Respiratory: non labored, lungs clear to auscultation bilaterally  Cardiovascular: regular rate and rhythm without murmurs   Gastrointestinal: soft, non-tender, non-distended, bowel sounds present   Genitourinary: no suprapubic tenderness   Extremities: no edema in bilateral lower extremities  Musculoskeletal: tenderness to palpation over midline spine, right paraspinal region and right gluteal region, right straight leg positive,  5/5 strength with hip flexion b/l, sensation intact, FADIR positive, STARR negative, no trochanteric tenderness, gait wnl      Assessment:   1. Lumbar degenerative disc disease    2. Subclinical hyperthyroidism    3. Osteoporosis without current pathological fracture, unspecified osteoporosis type        Plan:  - patient would like to continue current pain regimen: Flexeril prn and Chiropractor   - PT, referral to neurosurgery   - informed refusal for the  treatment of sub clincial hypothyroidism in the setting of osteoporosis and age   - discussed importance of evaluation and work up of rectal mass; informed refusal, understands risks of no treatment and risks not treating sooner rather than later    - continue Vit D supplementation   - refills Fosamax, Flexeril and DuoNebs    Return to clinic in 2 months for follow up low back pain, or sooner if needed.     Mandie Slater M.D. -III  [facility]

## 2023-12-20 DIAGNOSIS — J45.909 ASTHMA, UNSPECIFIED ASTHMA SEVERITY, UNSPECIFIED WHETHER COMPLICATED, UNSPECIFIED WHETHER PERSISTENT: ICD-10-CM

## 2023-12-20 DIAGNOSIS — M81.0 OSTEOPOROSIS WITHOUT CURRENT PATHOLOGICAL FRACTURE, UNSPECIFIED OSTEOPOROSIS TYPE: ICD-10-CM

## 2023-12-20 RX ORDER — CYCLOBENZAPRINE HCL 10 MG
10 TABLET ORAL 3 TIMES DAILY PRN
Qty: 90 TABLET | Refills: 1 | Status: SHIPPED | OUTPATIENT
Start: 2023-12-20 | End: 2024-03-21 | Stop reason: SDUPTHER

## 2023-12-20 RX ORDER — ALENDRONATE SODIUM 10 MG/1
10 TABLET ORAL DAILY
Qty: 90 TABLET | Refills: 0 | Status: SHIPPED | OUTPATIENT
Start: 2023-12-20 | End: 2024-03-21 | Stop reason: SDUPTHER

## 2023-12-20 RX ORDER — IPRATROPIUM BROMIDE AND ALBUTEROL SULFATE 2.5; .5 MG/3ML; MG/3ML
3 SOLUTION RESPIRATORY (INHALATION) EVERY 6 HOURS PRN
Qty: 75 ML | Refills: 3 | Status: SHIPPED | OUTPATIENT
Start: 2023-12-20 | End: 2024-01-19

## 2023-12-21 NOTE — PROGRESS NOTES
Faculty Attestation: Lore Stallings  was seen in Family Medicine Clinic. Patient seen and evaluated at the time of the visit. History of Present Illness, Physical Exam, and Assessment and Plan reviewed. Treatment plan is reasonable and appropriate. Compliance with treatment recommendations is important.         Erika Albright MD  Sports Medicine

## 2024-03-21 ENCOUNTER — OFFICE VISIT (OUTPATIENT)
Dept: FAMILY MEDICINE | Facility: CLINIC | Age: 64
End: 2024-03-21

## 2024-03-21 VITALS
WEIGHT: 115 LBS | RESPIRATION RATE: 20 BRPM | HEART RATE: 87 BPM | BODY MASS INDEX: 18.48 KG/M2 | TEMPERATURE: 98 F | OXYGEN SATURATION: 98 % | SYSTOLIC BLOOD PRESSURE: 148 MMHG | HEIGHT: 66 IN | DIASTOLIC BLOOD PRESSURE: 71 MMHG

## 2024-03-21 DIAGNOSIS — M81.0 OSTEOPOROSIS WITHOUT CURRENT PATHOLOGICAL FRACTURE, UNSPECIFIED OSTEOPOROSIS TYPE: ICD-10-CM

## 2024-03-21 DIAGNOSIS — B35.4 TINEA CORPORIS: Primary | ICD-10-CM

## 2024-03-21 DIAGNOSIS — K44.9 HIATAL HERNIA: ICD-10-CM

## 2024-03-21 DIAGNOSIS — R03.0 ELEVATED BLOOD PRESSURE READING: ICD-10-CM

## 2024-03-21 DIAGNOSIS — M51.36 LUMBAR DEGENERATIVE DISC DISEASE: ICD-10-CM

## 2024-03-21 DIAGNOSIS — K80.44 CHOLEDOCHOLITHIASIS WITH CHRONIC CHOLECYSTITIS: ICD-10-CM

## 2024-03-21 DIAGNOSIS — M54.16 LUMBAR RADICULOPATHY, CHRONIC: ICD-10-CM

## 2024-03-21 PROCEDURE — 99214 OFFICE O/P EST MOD 30 MIN: CPT | Mod: PBBFAC | Performed by: STUDENT IN AN ORGANIZED HEALTH CARE EDUCATION/TRAINING PROGRAM

## 2024-03-21 RX ORDER — KETOCONAZOLE 20 MG/G
CREAM TOPICAL DAILY
Qty: 60 G | Refills: 0 | Status: SHIPPED | OUTPATIENT
Start: 2024-03-21 | End: 2024-04-18

## 2024-03-21 RX ORDER — ALENDRONATE SODIUM 10 MG/1
10 TABLET ORAL DAILY
Qty: 90 TABLET | Refills: 1 | Status: SHIPPED | OUTPATIENT
Start: 2024-03-21 | End: 2024-04-18 | Stop reason: SDUPTHER

## 2024-03-21 RX ORDER — CYCLOBENZAPRINE HCL 10 MG
10 TABLET ORAL 3 TIMES DAILY PRN
Qty: 90 TABLET | Refills: 1 | Status: SHIPPED | OUTPATIENT
Start: 2024-03-21 | End: 2024-04-18 | Stop reason: SDUPTHER

## 2024-03-21 RX ORDER — HYDROXYZINE HYDROCHLORIDE 25 MG/1
25 TABLET, FILM COATED ORAL 2 TIMES DAILY PRN
Qty: 20 TABLET | Refills: 0 | Status: SHIPPED | OUTPATIENT
Start: 2024-03-21 | End: 2024-03-31

## 2024-04-18 ENCOUNTER — OFFICE VISIT (OUTPATIENT)
Dept: FAMILY MEDICINE | Facility: CLINIC | Age: 64
End: 2024-04-18
Payer: COMMERCIAL

## 2024-04-18 VITALS
HEIGHT: 66 IN | WEIGHT: 111 LBS | DIASTOLIC BLOOD PRESSURE: 70 MMHG | SYSTOLIC BLOOD PRESSURE: 138 MMHG | TEMPERATURE: 98 F | BODY MASS INDEX: 17.84 KG/M2 | OXYGEN SATURATION: 97 % | HEART RATE: 92 BPM | RESPIRATION RATE: 18 BRPM

## 2024-04-18 DIAGNOSIS — R03.0 ELEVATED BLOOD PRESSURE READING: ICD-10-CM

## 2024-04-18 DIAGNOSIS — M54.12 CERVICAL RADICULOPATHY: Primary | ICD-10-CM

## 2024-04-18 DIAGNOSIS — M81.0 OSTEOPOROSIS WITHOUT CURRENT PATHOLOGICAL FRACTURE, UNSPECIFIED OSTEOPOROSIS TYPE: ICD-10-CM

## 2024-04-18 DIAGNOSIS — E05.90 SUBCLINICAL HYPERTHYROIDISM: ICD-10-CM

## 2024-04-18 LAB
T4 FREE SERPL-MCNC: 0.82 NG/DL (ref 0.7–1.48)
TSH SERPL-ACNC: 0.66 UIU/ML (ref 0.35–4.94)

## 2024-04-18 PROCEDURE — 99214 OFFICE O/P EST MOD 30 MIN: CPT | Mod: PBBFAC | Performed by: STUDENT IN AN ORGANIZED HEALTH CARE EDUCATION/TRAINING PROGRAM

## 2024-04-18 PROCEDURE — 84443 ASSAY THYROID STIM HORMONE: CPT | Performed by: STUDENT IN AN ORGANIZED HEALTH CARE EDUCATION/TRAINING PROGRAM

## 2024-04-18 PROCEDURE — 84439 ASSAY OF FREE THYROXINE: CPT | Performed by: STUDENT IN AN ORGANIZED HEALTH CARE EDUCATION/TRAINING PROGRAM

## 2024-04-18 PROCEDURE — 36415 COLL VENOUS BLD VENIPUNCTURE: CPT | Performed by: STUDENT IN AN ORGANIZED HEALTH CARE EDUCATION/TRAINING PROGRAM

## 2024-04-18 RX ORDER — CYCLOBENZAPRINE HCL 10 MG
10 TABLET ORAL 3 TIMES DAILY PRN
Qty: 90 TABLET | Refills: 1 | Status: SHIPPED | OUTPATIENT
Start: 2024-04-18 | End: 2024-05-24 | Stop reason: SDUPTHER

## 2024-04-18 RX ORDER — ALENDRONATE SODIUM 10 MG/1
10 TABLET ORAL DAILY
Qty: 90 TABLET | Refills: 1 | Status: SHIPPED | OUTPATIENT
Start: 2024-04-18

## 2024-04-18 NOTE — PROGRESS NOTES
[facility] OFFICE VISIT NOTE  Lore Stallings  [id number]  04/18/2024      Chief Complaint: Neck Pain and Shoulder Pain (X 1 month)      HPI    63 y.o. female    Intermittent sweating and diarrhea after drinking liquids. Concerned that it's her thyroid. Previously declines treatment of subclinical hypothyroidism in setting of osteoporosis. 15 lb weight loss. Denies palpitations. Discussed possibility that chronic diarrhea is due to her gallbladder disease, but patient declined referral to outside general surgery.     Right sided neck pain that radiates into shoulder. Described as sharp and shocking pain. Onset approximately 1 month ago. Worse with neck flexion. Does not radiate into shoulder. Does not occur with exertion. Denies weakness and numbness of right upper extremity. No trauma. Taking flexeril with some relief. Sees chiropractor which is helping.     Lesion to left ankle improved, but still present. No longer pruritic. Used ketoconazole prn rather than BID scheduled.     Declines discussion, evaluation or work up of previously reported low back and right hip pain. Declines MRI ordered last visit. Unable to continue PT due to cost.     Informed refusal discussion, evaluation or referral for management of known rectal mass with high grade dysplasia.       Chronic conditions:  Osteoporosis- Fosamax 10 mg daily- prefers daily dose not weekly (initiated 2021 per note 6/2023), DEXA 7/2023 osteoporosis with increased bone mineral density  Thyroid nodules, subclinical hyperthyroidism- US thyroid 7/2023- sub centimeter nodules, slight heterogenicity and increased vascularity- stable since 2021, TSH low and T4 nml 7/2023, previously declined tx 12/2023  GERD, hiatal hernia- Protonix 40 mg daily  COPD- follows Dr. Gonzalez (Pulmonology), on albuterol prn, unable to obtain maintenace inhaler (previously on Symbicort) due to cost    Chronic low back pain  Rectal mass, high grade dysplasia- informed refusal further work up  (4/2024), discussed at multiple visits by multiple doctors within multiple specialties, patient reports understanding of risks of not undergoing further work up and treatment and risk of delayed treatment     Choledocholithiasis s/p ERCP, sphincterectomy and stenting (stent removed 11/2022)  Vit  D deficiency- on vit D supplementation     Healthcare Maintenance  Breast cancer screening: ordered 3/2023- not completed due to pain, informed refusal for repeat order 4/2024  Colon cancer screening: colonoscopy 11/2021- 20 mm polyp transverse colon, two 3 mm sigmoid polyps, two 3 mm rectal polyps, 5 cm in length frond-like/villous and sessile non obstructing large mass/polyp in proximal rectum (noted to be high grade dysplasia in previous notes), recommend referral to colo-rectal surgeon and repeat colonoscopy in 1 year, prep poor; informed refusal further work up as noted above   Cervical cancer screening: PAP 11/2019 NL HPV neg. S/p DARINEL BSO 1/2020   Lung cancer screening: quit smoking 7/2022, LDCT 6/2023 Lung-RADS 2  Osteoporosis screening h/o of osteoporosis, DEXA as above   ASCVD risk: 6.3%    Menopause    ROS:  As per HPI     PE:  Vitals:    04/18/24 1050   BP: 138/70   Pulse:    Resp:    Temp:      General: appears chronically ill, in no acute distress   Eye: no scleral icterus   Neck: no lymphadenopathy, no thyromegaly   Respiratory: end expiratory wheezing diffusely, nonlabored respirations   Cardiovascular: regular rate and rhythm without murmurs   Extremities: no edema in bilateral lower extremities  Musculoskeletal: tenderness to palpation and muscle spasms to right cervical paraspinal muscles and trapezius muscle, flexion of cervical spine 35 degrees-painful, extension of cervical spine 50 degree- minimal pain, right and left lateal rotation of neck 90 degree, sensation intact to upper extremities,  strength equal, declines Spurling test due to anticipated pain down into shoulder, left shoulder without  erythema, swelling or tenderness, left shoulder ROM: extension 30 degrees, flexion 120 degrees, internal rotation to touch thoracic spine, external rotation to 90 degrees, empty can negative, Neer's negative, Hawking negative     Assessment:   1. Cervical radiculopathy    2. Subclinical hyperthyroidism    3. Osteoporosis without current pathological fracture, unspecified osteoporosis type    4. Elevated blood pressure reading        Plan:  - Activity as tolerated, OTC NSAIDS, Tylenol, Voltaren gel and Aspercreme with lidocaine prn, declines PT at this tie   - x ray cervical spine, refill Flexeril 10 mg TID prn  - TSH and T4  - refill Fosamax 10 mg daily, continue Vit D/calcium    - informed refusal management of HTN    Return to clinic in 4 weeks for follow up subclinical hyperthyroidism, or sooner if needed.     Mandie Slaetr M.D. -III  [facility] Family Medicine

## 2024-04-20 NOTE — PROGRESS NOTES
Date of encounter 04-18-24.  Immediately available for entirety of encounter.  Multiple co-morbidities.  Rectal lesion; no further evaluation per patient's request.    Resident's note reviewed 04-20-24.  Agree with assessment; plan of care appropriate.  Professional services provided in an outpatient primary care center affiliated with a teaching institution.

## 2024-04-22 ENCOUNTER — HOSPITAL ENCOUNTER (OUTPATIENT)
Dept: RADIOLOGY | Facility: HOSPITAL | Age: 64
Discharge: HOME | End: 2024-04-22
Attending: STUDENT IN AN ORGANIZED HEALTH CARE EDUCATION/TRAINING PROGRAM
Payer: COMMERCIAL

## 2024-04-22 DIAGNOSIS — M54.12 CERVICAL RADICULOPATHY: ICD-10-CM

## 2024-04-25 ENCOUNTER — TELEPHONE (OUTPATIENT)
Dept: HEPATOLOGY | Facility: HOSPITAL | Age: 64
End: 2024-04-25
Payer: COMMERCIAL

## 2024-04-25 NOTE — TELEPHONE ENCOUNTER
----- Message from Natalie Gallo sent at 12/21/2023  8:01 AM CST -----  [facility] called and said they received a referral for this pt and they do not except medicaid for this. Rachel Bloom [phone] is who called.  Thank you

## 2024-05-01 ENCOUNTER — DOCUMENTATION ONLY (OUTPATIENT)
Dept: HEPATOLOGY | Facility: HOSPITAL | Age: 64
End: 2024-05-01
Payer: COMMERCIAL

## 2024-05-24 ENCOUNTER — OFFICE VISIT (OUTPATIENT)
Dept: FAMILY MEDICINE | Facility: CLINIC | Age: 64
End: 2024-05-24
Payer: COMMERCIAL

## 2024-05-24 VITALS
HEART RATE: 96 BPM | BODY MASS INDEX: 18.33 KG/M2 | HEIGHT: 65 IN | SYSTOLIC BLOOD PRESSURE: 137 MMHG | DIASTOLIC BLOOD PRESSURE: 76 MMHG | OXYGEN SATURATION: 98 % | TEMPERATURE: 98 F | WEIGHT: 110 LBS

## 2024-05-24 DIAGNOSIS — Z09 HOSPITAL DISCHARGE FOLLOW-UP: Primary | ICD-10-CM

## 2024-05-24 PROCEDURE — 99215 OFFICE O/P EST HI 40 MIN: CPT | Mod: PBBFAC | Performed by: STUDENT IN AN ORGANIZED HEALTH CARE EDUCATION/TRAINING PROGRAM

## 2024-05-24 RX ORDER — ALBUTEROL SULFATE 90 UG/1
2 AEROSOL, METERED RESPIRATORY (INHALATION) EVERY 6 HOURS PRN
Qty: 18 G | Refills: 5 | Status: SHIPPED | OUTPATIENT
Start: 2024-05-24 | End: 2024-05-28 | Stop reason: CLARIF

## 2024-05-24 RX ORDER — CYCLOBENZAPRINE HCL 10 MG
10 TABLET ORAL 3 TIMES DAILY PRN
Qty: 90 TABLET | Refills: 0 | Status: SHIPPED | OUTPATIENT
Start: 2024-05-24

## 2024-05-24 NOTE — PROGRESS NOTES
[facility] OFFICE VISIT NOTE  Lore Stallings  [id number]  05/24/2024      Chief Complaint: Follow-up, Abdominal Pain (Pt states she has an infection in abdomen x since 04/30/24. Pain level 10/10. ), and Medication Refill    HPI    64 y.o. female     Interval history:    Hospitalized 4/30/24 due to worsening right upper abdominal pain and diarrhea  - found to have leukocytosis work up notable for small contracted gallbladder w/ pneumobillia- chronic   - treated with antibiotics and symptomatic management  - evaluated by GI (Dr. Dempsey) and declined colonoscopy per notes   - no change to medication regimen  - reports continued RUQ abdominal pain and diarrhea, chronic over last year, declines referral to surgery   - did not call GI to schedule follow up as instructed   - denies fevers, chills, nausea, vomiting, difficulty urinating, constipation or diarrhea     Requests refill on albuterol. Issue obtaining from pharmacy due to insurance coverage. Follows [facility].     Requets refill on Flexeril. Takes due to chronic low back pain. Declined MRI 4/18/24.     Didn't obtain imaging ordered last visit. Recommend patient obtaining.     ROS:  As per HPI       PE:  Vitals:    05/24/24 1518   BP: 137/76   Pulse: 96   Temp: 98 °F (36.7 °C)     General: appears chronically ill, in no acute distress   Eye: no scleral icterus   HENT: MMM, oropharynx without erythema/exudate   Neck: supple, no lymphadenopathy  Respiratory: clear to auscultation bilaterally, nonlabored respirations   Cardiovascular: regular rate and rhythm without murmurs   Gastrointestinal: soft, mild generalized tenderness (reported to be chronic), non-distended, bowel sounds present   Genitourinary: no suprapubic tenderness   Extremities: no edema in bilateral lower extremities    Assessment:   1. Hospital discharge follow-up        Plan:  - patient appears to be at baseline since hospitalization; continues to have chronic RUQ pain associated with  diarrhea   - ensure adequate oral hydration  - instructed patient to call Dr. Dempsey to schedule follow up   - encouraged patient to call general surgery for re-evaluation; informed refusal   - informed refusal discussion regarding rectal mass  - medication reconciliation reviewed; no new or changes medications  - informed refusal vaccines, referral for endoscopy and MMG  - refills albuterol and flexeril     Return to clinic in 2 months for follow up chronic conditions, or sooner if needed.     Mandie Slater M.D. HO-III  [facility] Family Medicine

## 2024-06-17 ENCOUNTER — TELEPHONE (OUTPATIENT)
Dept: FAMILY MEDICINE | Facility: CLINIC | Age: 64
End: 2024-06-17
Payer: COMMERCIAL

## 2024-06-17 NOTE — TELEPHONE ENCOUNTER
Form completed and placed in fax bin in resident lounge. Please notify patient of completion. Thanks.

## 2024-06-17 NOTE — TELEPHONE ENCOUNTER
Form from [facility] to allow social securityto evaluate disability claim, you saw patient in May, you do not see her again, her next appointment is in August.

## 2024-06-19 ENCOUNTER — TELEPHONE (OUTPATIENT)
Dept: PULMONOLOGY | Facility: HOSPITAL | Age: 64
End: 2024-06-19
Payer: COMMERCIAL

## 2024-06-19 NOTE — TELEPHONE ENCOUNTER
CT scan from our lady laurel Booth dated 06/13/2024 is reviewed.  On series 3, image [id number] in the right upper lobe is a opacity which is associated with a cavity are bleb.  Radiologist has read this as a Lung-RADS 2 S.    Previous CT chest are reviewed and 06/15/2022 and 06/27/2023.  On the 2022 film series 3, image 22 is a opacity with cavity in that area at the level of the inocenica in the right upper lobe.  On 06/27/2023 series 3, image 40 a similar finding is in place although it appears more prominent from 20/2 I believe it to be stable from 20/3.  We will get old films for radiology to review and will repeat CT chest in 6 months short-term follow-up

## 2024-07-15 DIAGNOSIS — M81.0 OSTEOPOROSIS WITHOUT CURRENT PATHOLOGICAL FRACTURE, UNSPECIFIED OSTEOPOROSIS TYPE: ICD-10-CM

## 2024-07-15 RX ORDER — ALENDRONATE SODIUM 10 MG/1
10 TABLET ORAL DAILY
Qty: 90 TABLET | Refills: 1 | Status: SHIPPED | OUTPATIENT
Start: 2024-07-15

## 2024-08-02 ENCOUNTER — OFFICE VISIT (OUTPATIENT)
Dept: FAMILY MEDICINE | Facility: CLINIC | Age: 64
End: 2024-08-02
Payer: COMMERCIAL

## 2024-08-02 VITALS
SYSTOLIC BLOOD PRESSURE: 128 MMHG | DIASTOLIC BLOOD PRESSURE: 71 MMHG | HEIGHT: 65 IN | HEART RATE: 95 BPM | WEIGHT: 110.63 LBS | OXYGEN SATURATION: 97 % | TEMPERATURE: 99 F | BODY MASS INDEX: 18.43 KG/M2

## 2024-08-02 DIAGNOSIS — M81.0 OSTEOPOROSIS WITHOUT CURRENT PATHOLOGICAL FRACTURE, UNSPECIFIED OSTEOPOROSIS TYPE: Primary | ICD-10-CM

## 2024-08-02 DIAGNOSIS — M54.16 LUMBAR RADICULOPATHY, CHRONIC: ICD-10-CM

## 2024-08-02 DIAGNOSIS — Z92.89 HISTORY OF MAMMOGRAPHY, SCREENING: ICD-10-CM

## 2024-08-02 PROCEDURE — 99214 OFFICE O/P EST MOD 30 MIN: CPT | Mod: PBBFAC

## 2024-08-02 RX ORDER — CYCLOBENZAPRINE HCL 10 MG
10 TABLET ORAL 3 TIMES DAILY PRN
Qty: 90 TABLET | Refills: 0 | Status: SHIPPED | OUTPATIENT
Start: 2024-08-02

## 2024-08-02 RX ORDER — ALENDRONATE SODIUM 10 MG/1
10 TABLET ORAL DAILY
Qty: 90 TABLET | Refills: 1 | Status: SHIPPED | OUTPATIENT
Start: 2024-08-02

## 2024-08-02 NOTE — PROGRESS NOTES
[facility] FM Clinic Progress Note    ID:  Lore Stallings   MRN:  [id number]     8/2/2024    Chief Complaint:    Chief Complaint   Patient presents with    Follow-up    Medication Refill     Rx Flexeril and Fosamax     History of Present Illness:  Lore Stallings is a 64 y.o. female who presents to [facility] FM clinic for:     Conditions addressed this visit:  Osteoporosis- Fosamax 10 mg daily- prefers daily dose not weekly (initiated 2021 per note 6/2023), DEXA 7/2023 osteoporosis with increased bone mineral density. Requests refill today.   Chronic low back pain: improved with Flexeril. Requests refill.     Conditions not addressed this visit:  GERD, hiatal hernia- Protonix 40 mg daily  Thyroid nodules, subclinical hyperthyroidism- US thyroid 7/2023- sub centimeter nodules not meeting criteria for surveillance/biopsy- stable since 2021. Previously declined tx in 12/2023. TSH/T4 wnl in 04/2024.  COPD/asthma - follows Dr. Gonzalez (Pulmonology), on albuterol prn, breo.  Rectal mass, high grade dysplasia- informed refusal further work up (4/2024), discussed at multiple visits by multiple doctors within multiple specialties, patient reports understanding of risks of not undergoing further work up and treatment and risk of delayed treatment.   Choledocholithiasis s/p ERCP, sphincterectomy and stenting (stent removed 11/2022). Treated with abx for pneumobilia in 04/2024.  Vit  D deficiency- on vit D supplementation      Healthcare Maintenance  - Breast cancer screening: ordered 3/2023- not completed due to pain, informed refusal for repeat order 4/2024  - Colon cancer screening: colonoscopy 11/2021- 20 mm polyp transverse colon, two 3 mm sigmoid polyps, two 3 mm rectal polyps, 5 cm in length frond-like/villous and sessile non obstructing large mass/polyp in proximal rectum (noted to be high grade dysplasia in previous notes), recommend referral to colo-rectal surgeon and repeat colonoscopy in 1 year, prep poor; informed refusal  further work up as noted above. Positive cologuard in 2023.   - Cervical cancer screening: PAP 2019 NL HPV neg. S/p [facility] 2020   - Lung cancer screening: quit smoking 2022, LDCT 2024 Lung RADS category 2 with follow up chest CT recommend in 2024  - Osteoporosis screening h/o of osteoporosis, DEXA as above   - ASCVD risk: 6.3%    - Fertility status: [facility] 2020   - informed refusal of PCV, shingles, tetanus vaccine on 2024    Medical History  Review of patient's allergies indicates:   Allergen Reactions    Amoxicillin-pot clavulanate Hives     Other reaction(s): Swelling    Iodine and iodide containing products      Other reaction(s): Burning sensation    Sulfamethoxazole-trimethoprim      Other reaction(s): Constipation, Dizziness, Loss of appetite, Rapid heart beat, Shaking     Past Medical History:   Diagnosis Date    Asthma     Breast disorder     GERD (gastroesophageal reflux disease)     Hyperlipidemia     Hypertension     Lumbago     Personal history of colonic polyps 2021    Sciatica      Social History     Tobacco Use    Smoking status: Former     Current packs/day: 0.00     Average packs/day: 1 pack/day for 53.0 years (53.0 ttl pk-yrs)     Types: Cigarettes     Start date: 1969     Quit date: 2022     Years since quittin.0     Passive exposure: Never    Smokeless tobacco: Never   Substance Use Topics    Alcohol use: Yes    Drug use: Never     Past Surgical History:   Procedure Laterality Date    APPENDECTOMY       SECTION      COLONOSCOPY      COLONOSCOPY W/ POLYPECTOMY  2021    ERCP W/ SPHICTEROTOMY N/A 2022    Procedure: ERCP, WITH SPHINCTEROTOMY;  Surgeon: Iris Sandy MD;  Location: [facility] ENDOSCOPY;  Service: Gastroenterology;  Laterality: N/A;    ESOPHAGOGASTRECTOMY      gallstone removed      HYSTERECTOMY      POLYPECTOMY      RHINOPLASTY      TONSILLECTOMY       family history includes Arthritis in her brother; Asthma in her  father; Heart disease in her brother.       Medication List with Changes/Refills   Current Medications    ALBUTEROL (PROVENTIL/VENTOLIN HFA) 90 MCG/ACTUATION INHALER    Inhale 2 puffs into the lungs every 4 (four) hours as needed for Wheezing. Rescue    ALBUTEROL-IPRATROPIUM (DUO-NEB) 2.5 MG-0.5 MG/3 ML NEBULIZER SOLUTION    Take 3 mLs by nebulization every 6 (six) hours as needed for Wheezing or Shortness of Breath. Rescue    FLUTICASONE FUROATE-VILANTEROL (BREO ELLIPTA) 200-25 MCG/DOSE DSDV DISKUS INHALER    Inhale 1 puff into the lungs once daily. Controller    KETOCONAZOLE (NIZORAL) 2 % CREAM    Apply topically once daily. for 14 days    NEBULIZERS MISC    1 each by Misc.(Non-Drug; Combo Route) route once daily.   Changed and/or Refilled Medications    Modified Medication Previous Medication    ALENDRONATE (FOSAMAX) 10 MG TAB alendronate (FOSAMAX) 10 MG Tab       Take 1 tablet (10 mg total) by mouth once daily.    Take 1 tablet (10 mg total) by mouth once daily.    CYCLOBENZAPRINE (FLEXERIL) 10 MG TABLET cyclobenzaprine (FLEXERIL) 10 MG tablet       Take 1 tablet (10 mg total) by mouth 3 (three) times daily as needed for Muscle spasms.    Take 1 tablet (10 mg total) by mouth 3 (three) times daily as needed for Muscle spasms.       Review of Systems:  ROS reviewed with patient and updated below.  Review of Systems   Constitutional:  Negative for fever.   HENT:  Negative for congestion.    Respiratory:  Negative for shortness of breath.    Cardiovascular:  Negative for chest pain.   Gastrointestinal:  Negative for abdominal pain.   Genitourinary:  Negative for dysuria.   Skin:  Negative for color change.   Neurological:  Negative for dizziness.     Pertinent positives and negatives as mentioned in HPI    Objective:    Vitals:    08/02/24 1441   BP: 128/71   Pulse: 95   Temp: 98.7 °F (37.1 °C)       Physical Exam  Vitals reviewed.   Eyes:      Extraocular Movements: Extraocular movements intact.   Cardiovascular:       Rate and Rhythm: Normal rate and regular rhythm.      Heart sounds: No murmur heard.  Pulmonary:      Effort: Pulmonary effort is normal.      Breath sounds: No stridor. No wheezing.   Skin:     General: Skin is warm and dry.      Coloration: Skin is not pale.   Neurological:      Mental Status: She is alert and oriented to person, place, and time.   Psychiatric:         Behavior: Behavior normal.          Assessment/Plan:  Lore was seen today for follow-up and medication refill.    Diagnoses and all orders for this visit:    Osteoporosis without current pathological fracture, unspecified osteoporosis type  -     stable, continue alendronate (FOSAMAX) 10 MG Tab; Take 1 tablet (10 mg total) by mouth once daily.    Lumbar radiculopathy, chronic  -     stable, continue cyclobenzaprine (FLEXERIL) 10 MG tablet; Take 1 tablet (10 mg total) by mouth 3 (three) times daily as needed for Muscle spasms.    History of mammography, screening  -     Agrees to Mammo Digital Screening Bilat w/ Loco; Future          Follow up in about 2 months (around 10/2/2024) for routine follow up.    Future Appointments   Date Time Provider Department Center   10/3/2024  2:40 PM Hellen Andersen MD [facility]   10/28/2024  1:00 PM [facility]   11/25/2024  1:30 PM Tatiana Herron, ANP [facility] [place]     Hellen Andersen MD  [facility], [facility]

## 2024-09-05 DIAGNOSIS — M54.16 LUMBAR RADICULOPATHY, CHRONIC: ICD-10-CM

## 2024-09-10 RX ORDER — CYCLOBENZAPRINE HCL 10 MG
10 TABLET ORAL 3 TIMES DAILY PRN
Qty: 90 TABLET | Refills: 0 | Status: SHIPPED | OUTPATIENT
Start: 2024-09-10

## 2024-10-03 ENCOUNTER — OFFICE VISIT (OUTPATIENT)
Dept: FAMILY MEDICINE | Facility: CLINIC | Age: 64
End: 2024-10-03
Payer: COMMERCIAL

## 2024-10-03 VITALS
WEIGHT: 108.63 LBS | SYSTOLIC BLOOD PRESSURE: 155 MMHG | OXYGEN SATURATION: 96 % | HEIGHT: 65 IN | BODY MASS INDEX: 18.1 KG/M2 | HEART RATE: 92 BPM | TEMPERATURE: 98 F | DIASTOLIC BLOOD PRESSURE: 69 MMHG

## 2024-10-03 DIAGNOSIS — M54.16 LUMBAR RADICULOPATHY, CHRONIC: ICD-10-CM

## 2024-10-03 DIAGNOSIS — Z12.31 ENCOUNTER FOR SCREENING MAMMOGRAM FOR BREAST CANCER: ICD-10-CM

## 2024-10-03 DIAGNOSIS — M81.0 OSTEOPOROSIS WITHOUT CURRENT PATHOLOGICAL FRACTURE, UNSPECIFIED OSTEOPOROSIS TYPE: Primary | ICD-10-CM

## 2024-10-03 DIAGNOSIS — Z28.20 VACCINE REFUSED BY PATIENT: ICD-10-CM

## 2024-10-03 PROCEDURE — 99215 OFFICE O/P EST HI 40 MIN: CPT | Mod: PBBFAC

## 2024-10-03 RX ORDER — ALENDRONATE SODIUM 10 MG/1
10 TABLET ORAL DAILY
Qty: 90 TABLET | Refills: 1 | Status: SHIPPED | OUTPATIENT
Start: 2024-10-03 | End: 2024-10-03 | Stop reason: SDUPTHER

## 2024-10-03 RX ORDER — CYCLOBENZAPRINE HCL 10 MG
10 TABLET ORAL 3 TIMES DAILY PRN
Qty: 90 TABLET | Refills: 1 | Status: SHIPPED | OUTPATIENT
Start: 2024-10-03

## 2024-10-03 RX ORDER — ALENDRONATE SODIUM 10 MG/1
10 TABLET ORAL DAILY
Qty: 90 TABLET | Refills: 1 | Status: SHIPPED | OUTPATIENT
Start: 2024-10-03

## 2024-10-03 RX ORDER — CYCLOBENZAPRINE HCL 10 MG
10 TABLET ORAL 3 TIMES DAILY PRN
Qty: 90 TABLET | Refills: 1 | Status: SHIPPED | OUTPATIENT
Start: 2024-10-03 | End: 2024-10-03 | Stop reason: SDUPTHER

## 2024-10-03 NOTE — PROGRESS NOTES
[facility] FM Clinic Progress Note    ID:  Lore Stallings   MRN:  [id number]     10/3/2024    Chief Complaint:    Chief Complaint   Patient presents with    Medication Refill     History of Present Illness:  Lore Stallings is a 64 y.o. female who presents to [facility] FM clinic for:     Conditions addressed this visit:  Osteoporosis- Fosamax 10 mg daily- prefers daily dose not weekly (initiated 2021 per note 6/2023), DEXA 7/2023 osteoporosis with increased bone mineral density. Requests refill today.   Chronic low back pain: improved with Flexeril. Requests refill.      Problem list:  Thyroid nodules, subclinical hyperthyroidism- US thyroid 7/2023- sub centimeter nodules not meeting criteria for surveillance/biopsy- stable since 2021. Previously declined tx in 12/2023. TSH/T4 wnl in 04/2024.  COPD/asthma - follows [facility] Pulmonology, on albuterol, breo.  Rectal mass, high grade dysplasia- informed refusal further work up (4/2024), patient reports understanding of risks of not undergoing further work up and treatment and risk of delayed treatment.   Choledocholithiasis s/p ERCP, sphincterectomy and stenting (stent removed 11/2022)     Healthcare Maintenance  - MMG: agrees to mammogram. Had previously refused.   - Colon cancer screening: colonoscopy 11/2021- 20 mm polyp transverse colon, two 3 mm sigmoid polyps, two 3 mm rectal polyps, 5 cm in length frond-like/villous and sessile non obstructing large mass/polyp in proximal rectum (noted to be high grade dysplasia in previous notes), recommend referral to colo-rectal surgeon and repeat colonoscopy in 1 year, prep poor; informed refusal further work up as noted above. Positive cologuard in 03/2023. (10/3/2024)  - Cervical cancer screening: PAP 11/2019 NL HPV neg. S/p DARINEL BSO 1/2020   - Lung cancer screening: quit smoking 7/2022, LDCT 6/2024 Lung RADS category 2 with follow up chest CT recommend in 12/2024  - Osteoporosis screening h/o of osteoporosis, DEXA as above  - Fertility  status: [facility] 1/2020   - Vaccines: informed refusal of PCV, shingles, tetanus vaccine (10/3/2024)      Medication List with Changes/Refills   Current Medications    ALBUTEROL (PROVENTIL/VENTOLIN HFA) 90 MCG/ACTUATION INHALER    Inhale 2 puffs into the lungs every 4 (four) hours as needed for Wheezing. Rescue    ALBUTEROL-IPRATROPIUM (DUO-NEB) 2.5 MG-0.5 MG/3 ML NEBULIZER SOLUTION    Take 3 mLs by nebulization every 6 (six) hours as needed for Wheezing or Shortness of Breath. Rescue    FLUTICASONE FUROATE-VILANTEROL (BREO ELLIPTA) 200-25 MCG/DOSE DSDV DISKUS INHALER    Inhale 1 puff into the lungs once daily. Controller    KETOCONAZOLE (NIZORAL) 2 % CREAM    Apply topically once daily. for 14 days    NEBULIZERS MISC    1 each by Misc.(Non-Drug; Combo Route) route once daily.   Changed and/or Refilled Medications    Refilled Medication     ALENDRONATE (FOSAMAX) 10 MG TAB Take 1 tablet (10 mg total) by mouth once daily.    CYCLOBENZAPRINE (FLEXERIL) 10 MG TABLET Take 1 tablet (10 mg total) by mouth 3 (three) times daily as needed for Muscle spasms.       Review of Systems:  Pertinent positives and negatives as mentioned in HPI    Objective:    Vitals:    10/03/24 1453   BP: (!) 155/69   Pulse: 92   Temp: 98.3 °F (36.8 °C)       Physical Exam  Vitals reviewed.   Constitutional:       Comments: Thin appearing    HENT:      Mouth/Throat:      Mouth: Mucous membranes are moist.   Eyes:      Extraocular Movements: Extraocular movements intact.   Cardiovascular:      Rate and Rhythm: Normal rate and regular rhythm.      Heart sounds: No murmur heard.  Pulmonary:      Effort: Pulmonary effort is normal.      Breath sounds: No stridor. No wheezing.      Comments: Diffuse coarse breath sounds  Skin:     General: Skin is warm and dry.      Coloration: Skin is not pale.   Neurological:      Mental Status: She is alert and oriented to person, place, and time.   Psychiatric:         Behavior: Behavior normal.          Assessment/Plan:  Lore was seen today for medication refill.    Diagnoses and all orders for this visit:    Osteoporosis without current pathological fracture  -     continue alendronate (FOSAMAX) 10 MG Tab; Take 1 tablet (10 mg total) by mouth once daily until 2025  -      repeat DXA in 2025     Lumbar radiculopathy, chronic  -     stable, controlled with flexeril   -     cyclobenzaprine (FLEXERIL) 10 MG tablet; Take 1 tablet (10 mg total) by mouth 3 (three) times daily as needed for Muscle spasms.    Informed refusal by patient  -     patient expressed verbal understanding of risks associated with refusing undergoing further work up for colon cancer including but not limited to cancer, potential treatment, death in setting of high risk indicated by positive cologuard, abnormal prior colonoscopy.   -     She verbalized understanding of risks including but not limited to pain, discomfort, and/or death by refusing tetanus vaccine, shingles, pneumococcal vaccine particularly in setting of COPD.       Follow up in about 3 months (around 1/3/2025) for routine follow up.    Future Appointments   Date Time Provider Department Center   10/28/2024  1:00 PM Rafy Wood MD [facility] [place]   11/25/2024  1:30 PM Tatiana Herron, ANP [facility] GYN [place]   1/3/2025  3:40 PM Hellen Andersen MD [facility]     Hellen Andersen MD  [facility], -III

## 2024-11-27 DIAGNOSIS — Z12.31 ENCOUNTER FOR SCREENING MAMMOGRAM FOR BREAST CANCER: Primary | ICD-10-CM

## 2024-12-11 NOTE — PROGRESS NOTES
Family Medicine Clinic Note     Subjective     Patient ID: Lore Stallings is a 64 y.o. female.    Chief Complaint: Abdominal pain    Abdominal pain  - Located epigastric and RUQ, radiates into right chest, up into right shoulder, and up right side of neck  - Described as sharp and stabbing, worse with fatty foods. Tylenol alleviates somewhat  - has history of choledocholithiasis s/p ERCP, sphincterectomy and stenting (stent removed 2022)   - Has had persistent diarrhea (15x per day) since surgery. Has been unable to eat much, has lost weight, clothes are no longer fitting. 30 lb weight loss in the last year per chart review  - Has intermittent flushing of face that lasts a few seconds and then spontaneously resolves  - CT abdomen pelvis with IV contrast 24: gallbladder appears small and contracted without abnormality identified. There is diffuse pneumobilia centrally and along the common bile duct and extending into the left hepatic lobe, consistent with history of sphincterotomy. Moderate size hiatal hernia.    Problem List  Asthma/COPD  GERD  HLD  HTN  Low back pain, sciatica  Hx of colon polyps  Osteoporosis - Stopped taking fosamax about a week ago. Thought it may be contributing to diarrhea  Thyroid nodules, subclinical hyperthyroidism, TSH WNL 24  Rectal mass - informed refusal of further workup (2024)    Family History   Problem Relation Name Age of Onset    Asthma Father      Heart disease Brother      Arthritis Brother          Social History     Tobacco Use    Smoking status: Former     Current packs/day: 0.00     Average packs/day: 1 pack/day for 53.0 years (53.0 ttl pk-yrs)     Types: Cigarettes     Start date: 1969     Quit date: 2022     Years since quittin.4     Passive exposure: Never    Smokeless tobacco: Never   Substance Use Topics    Alcohol use: Yes        Past Surgical History:   Procedure Laterality Date    APPENDECTOMY       SECTION      COLONOSCOPY       COLONOSCOPY W/ POLYPECTOMY  11/03/2021    ERCP W/ SPHICTEROTOMY N/A 09/13/2022    Procedure: ERCP, WITH SPHINCTEROTOMY;  Surgeon: Iris Sandy MD;  Location: [facility] ENDOSCOPY;  Service: Gastroenterology;  Laterality: N/A;    ESOPHAGOGASTRECTOMY      gallstone removed      HYSTERECTOMY      POLYPECTOMY      RHINOPLASTY      TONSILLECTOMY          Review of Systems   Constitutional:  Positive for weight loss. Negative for chills and fever.   Respiratory:  Negative for shortness of breath.    Cardiovascular:  Negative for chest pain.   Gastrointestinal:  Positive for abdominal pain, diarrhea and nausea.   Genitourinary:  Negative for dysuria and frequency.   Musculoskeletal:  Positive for back pain.   Neurological:  Negative for dizziness and headaches.        Objective     Vitals:    12/19/24 1501   BP: 133/79   Pulse: 90   Temp: 98.1 °F (36.7 °C)        Current Outpatient Medications   Medication Instructions    albuterol (PROVENTIL/VENTOLIN HFA) 90 mcg/actuation inhaler 2 puffs, Inhalation, Every 4 hours PRN, Rescue    albuterol-ipratropium (DUO-NEB) 2.5 mg-0.5 mg/3 mL nebulizer solution 3 mLs, Nebulization, Every 6 hours PRN, Rescue    alendronate (FOSAMAX) 10 mg, Oral, Daily    cyclobenzaprine (FLEXERIL) 10 mg, Oral, 3 times daily PRN    fluticasone-salmeterol diskus inhaler 100-50 mcg 1 puff, Inhalation, 2 times daily, Controller    nebulizers Misc 1 each, Misc.(Non-Drug; Combo Route), Daily        Physical Exam  Gen: AAO, NAD, ill appearing, thin  CV: regular rate and rhythm  Resp: non-labored, clear to auscultation bilaterally, no wheezing  Abd: soft, ND, +BS, epigastric and RUQ tenderness, no masses appreciated  Skin: no jaundice    Assessment and Plan      Choledocholithiasis with chronic cholecystitis  Hiatal hernia  Epigastric pain  RUQ pain  Chronic diarrhea  - Due to chronic RUQ and epigastric pain, worsened with fatty foods, chronic diarrhea, and weight loss, referral placed to general surgery for  cholecystectomy evaluation  - Labs ordered: CBC, CMP    Subclinical hyperthyroidism  - TSH; Future; Expected date: 2024      Follow up in about 3 months (around 3/19/2025) for RUQ pain.    Health Maintenance    CRC screenin2021- 20 mm polyp transverse colon, two 3 mm sigmoid polyps, two 3 mm rectal polyps, 5 cm in length frond-like/villous and sessile non obstructing large mass/polyp in proximal rectum (noted to be high grade dysplasia in previous notes), recommend referral to colo-rectal surgeon and repeat colonoscopy in 1 year, prep poor; informed refusal further work up. Positive cologuard in 2023.   LDCT:  quit smoking 2022. LDCT 2024 Lung RADS category 2 with follow up chest CT recommend in 2024. Repeat CT chest 10/15/24: 3 mm right upper lobe pulmonary nodule stable   Cervical Cancer screening: PAP 2019 NL HPV neg. S/p [facility] 2020    Mammogram: ordered 10/3/24, not scheduled yet  DEXA: 2023 - T score -2.6 femurs, -1.8 lumbar spine. BMD increased from prior, on daily Fosamax  T2DM Screenin.2 (2023)  Lipid Screen: 2023  HBV screening: NR   HCV screening: NR   Immunizations:  informed refusal of PCV, shingles, tetanus vaccine (10/3/2024)       Elaina Lombardo MD  [facility]

## 2024-12-19 ENCOUNTER — OFFICE VISIT (OUTPATIENT)
Dept: FAMILY MEDICINE | Facility: CLINIC | Age: 64
End: 2024-12-19
Payer: COMMERCIAL

## 2024-12-19 VITALS
HEART RATE: 90 BPM | HEIGHT: 65 IN | BODY MASS INDEX: 17.46 KG/M2 | WEIGHT: 104.81 LBS | OXYGEN SATURATION: 97 % | SYSTOLIC BLOOD PRESSURE: 133 MMHG | TEMPERATURE: 98 F | DIASTOLIC BLOOD PRESSURE: 79 MMHG

## 2024-12-19 DIAGNOSIS — K44.9 HIATAL HERNIA: ICD-10-CM

## 2024-12-19 DIAGNOSIS — K80.44 CHOLEDOCHOLITHIASIS WITH CHRONIC CHOLECYSTITIS: Primary | ICD-10-CM

## 2024-12-19 DIAGNOSIS — R10.13 EPIGASTRIC PAIN: ICD-10-CM

## 2024-12-19 DIAGNOSIS — R10.11 RUQ PAIN: ICD-10-CM

## 2024-12-19 DIAGNOSIS — E05.90 SUBCLINICAL HYPERTHYROIDISM: ICD-10-CM

## 2024-12-19 DIAGNOSIS — K52.9 CHRONIC DIARRHEA: ICD-10-CM

## 2024-12-19 PROBLEM — K83.09 CHOLANGITIS: Status: RESOLVED | Noted: 2022-09-18 | Resolved: 2024-12-19

## 2024-12-19 PROBLEM — R03.0 ELEVATED BLOOD PRESSURE READING: Status: RESOLVED | Noted: 2022-06-28 | Resolved: 2024-12-19

## 2024-12-19 PROBLEM — G89.29 OTHER CHRONIC PAIN: Status: RESOLVED | Noted: 2022-06-28 | Resolved: 2024-12-19

## 2024-12-19 PROBLEM — R65.10 SYSTEMIC INFLAMMATORY RESPONSE SYNDROME (SIRS): Status: RESOLVED | Noted: 2022-09-12 | Resolved: 2024-12-19

## 2024-12-19 PROBLEM — M25.469: Status: RESOLVED | Noted: 2022-10-18 | Resolved: 2024-12-19

## 2024-12-19 LAB
ALBUMIN SERPL-MCNC: 4 G/DL (ref 3.4–4.8)
ALBUMIN/GLOB SERPL: 1.3 RATIO (ref 1.1–2)
ALP SERPL-CCNC: 62 UNIT/L (ref 40–150)
ALT SERPL-CCNC: 17 UNIT/L (ref 0–55)
ANION GAP SERPL CALC-SCNC: 8 MEQ/L
AST SERPL-CCNC: 17 UNIT/L (ref 5–34)
BASOPHILS # BLD AUTO: 0.06 X10(3)/MCL
BASOPHILS NFR BLD AUTO: 0.5 %
BILIRUB SERPL-MCNC: 0.5 MG/DL
BUN SERPL-MCNC: 13.3 MG/DL (ref 9.8–20.1)
CALCIUM SERPL-MCNC: 9.8 MG/DL (ref 8.4–10.2)
CHLORIDE SERPL-SCNC: 111 MMOL/L (ref 98–107)
CO2 SERPL-SCNC: 27 MMOL/L (ref 23–31)
CREAT SERPL-MCNC: 0.76 MG/DL (ref 0.55–1.02)
CREAT/UREA NIT SERPL: 18
EOSINOPHIL # BLD AUTO: 0.2 X10(3)/MCL (ref 0–0.9)
EOSINOPHIL NFR BLD AUTO: 1.8 %
ERYTHROCYTE [DISTWIDTH] IN BLOOD BY AUTOMATED COUNT: 13.6 % (ref 11.5–17)
GFR SERPLBLD CREATININE-BSD FMLA CKD-EPI: >60 ML/MIN/1.73/M2
GLOBULIN SER-MCNC: 3.2 GM/DL (ref 2.4–3.5)
GLUCOSE SERPL-MCNC: 106 MG/DL (ref 82–115)
HCT VFR BLD AUTO: 44 % (ref 37–47)
HGB BLD-MCNC: 13.9 G/DL (ref 12–16)
IMM GRANULOCYTES # BLD AUTO: 0.03 X10(3)/MCL (ref 0–0.04)
IMM GRANULOCYTES NFR BLD AUTO: 0.3 %
LYMPHOCYTES # BLD AUTO: 3.36 X10(3)/MCL (ref 0.6–4.6)
LYMPHOCYTES NFR BLD AUTO: 30.5 %
MCH RBC QN AUTO: 29.8 PG (ref 27–31)
MCHC RBC AUTO-ENTMCNC: 31.6 G/DL (ref 33–36)
MCV RBC AUTO: 94.4 FL (ref 80–94)
MONOCYTES # BLD AUTO: 0.52 X10(3)/MCL (ref 0.1–1.3)
MONOCYTES NFR BLD AUTO: 4.7 %
NEUTROPHILS # BLD AUTO: 6.83 X10(3)/MCL (ref 2.1–9.2)
NEUTROPHILS NFR BLD AUTO: 62.2 %
NRBC BLD AUTO-RTO: 0 %
PLATELET # BLD AUTO: 314 X10(3)/MCL (ref 130–400)
PMV BLD AUTO: 10.5 FL (ref 7.4–10.4)
POTASSIUM SERPL-SCNC: 4.2 MMOL/L (ref 3.5–5.1)
PROT SERPL-MCNC: 7.2 GM/DL (ref 5.8–7.6)
RBC # BLD AUTO: 4.66 X10(6)/MCL (ref 4.2–5.4)
SODIUM SERPL-SCNC: 146 MMOL/L (ref 136–145)
TSH SERPL-ACNC: 0.72 UIU/ML (ref 0.35–4.94)
WBC # BLD AUTO: 11 X10(3)/MCL (ref 4.5–11.5)

## 2024-12-19 PROCEDURE — 99214 OFFICE O/P EST MOD 30 MIN: CPT | Mod: PBBFAC

## 2024-12-19 PROCEDURE — 85025 COMPLETE CBC W/AUTO DIFF WBC: CPT

## 2024-12-19 PROCEDURE — 84443 ASSAY THYROID STIM HORMONE: CPT

## 2024-12-19 PROCEDURE — 80053 COMPREHEN METABOLIC PANEL: CPT

## 2024-12-19 PROCEDURE — 36415 COLL VENOUS BLD VENIPUNCTURE: CPT

## 2024-12-19 RX ORDER — FLUTICASONE PROPIONATE AND SALMETEROL 100; 50 UG/1; UG/1
1 POWDER RESPIRATORY (INHALATION) 2 TIMES DAILY
COMMUNITY

## 2024-12-19 RX ORDER — CYCLOBENZAPRINE HCL 10 MG
10 TABLET ORAL 3 TIMES DAILY PRN
Qty: 90 TABLET | Refills: 1 | Status: SHIPPED | OUTPATIENT
Start: 2024-12-19

## 2025-03-24 NOTE — PROGRESS NOTES
Family Medicine Clinic Note     Subjective     Lore Stallings is a 64 y.o. female here for follow up of abdominal pain.     Chief Complaint: abdominal pain    HPI    Abdominal pain  - Epigastric, RUQ, into chest and right shoulder/neck. Described as sharp, stabbing. Unchanged from prior visit.  - Has hx choledocholithiasis s/p ERCP, sphincterectomy and stenting (stent removed 2022)   - Eeight stable since last visit  - Unable to tolerate water or gatorade, is able to tolerate coke  - Feferred to general surgery, waiting to hear back regarding follow up appointment to discuss plan      Problem List  Asthma/COPD  GERD  HLD  HTN  Choledocholithiasis  Low back pain, sciatica  Hx of colon polyps  Osteoporosis - Stopped taking fosamax - Thought it may be contributing to diarrhea  Thyroid nodules, subclinical hyperthyroidism, TSH WNL 24  Rectal mass - informed refusal of further workup (2024)    Family History   Problem Relation Name Age of Onset    Asthma Father      Heart disease Brother      Arthritis Brother          Social History     Tobacco Use    Smoking status: Former     Current packs/day: 0.00     Average packs/day: 1 pack/day for 53.0 years (53.0 ttl pk-yrs)     Types: Cigarettes     Start date: 1969     Quit date: 2022     Years since quittin.7     Passive exposure: Never    Smokeless tobacco: Never   Substance Use Topics    Alcohol use: Yes        Past Surgical History:   Procedure Laterality Date    APPENDECTOMY       SECTION      COLONOSCOPY      COLONOSCOPY W/ POLYPECTOMY  2021    ERCP W/ SPHICTEROTOMY N/A 2022    Procedure: ERCP, WITH SPHINCTEROTOMY;  Surgeon: Iris Sandy MD;  Location: [facility] ENDOSCOPY;  Service: Gastroenterology;  Laterality: N/A;    ESOPHAGOGASTRECTOMY      gallstone removed      HYSTERECTOMY      POLYPECTOMY      RHINOPLASTY      TONSILLECTOMY          Current Outpatient Medications   Medication Instructions    albuterol  (PROVENTIL/VENTOLIN HFA) 90 mcg/actuation inhaler 2 puffs, Inhalation, Every 4 hours PRN, Rescue    albuterol-ipratropium (DUO-NEB) 2.5 mg-0.5 mg/3 mL nebulizer solution 3 mLs, Nebulization, Every 6 hours PRN, Rescue    nebulizers Misc 1 each, Misc.(Non-Drug; Combo Route), Daily        Objective     Vitals:    25 1332   BP: 136/74   Pulse: 89   Resp: 18   Temp: 98.4 °F (36.9 °C)          Physical Exam  Gen: alert, oriented, no acute distress  CV: normal rate, regular rhythm  Resp: non-labored, clear to auscultation bilaterally, no wheezing  Abd: soft, +BS, tender to palpation on right lateral upper quadrant  Assessment and Plan      RUQ pain  - Patient requested to discontinue cyclobenzaprine as she thinks this may be contributing to her symptoms. Agree with stopping medication and monitoring for improvement in symptoms. She will call with sooner follow up if she has any worsening or worrisome symptoms.    Follow up in about 3 months or sooner if needed.     Health Maintenance    CRC screenin2021- 20 mm polyp transverse colon, two 3 mm sigmoid polyps, two 3 mm rectal polyps, 5 cm in length frond-like/villous and sessile non obstructing large mass/polyp in proximal rectum (noted to be high grade dysplasia in previous notes), recommend referral to colo-rectal surgeon and repeat colonoscopy in 1 year, prep poor; informed refusal further work up. Positive cologuard in 2023.   LDCT: quit smoking 2022. LDCT 2024 Lung RADS category 2 with follow up chest CT recommend in 2024. Repeat CT chest 10/15/24: 3 mm right upper lobe pulmonary nodule stable. Plan for repeat 2025 per pulm.   Cervical Cancer screening: PAP 2019 NL HPV neg. S/p DARINEL BSO 2020    Mammogram: ordered 2024, not scheduled yet  DEXA: 2023 - T score -2.6 femurs, -1.8 lumbar spine. BMD increased from prior  T2DM Screenin.2 (2023)  Lipid Screen: 2023  HBV screening: NR   HCV screening: NR   Immunizations:  informed  refusal of PCV, shingles, tetanus vaccine (10/3/2024)       Elaina Lombardo MD  [facility] -I

## 2025-03-28 ENCOUNTER — OFFICE VISIT (OUTPATIENT)
Dept: FAMILY MEDICINE | Facility: CLINIC | Age: 65
End: 2025-03-28
Payer: COMMERCIAL

## 2025-03-28 VITALS
SYSTOLIC BLOOD PRESSURE: 136 MMHG | WEIGHT: 103 LBS | OXYGEN SATURATION: 97 % | BODY MASS INDEX: 17.16 KG/M2 | HEART RATE: 89 BPM | HEIGHT: 65 IN | TEMPERATURE: 98 F | DIASTOLIC BLOOD PRESSURE: 74 MMHG | RESPIRATION RATE: 18 BRPM

## 2025-03-28 DIAGNOSIS — R10.11 RUQ PAIN: Primary | ICD-10-CM

## 2025-03-28 PROCEDURE — 99214 OFFICE O/P EST MOD 30 MIN: CPT | Mod: PBBFAC

## 2025-06-18 NOTE — PROGRESS NOTES
Family Medicine Clinic Note     Subjective     Patient ID: Lore Stallings is a 65 y.o. female here for follow up.     Chief Complaint: neck and low back pain    HPI    Neck pain  - injury , worsened as of 2 months ago  - right sided neck pain that radiates into shoulder. Described as muscle stiffness, worsened with movement, alleviated with ibuprofen 600 mg and OTC pain relief gel  - denies weakness, numbness, or tingling in arms, hand or fingers    Low back pain  - as above, injury in  but worsened over last two months  - located in right buttock, does not radiate down leg. Denies pain/numbness/tingling in leg, foot, or toes  - Worsened with walking, alleviated with rest  - MRI lumbar 2022 DDD and spondylosis, L4-L5 and L5-S1 moderate central canal stenosis  - previously offered PT, MRI in 2024 but declined    Saw pulm  for pulmonary nodule; enlargement of right upper lobe ground glass nodule, extremely high suspicion for adenocarcinoma given tobacco abuse hx. Patient does not wish to proceed with biopsy/lobectomy    Mammo ordered, patient declined scheduling    DEXA - osteoporosis femurs 2023, repeat due. Stopped Fosamax 2024 as she thought it was contributing to her diarrhea    Problem List  Asthma/COPD  GERD  HLD  HTN  Choledocholithiasis  Low back pain, sciatica  Hx of colon polyps  Osteoporosis - Fosamax 9022-4973  Thyroid nodules, subclinical hyperthyroidism, TSH WNL 24  Rectal mass - informed refusal of further workup (2024)    Family History   Problem Relation Name Age of Onset    Asthma Father      Heart disease Brother      Arthritis Brother          Social History     Tobacco Use    Smoking status: Former     Current packs/day: 0.00     Average packs/day: 1 pack/day for 53.0 years (53.0 ttl pk-yrs)     Types: Cigarettes     Start date: 1969     Quit date: 2022     Years since quittin.9     Passive exposure: Never    Smokeless tobacco: Never   Substance Use Topics  "   Alcohol use: Yes        Past Surgical History:   Procedure Laterality Date    APPENDECTOMY       SECTION      COLONOSCOPY      COLONOSCOPY W/ POLYPECTOMY  2021    ERCP W/ SPHICTEROTOMY N/A 2022    Procedure: ERCP, WITH SPHINCTEROTOMY;  Surgeon: Iris Sandy MD;  Location: [facility] ENDOSCOPY;  Service: Gastroenterology;  Laterality: N/A;    ESOPHAGOGASTRECTOMY      gallstone removed      HYSTERECTOMY      POLYPECTOMY      RHINOPLASTY      TONSILLECTOMY          Current Outpatient Medications   Medication Instructions    albuterol (PROVENTIL/VENTOLIN HFA) 90 mcg/actuation inhaler 2 puffs, Inhalation, Every 4 hours PRN, Rescue    albuterol-ipratropium (DUO-NEB) 2.5 mg-0.5 mg/3 mL nebulizer solution 3 mLs, Nebulization, Every 6 hours PRN, Rescue    fluticasone-salmeterol diskus inhaler 250-50 mcg 1 puff, 2 times daily    ibuprofen (ADVIL,MOTRIN) 600 mg, Oral, Every 8 hours PRN    nebulizers Misc 1 each, Misc.(Non-Drug; Combo Route), Daily        ROS   As per HPI    Objective     Vitals:    25 1450 25 1540   BP: (!) 161/63 (!) 146/76   BP Location: Left arm Left arm   Patient Position: Sitting Sitting   Pulse: 88    Temp: 98.1 °F (36.7 °C)    TempSrc: Oral    SpO2: 98%    Weight: 47.6 kg (105 lb)    Height: 5' 5" (1.651 m)         Physical Exam  Gen: alert, no acute distress  CV: normal rate, regular rhythm  Resp: non-labored  Abd: soft, NT, +BS  MSK: right cervical paraspinal muscle spasm and tenderness of right trapezius muscle. Cervical ROM limited in flexion and extension secondary to pain. Strength 5/5 in BUE. LLE strength 5/5. RLE strength 4/5 in hip flexion, 5/5 in knee flexion and extension and in right foot plantar flexion and dorsiflexion    Assessment and Plan      Cervical paraspinal muscle spasm  Degeneration of intervertebral disc of lumbar region with lower extremity pain  - Recommended NSAIDs, heat, OTC Arnicare, and stretching/activity as tolerated  - Refill of " ibuprofen 600 mg q8h PRN pain sent to pharmacy  - Offered updating imaging and trying course of physical therapy but she declined as this time as she is in the process of switching health insurance    Osteoporosis, unspecified osteoporosis type, unspecified pathological fracture presence  - Was previously on Fosamax but self-discontinued in Dec 2024. Discussed repeating DEXA and re-initiating medication but she declined. Informed refusal of medication and repeat DEXA scan    Patient will call clinic once new health insurance is obtained, or sooner if needed, to schedule follow up.     Health Maintenance    CRC screenin2021- 20 mm polyp transverse colon, two 3 mm sigmoid polyps, two 3 mm rectal polyps, 5 cm in length frond-like/villous and sessile non obstructing large mass/polyp in proximal rectum (noted to be high grade dysplasia in previous notes), recommend referral to colo-rectal surgeon and repeat colonoscopy in 1 year, prep poor; informed refusal further work up. Positive cologuard in 2023.   LDCT: quit smoking 2022. LDCT 2024 Lung RADS category 2 with follow up chest CT recommend in 2024. Repeat CT chest 10/15/24: 3 mm right upper lobe pulmonary nodule stable. 2025: continued enlargement of a part solid and part groundglass nodule in the right upper lobe, in total measuring approximately 1.9 x 3.7 x 1.6 cm   Cervical Cancer screening: PAP 2019 NL HPV neg. S/p DARINEL BSO 2020    Mammogram: ordered 2024, not performed. Informed refusal further screening   DEXA: 2023 - T score -2.6 femurs, -1.8 lumbar spine. BMD increased from prior. Due, informed refusal further testing and medication  T2DM Screenin.2 (2023)  Lipid Screen: 2023  HBV screening: NR   HCV screening: NR   Immunizations:  informed refusal of PCV, shingles, tetanus vaccine (10/3/2024)       Elaina Lombardo MD  [facility]

## 2025-06-23 ENCOUNTER — OFFICE VISIT (OUTPATIENT)
Dept: FAMILY MEDICINE | Facility: CLINIC | Age: 65
End: 2025-06-23
Payer: COMMERCIAL

## 2025-06-23 VITALS
HEART RATE: 88 BPM | WEIGHT: 105 LBS | TEMPERATURE: 98 F | DIASTOLIC BLOOD PRESSURE: 76 MMHG | BODY MASS INDEX: 17.49 KG/M2 | OXYGEN SATURATION: 98 % | SYSTOLIC BLOOD PRESSURE: 146 MMHG | HEIGHT: 65 IN

## 2025-06-23 DIAGNOSIS — M81.0 OSTEOPOROSIS, UNSPECIFIED OSTEOPOROSIS TYPE, UNSPECIFIED PATHOLOGICAL FRACTURE PRESENCE: ICD-10-CM

## 2025-06-23 DIAGNOSIS — M51.361 DEGENERATION OF INTERVERTEBRAL DISC OF LUMBAR REGION WITH LOWER EXTREMITY PAIN: ICD-10-CM

## 2025-06-23 DIAGNOSIS — M62.838 CERVICAL PARASPINAL MUSCLE SPASM: Primary | ICD-10-CM

## 2025-06-23 PROCEDURE — 99215 OFFICE O/P EST HI 40 MIN: CPT | Mod: PBBFAC

## 2025-06-23 RX ORDER — IBUPROFEN 600 MG/1
600 TABLET, FILM COATED ORAL EVERY 8 HOURS PRN
Qty: 30 TABLET | Refills: 0 | Status: SHIPPED | OUTPATIENT
Start: 2025-06-23

## 2025-06-24 NOTE — PROGRESS NOTES
I reviewed History, PE, A/P and medical record.  Services provided in outpatient department of a teaching hospital/facility, I was immediately available.  I agree with resident, care reasonable and necessary with any exceptions stated below.  I evaluated the patient with resident at time of visit, participated in key parts of H/P and management was discussed    Patient gets very upset if we bring up her cancer diagnoses but myself as well as other providers have had r/b discussion about treatment on several occasions and she admitted understanding    Siobhan Chapman MD  [facility] - [place]

## 2025-07-10 ENCOUNTER — TELEPHONE (OUTPATIENT)
Dept: FAMILY MEDICINE | Facility: CLINIC | Age: 65
End: 2025-07-10
Payer: COMMERCIAL

## 2025-07-10 NOTE — TELEPHONE ENCOUNTER
Attempted to call patient no answer left voicemail to call about mammogram needing to be completed.

## (undated) DEVICE — ELECTRODE PATIENT RETURN DISP

## (undated) DEVICE — INJECTION RAPID REFILL RX CONT

## (undated) DEVICE — EXTRACTOR PRO 9-12MM ABOVE

## (undated) DEVICE — Device